# Patient Record
Sex: FEMALE | Race: WHITE | Employment: FULL TIME | URBAN - METROPOLITAN AREA
[De-identification: names, ages, dates, MRNs, and addresses within clinical notes are randomized per-mention and may not be internally consistent; named-entity substitution may affect disease eponyms.]

---

## 2019-10-25 ENCOUNTER — HOSPITAL ENCOUNTER (EMERGENCY)
Age: 19
Discharge: HOME | End: 2019-10-25
Attending: EMERGENCY MEDICINE
Payer: COMMERCIAL

## 2019-10-25 VITALS
OXYGEN SATURATION: 100 % | WEIGHT: 219 LBS | TEMPERATURE: 97.3 F | HEART RATE: 91 BPM | RESPIRATION RATE: 12 BRPM | DIASTOLIC BLOOD PRESSURE: 91 MMHG | SYSTOLIC BLOOD PRESSURE: 126 MMHG

## 2019-10-25 DIAGNOSIS — R31.9 URINARY TRACT INFECTION WITH HEMATURIA, SITE UNSPECIFIED: Primary | ICD-10-CM

## 2019-10-25 DIAGNOSIS — N39.0 URINARY TRACT INFECTION WITH HEMATURIA, SITE UNSPECIFIED: Primary | ICD-10-CM

## 2019-10-25 LAB
BACTERIA: ABNORMAL /HPF
BILIRUBIN URINE: NEGATIVE
BLOOD, URINE: ABNORMAL
CLARITY: ABNORMAL
COLOR: YELLOW
GLUCOSE URINE: NEGATIVE MG/DL
HCG(URINE) PREGNANCY TEST: NEGATIVE
KETONES, URINE: NEGATIVE MG/DL
LEUKOCYTE ESTERASE, URINE: ABNORMAL
NITRITE, URINE: NEGATIVE
PH UA: 6 (ref 5–9)
PROTEIN UA: 100 MG/DL
RBC UA: ABNORMAL /HPF (ref 0–2)
SPECIFIC GRAVITY UA: 1.01 (ref 1–1.03)
UROBILINOGEN, URINE: 0.2 E.U./DL
WBC UA: ABNORMAL /HPF (ref 0–5)

## 2019-10-25 PROCEDURE — G0382 LEV 3 HOSP TYPE B ED VISIT: HCPCS

## 2019-10-25 PROCEDURE — 81001 URINALYSIS AUTO W/SCOPE: CPT

## 2019-10-25 PROCEDURE — 81025 URINE PREGNANCY TEST: CPT

## 2019-10-25 RX ORDER — NITROFURANTOIN 25; 75 MG/1; MG/1
100 CAPSULE ORAL 2 TIMES DAILY
Qty: 20 CAPSULE | Refills: 0 | Status: SHIPPED | OUTPATIENT
Start: 2019-10-25 | End: 2019-11-04

## 2019-10-25 ASSESSMENT — PAIN DESCRIPTION - PROGRESSION
CLINICAL_PROGRESSION: NOT CHANGED
CLINICAL_PROGRESSION: NOT CHANGED

## 2019-10-25 ASSESSMENT — PAIN SCALES - GENERAL: PAINLEVEL_OUTOF10: 7

## 2019-10-25 ASSESSMENT — PAIN DESCRIPTION - PAIN TYPE: TYPE: ACUTE PAIN

## 2019-10-25 ASSESSMENT — ENCOUNTER SYMPTOMS
GASTROINTESTINAL NEGATIVE: 1
EYES NEGATIVE: 1
ALLERGIC/IMMUNOLOGIC NEGATIVE: 1
RESPIRATORY NEGATIVE: 1

## 2019-10-25 ASSESSMENT — PAIN DESCRIPTION - ORIENTATION: ORIENTATION: LOWER

## 2019-10-25 ASSESSMENT — PAIN DESCRIPTION - LOCATION: LOCATION: ABDOMEN

## 2019-10-25 ASSESSMENT — PAIN DESCRIPTION - DESCRIPTORS: DESCRIPTORS: SORE;ACHING

## 2019-10-25 ASSESSMENT — PAIN DESCRIPTION - DIRECTION: RADIATING_TOWARDS: BACK

## 2019-10-25 ASSESSMENT — PAIN - FUNCTIONAL ASSESSMENT: PAIN_FUNCTIONAL_ASSESSMENT: PREVENTS OR INTERFERES SOME ACTIVE ACTIVITIES AND ADLS

## 2019-10-25 ASSESSMENT — PAIN DESCRIPTION - FREQUENCY: FREQUENCY: CONTINUOUS

## 2019-11-15 ENCOUNTER — HOSPITAL ENCOUNTER (EMERGENCY)
Age: 19
Discharge: HOME | End: 2019-11-15
Attending: EMERGENCY MEDICINE
Payer: COMMERCIAL

## 2019-11-15 VITALS
TEMPERATURE: 97.8 F | DIASTOLIC BLOOD PRESSURE: 78 MMHG | RESPIRATION RATE: 12 BRPM | HEART RATE: 84 BPM | WEIGHT: 215 LBS | OXYGEN SATURATION: 99 % | SYSTOLIC BLOOD PRESSURE: 112 MMHG

## 2019-11-15 DIAGNOSIS — S61.011A LACERATION OF RIGHT THUMB WITHOUT FOREIGN BODY WITHOUT DAMAGE TO NAIL, INITIAL ENCOUNTER: Primary | ICD-10-CM

## 2019-11-15 PROCEDURE — 12001 RPR S/N/AX/GEN/TRNK 2.5CM/<: CPT

## 2019-11-15 PROCEDURE — G0381 LEV 2 HOSP TYPE B ED VISIT: HCPCS

## 2019-11-15 ASSESSMENT — PAIN DESCRIPTION - ORIENTATION: ORIENTATION: RIGHT

## 2019-11-15 ASSESSMENT — PAIN DESCRIPTION - PROGRESSION
CLINICAL_PROGRESSION: NOT CHANGED
CLINICAL_PROGRESSION: RAPIDLY IMPROVING

## 2019-11-15 ASSESSMENT — ENCOUNTER SYMPTOMS
EYES NEGATIVE: 1
ALLERGIC/IMMUNOLOGIC NEGATIVE: 1
GASTROINTESTINAL NEGATIVE: 1
RESPIRATORY NEGATIVE: 1

## 2019-11-15 ASSESSMENT — PAIN DESCRIPTION - LOCATION: LOCATION: FINGER (COMMENT WHICH ONE)

## 2019-11-15 ASSESSMENT — PAIN SCALES - GENERAL: PAINLEVEL_OUTOF10: 6

## 2019-11-15 ASSESSMENT — PAIN DESCRIPTION - PAIN TYPE: TYPE: ACUTE PAIN

## 2019-11-15 ASSESSMENT — PAIN - FUNCTIONAL ASSESSMENT: PAIN_FUNCTIONAL_ASSESSMENT: PREVENTS OR INTERFERES SOME ACTIVE ACTIVITIES AND ADLS

## 2019-11-15 ASSESSMENT — PAIN DESCRIPTION - FREQUENCY: FREQUENCY: INTERMITTENT

## 2019-11-15 ASSESSMENT — PAIN DESCRIPTION - DESCRIPTORS: DESCRIPTORS: SORE

## 2020-02-17 ENCOUNTER — HOSPITAL ENCOUNTER (EMERGENCY)
Age: 20
Discharge: HOME | End: 2020-02-17
Attending: EMERGENCY MEDICINE
Payer: COMMERCIAL

## 2020-02-17 VITALS
HEART RATE: 106 BPM | OXYGEN SATURATION: 98 % | SYSTOLIC BLOOD PRESSURE: 119 MMHG | WEIGHT: 213 LBS | RESPIRATION RATE: 18 BRPM | DIASTOLIC BLOOD PRESSURE: 78 MMHG | TEMPERATURE: 98.5 F

## 2020-02-17 LAB — HCG(URINE) PREGNANCY TEST: NEGATIVE

## 2020-02-17 PROCEDURE — G0382 LEV 3 HOSP TYPE B ED VISIT: HCPCS

## 2020-02-17 PROCEDURE — 81025 URINE PREGNANCY TEST: CPT

## 2020-02-17 RX ORDER — BROMPHENIRAMINE MALEATE, PSEUDOEPHEDRINE HYDROCHLORIDE, AND DEXTROMETHORPHAN HYDROBROMIDE 2; 30; 10 MG/5ML; MG/5ML; MG/5ML
5 SYRUP ORAL 4 TIMES DAILY PRN
Qty: 120 ML | Refills: 0 | Status: SHIPPED | OUTPATIENT
Start: 2020-02-17 | End: 2020-11-01

## 2020-02-17 RX ORDER — ONDANSETRON 4 MG/1
4 TABLET, ORALLY DISINTEGRATING ORAL EVERY 8 HOURS PRN
Qty: 10 TABLET | Refills: 0 | Status: SHIPPED | OUTPATIENT
Start: 2020-02-17 | End: 2020-11-01

## 2020-02-17 ASSESSMENT — PAIN DESCRIPTION - LOCATION: LOCATION: GENERALIZED

## 2020-02-17 ASSESSMENT — ENCOUNTER SYMPTOMS
COUGH: 0
DIARRHEA: 0
SORE THROAT: 0
WHEEZING: 0
BACK PAIN: 0
SINUS PRESSURE: 0
NAUSEA: 1
EYE DISCHARGE: 0
SHORTNESS OF BREATH: 0
ABDOMINAL DISTENTION: 0
EYE REDNESS: 0
EYE PAIN: 0
RHINORRHEA: 1
VOMITING: 0

## 2020-02-17 ASSESSMENT — PAIN SCALES - GENERAL
PAINLEVEL_OUTOF10: 6
PAINLEVEL_OUTOF10: 6

## 2020-02-17 ASSESSMENT — PAIN DESCRIPTION - PAIN TYPE: TYPE: ACUTE PAIN

## 2020-02-17 ASSESSMENT — PAIN - FUNCTIONAL ASSESSMENT: PAIN_FUNCTIONAL_ASSESSMENT: 0-10

## 2020-02-17 ASSESSMENT — PAIN DESCRIPTION - DESCRIPTORS: DESCRIPTORS: TINGLING

## 2020-02-17 ASSESSMENT — ACTIVITIES OF DAILY LIVING (ADL): EFFECT OF PAIN ON DAILY ACTIVITIES: WORSE WITH STANDING

## 2020-02-17 NOTE — LETTER
[address] [place] [address] Emergency Department  [address] [address]  Phone: [phone]               February 17, 2020    Patient: Allison Strickland   YOB: 2000   Date of Visit: 2/17/2020       To Whom It May Concern:    Librado Silva was seen and treated in our emergency department on 2/17/2020. She may return to work on 2/19/2020.       Sincerely,       Josephina Claude, RN         Signature:__________________________________

## 2020-11-01 ENCOUNTER — HOSPITAL ENCOUNTER (EMERGENCY)
Age: 20
Discharge: HOME | End: 2020-11-01
Payer: COMMERCIAL

## 2020-11-01 VITALS
WEIGHT: 211 LBS | HEART RATE: 115 BPM | OXYGEN SATURATION: 97 % | DIASTOLIC BLOOD PRESSURE: 73 MMHG | TEMPERATURE: 98.4 F | RESPIRATION RATE: 16 BRPM | SYSTOLIC BLOOD PRESSURE: 133 MMHG

## 2020-11-01 PROCEDURE — 99212 OFFICE O/P EST SF 10 MIN: CPT

## 2020-11-01 RX ORDER — ONDANSETRON 4 MG/1
4 TABLET, ORALLY DISINTEGRATING ORAL EVERY 12 HOURS PRN
Qty: 6 TABLET | Refills: 0 | Status: SHIPPED | OUTPATIENT
Start: 2020-11-01 | End: 2022-07-24

## 2020-11-01 NOTE — ED PROVIDER NOTES
Department of Emergency Medicine   ED  Provider Note  Admit Date/RoomTime: 2020 12:37 PM  ED Room:   Chief Complaint   Emesis (states she is 5.5 weeks pregnant and having morning sickness, but was throwing up more last night than usual and called off work) and Letter for School/Work    History of Present Illness   Source of history provided by:  patient. History/Exam Limitations: none. Lawrence Leigh is a 23 y.o. old female who has a past medical history of:   Past Medical History:   Diagnosis Date    Depression     presents to the emergency department requesting a work excuse. Patient is  1, para 0.  5-1/2 weeks gestation, following with Dr. Bebe Fatima. Patient states she has had some mild morning sickness off and on throughout the pregnancy. However last night she states after eating she got very sick. States she had multiple episodes of vomiting and diarrhea. States she had to call off work. Today she just feels drained and tired. Also admits to a headache. Vomiting and diarrhea has resolved today. ROS    Pertinent positives and negatives are stated within HPI, all other systems reviewed and are negative. History reviewed. No pertinent surgical history. Social History:  reports that she has been smoking. She has never used smokeless tobacco.  Family History: family history is not on file. Allergies: Pineapple    Physical Exam            ED Triage Vitals [20 1230]   BP Temp Temp src Pulse Resp SpO2 Height Weight   133/73 98.4 °F (36.9 °C) -- 115 16 97 % -- 211 lb (95.7 kg)      Oxygen Saturation Interpretation: Normal.    General:  NAD. Alert and Oriented. Well-appearing. Skin:  Warm, dry. No rashes. Head:  Normocephalic. Atraumatic. Eyes:  EOMI. Conjunctiva normal.  ENT:  Oral mucosa moist.  Airway patent. Neck:  Supple. Normal ROM. Respiratory:  No respiratory distress. No labored breathing.   Lungs clear without rales, rhonchi or wheezing. Cardiovascular:  Regular rate. No Murmur. No peripheral edema. Extremities warm and good color. Chest:  Abdomen:  Soft, nondistended. Normal bowel sounds. Nontender to palpation all 4 quadrants. Negative rebound, negative guarding. Rectal:  Gu: Bladder nontender and non distended. No CVA tenderness. Pelvic:  Extremities:  Normal ROM. Nontender to palpation. Atraumatic. Back:  Normal ROM. Nontender to palpation. Neuro:  Alert and Oriented to person, place, time and situation. Normal LOC. Moves all extremities. Speech fluent. Psych:  Calm and Cooperative. Normal thought process. Normal judgement. Lab / Imaging Results   (All laboratory and radiology results have been personally reviewed by myself)  Labs:  No results found for this visit on 11/01/20. Imaging: All Radiology results interpreted by Radiologist unless otherwise noted. No orders to display     ED Course / Medical Decision Making   Medications - No data to display     Re-examination:  11/1/20       Time:        Consult(s):   none. Procedure(s):   none    MDM:   Patient advised to not eat anything heavy. Just to go with soups and light food like mashed potatoes. Advised her that she needs to give her stomach at rest.  Increase her fluids. Counseling: The emergency provider has spoken with the patient and discussed todays results, in addition to providing specific details for the plan of care and counseling regarding the diagnosis and prognosis. Questions are answered at this time and they are agreeable with the plan. Assessment     1. Gastroenteritis      Plan   Discharge to home  Patient condition is good    New Medications     New Prescriptions    ONDANSETRON (ZOFRAN ODT) 4 MG DISINTEGRATING TABLET    Take 1 tablet by mouth every 12 hours as needed for Nausea or Vomiting     Electronically signed by ROSEANN Jackson   DD: 11/1/20  **This report was transcribed using voice recognition software. Every effort was made to ensure accuracy; however, inadvertent computerized transcription errors may be present.   END OF ED PROVIDER NOTE       Pacheco Limon, [address]  11/01/20 5905

## 2020-12-05 ENCOUNTER — HOSPITAL ENCOUNTER (EMERGENCY)
Age: 20
Discharge: HOME | End: 2020-12-05
Attending: EMERGENCY MEDICINE
Payer: COMMERCIAL

## 2020-12-05 ENCOUNTER — HOSPITAL ENCOUNTER (OUTPATIENT)
Age: 20
Setting detail: OBSERVATION
Discharge: HOME | End: 2020-12-05
Attending: OBSTETRICS & GYNECOLOGY | Admitting: OBSTETRICS & GYNECOLOGY
Payer: COMMERCIAL

## 2020-12-05 VITALS
DIASTOLIC BLOOD PRESSURE: 72 MMHG | HEART RATE: 110 BPM | TEMPERATURE: 99.1 F | OXYGEN SATURATION: 97 % | SYSTOLIC BLOOD PRESSURE: 119 MMHG | RESPIRATION RATE: 20 BRPM

## 2020-12-05 VITALS
TEMPERATURE: 100.4 F | RESPIRATION RATE: 22 BRPM | SYSTOLIC BLOOD PRESSURE: 134 MMHG | HEART RATE: 113 BPM | DIASTOLIC BLOOD PRESSURE: 79 MMHG

## 2020-12-05 PROBLEM — O26.92 COMPLICATED PREGNANCY, SECOND TRIMESTER: Status: ACTIVE | Noted: 2020-12-05

## 2020-12-05 LAB
ALBUMIN SERPL-MCNC: 3.9 G/DL (ref 3.5–5.2)
ALP BLD-CCNC: 89 U/L (ref 35–104)
ALT SERPL-CCNC: 24 U/L (ref 0–32)
ANION GAP SERPL CALCULATED.3IONS-SCNC: 15 MMOL/L (ref 7–16)
AST SERPL-CCNC: 18 U/L (ref 0–31)
BACTERIA: ABNORMAL /HPF
BASOPHILS ABSOLUTE: 0.04 E9/L (ref 0–0.2)
BASOPHILS RELATIVE PERCENT: 0.3 % (ref 0–2)
BILIRUB SERPL-MCNC: 0.3 MG/DL (ref 0–1.2)
BILIRUBIN URINE: NEGATIVE
BLOOD, URINE: ABNORMAL
BUN BLDV-MCNC: 3 MG/DL (ref 6–20)
CALCIUM SERPL-MCNC: 9 MG/DL (ref 8.6–10.2)
CHLORIDE BLD-SCNC: 103 MMOL/L (ref 98–107)
CLARITY: ABNORMAL
CO2: 18 MMOL/L (ref 22–29)
COLOR: YELLOW
CREAT SERPL-MCNC: 0.5 MG/DL (ref 0.5–1)
EOSINOPHILS ABSOLUTE: 0.11 E9/L (ref 0.05–0.5)
EOSINOPHILS RELATIVE PERCENT: 0.7 % (ref 0–6)
EPITHELIAL CELLS, UA: ABNORMAL /HPF
GFR AFRICAN AMERICAN: >60
GFR NON-AFRICAN AMERICAN: >60 ML/MIN/1.73
GLUCOSE BLD-MCNC: 98 MG/DL (ref 74–99)
GLUCOSE URINE: NEGATIVE MG/DL
HCT VFR BLD CALC: 30.4 % (ref 34–48)
HEMOGLOBIN: 10.3 G/DL (ref 11.5–15.5)
IMMATURE GRANULOCYTES #: 0.1 E9/L
IMMATURE GRANULOCYTES %: 0.7 % (ref 0–5)
KETONES, URINE: ABNORMAL MG/DL
LEUKOCYTE ESTERASE, URINE: ABNORMAL
LYMPHOCYTES ABSOLUTE: 1.98 E9/L (ref 1.5–4)
LYMPHOCYTES RELATIVE PERCENT: 13.4 % (ref 20–42)
MCH RBC QN AUTO: 30 PG (ref 26–35)
MCHC RBC AUTO-ENTMCNC: 33.9 % (ref 32–34.5)
MCV RBC AUTO: 88.6 FL (ref 80–99.9)
MONOCYTES ABSOLUTE: 0.87 E9/L (ref 0.1–0.95)
MONOCYTES RELATIVE PERCENT: 5.9 % (ref 2–12)
NEUTROPHILS ABSOLUTE: 11.7 E9/L (ref 1.8–7.3)
NEUTROPHILS RELATIVE PERCENT: 79 % (ref 43–80)
NITRITE, URINE: NEGATIVE
PDW BLD-RTO: 13.2 FL (ref 11.5–15)
PH UA: 6 (ref 5–9)
PLATELET # BLD: 306 E9/L (ref 130–450)
PMV BLD AUTO: 10.1 FL (ref 7–12)
POTASSIUM SERPL-SCNC: 3.7 MMOL/L (ref 3.5–5)
PROTEIN UA: NEGATIVE MG/DL
RBC # BLD: 3.43 E12/L (ref 3.5–5.5)
RBC UA: ABNORMAL /HPF (ref 0–2)
SARS-COV-2, NAAT: NOT DETECTED
SODIUM BLD-SCNC: 136 MMOL/L (ref 132–146)
SPECIFIC GRAVITY UA: <=1.005 (ref 1–1.03)
TOTAL PROTEIN: 7.2 G/DL (ref 6.4–8.3)
UROBILINOGEN, URINE: 0.2 E.U./DL
WBC # BLD: 14.8 E9/L (ref 4.5–11.5)
WBC UA: ABNORMAL /HPF (ref 0–5)

## 2020-12-05 PROCEDURE — 81001 URINALYSIS AUTO W/SCOPE: CPT

## 2020-12-05 PROCEDURE — 99283 EMERGENCY DEPT VISIT LOW MDM: CPT

## 2020-12-05 PROCEDURE — 80053 COMPREHEN METABOLIC PANEL: CPT

## 2020-12-05 PROCEDURE — 85025 COMPLETE CBC W/AUTO DIFF WBC: CPT

## 2020-12-05 PROCEDURE — U0002 COVID-19 LAB TEST NON-CDC: HCPCS

## 2020-12-05 PROCEDURE — 36415 COLL VENOUS BLD VENIPUNCTURE: CPT

## 2020-12-05 PROCEDURE — G0378 HOSPITAL OBSERVATION PER HR: HCPCS

## 2020-12-05 RX ORDER — CEPHALEXIN 500 MG/1
500 CAPSULE ORAL 2 TIMES DAILY
Qty: 14 CAPSULE | Refills: 0 | Status: SHIPPED | OUTPATIENT
Start: 2020-12-05 | End: 2020-12-12

## 2020-12-05 RX ORDER — SODIUM CHLORIDE, SODIUM LACTATE, POTASSIUM CHLORIDE, AND CALCIUM CHLORIDE .6; .31; .03; .02 G/100ML; G/100ML; G/100ML; G/100ML
500 INJECTION, SOLUTION INTRAVENOUS ONCE
Status: DISCONTINUED | OUTPATIENT
Start: 2020-12-05 | End: 2020-12-05 | Stop reason: HOSPADM

## 2020-12-05 RX ORDER — ALBUTEROL SULFATE 90 UG/1
2 AEROSOL, METERED RESPIRATORY (INHALATION) EVERY 6 HOURS PRN
Qty: 1 INHALER | Refills: 0 | Status: SHIPPED | OUTPATIENT
Start: 2020-12-05 | End: 2022-01-25

## 2020-12-05 RX ORDER — SODIUM CHLORIDE, SODIUM LACTATE, POTASSIUM CHLORIDE, CALCIUM CHLORIDE 600; 310; 30; 20 MG/100ML; MG/100ML; MG/100ML; MG/100ML
INJECTION, SOLUTION INTRAVENOUS CONTINUOUS
Status: DISCONTINUED | OUTPATIENT
Start: 2020-12-05 | End: 2020-12-05 | Stop reason: HOSPADM

## 2020-12-05 ASSESSMENT — PAIN DESCRIPTION - LOCATION: LOCATION: ABDOMEN;CHEST

## 2020-12-05 ASSESSMENT — ENCOUNTER SYMPTOMS
SINUS PRESSURE: 0
NAUSEA: 0
BACK PAIN: 0
COUGH: 1
ABDOMINAL DISTENTION: 0
VOMITING: 0
SORE THROAT: 0
SHORTNESS OF BREATH: 0
DIARRHEA: 0
EYE DISCHARGE: 0
WHEEZING: 0
EYE PAIN: 0
EYE REDNESS: 0

## 2020-12-05 ASSESSMENT — PAIN DESCRIPTION - FREQUENCY: FREQUENCY: CONTINUOUS

## 2020-12-05 ASSESSMENT — PAIN SCALES - GENERAL: PAINLEVEL_OUTOF10: 5

## 2020-12-05 ASSESSMENT — PAIN DESCRIPTION - DESCRIPTORS: DESCRIPTORS: DISCOMFORT;BURNING

## 2020-12-05 NOTE — ED PROVIDER NOTES
Pupils: Pupils are equal, round, and reactive to light. Neck:      Musculoskeletal: Normal range of motion and neck supple. Cardiovascular:      Rate and Rhythm: Normal rate and regular rhythm. Heart sounds: Normal heart sounds. No murmur. Pulmonary:      Effort: Pulmonary effort is normal. No respiratory distress. Breath sounds: Wheezing present. No rales. Comments: Rare, scattered expiratory wheeze  Abdominal:      General: Bowel sounds are normal.      Palpations: Abdomen is soft. Tenderness: There is no abdominal tenderness. There is no guarding or rebound. Skin:     General: Skin is warm and dry. Neurological:      Mental Status: She is alert and oriented to person, place, and time. Cranial Nerves: No cranial nerve deficit. Coordination: Coordination normal.          Procedures     MDM   I did review her chart recent results. Covid is negative. However, her urinalysis does show many bacteria and trace leukocyte esterace with rare epithelial cells. In the setting of pregnancy, probably best to treat this. Prescription will be given for antibiotic and inhaler. Discussed the use of Tylenol at home. She is to follow-up with the on-call primary care physician as well as her OB/GYN.           --------------------------------------------- PAST HISTORY ---------------------------------------------  Past Medical History:  has a past medical history of Depression. Past Surgical History:  has no past surgical history on file. Social History:  reports that she quit smoking about 2 months ago. Her smoking use included cigarettes. She has never used smokeless tobacco. She reports previous alcohol use. She reports previous drug use. Drug: Marijuana. Family History: family history is not on file. The patients home medications have been reviewed.     Allergies: Pineapple    -------------------------------------------------- RESULTS -------------------------------------------------  Labs:  No results found for this visit on 12/05/20. Radiology:  No orders to display       ------------------------- NURSING NOTES AND VITALS REVIEWED ---------------------------  Date / Time Roomed:  12/5/2020  7:27 AM  ED Bed Assignment:  17/17    The nursing notes within the ED encounter and vital signs as below have been reviewed. /72   Pulse 110   Temp 99.1 °F (37.3 °C) (Temporal)   Resp 20   LMP 01/10/2020   SpO2 97%   Oxygen Saturation Interpretation: Normal      ------------------------------------------ PROGRESS NOTES ------------------------------------------  7:47 AM EST  I have spoken with the patient and discussed todays results, in addition to providing specific details for the plan of care and counseling regarding the diagnosis and prognosis. Their questions are answered at this time and they are agreeable with the plan. I discussed at length with them reasons for immediate return here for re evaluation. They will followup with their OB/GYN by calling their office on Monday. She will also follow-up on Monday with the on-call primary care. We discussed reasons for immediate return including shortness of breath.      --------------------------------- ADDITIONAL PROVIDER NOTES ---------------------------------  At this time the patient is without objective evidence of an acute process requiring hospitalization or inpatient management. They have remained hemodynamically stable throughout their entire ED visit and are stable for discharge with outpatient follow-up. The plan has been discussed in detail and they are aware of the specific conditions for emergent return, as well as the importance of follow-up.       New Prescriptions    ALBUTEROL SULFATE HFA (PROAIR HFA) 108 (90 BASE) MCG/ACT INHALER    Inhale 2 puffs into the lungs every 6 hours as needed (Cough, wheezing, Shortness of breath)    CEPHALEXIN (KEFLEX) 500 MG CAPSULE Take 1 capsule by mouth 2 times daily for 7 days       Diagnosis:  1. Acute upper respiratory infection    2. Acute cystitis without hematuria        Disposition:  Patient's disposition: Discharge to home  Patient's condition is stable.               Harry Luis [place]  12/05/20 9966

## 2020-12-05 NOTE — PROGRESS NOTES
Dr. Emanuel Means notified of patient's arrival, complaint, FHR tracing, uterine activity, and vital signs. Orders received.

## 2020-12-05 NOTE — ED NOTES
Bed: 17  Expected date:   Expected time:   Means of arrival:   Comments:  TERMINAL     Bronson Parents, RN  12/05/20 1414

## 2020-12-05 NOTE — PROGRESS NOTES
Patient  25w5d to L&D complaining of cough, shortness of breath, abdominal pain with coughing, and nasal congestion. Patient perceives fetal movement and fetal well being established via EFM.

## 2021-01-06 ENCOUNTER — APPOINTMENT (OUTPATIENT)
Dept: LABOR AND DELIVERY | Age: 21
End: 2021-01-06
Payer: COMMERCIAL

## 2021-01-06 ENCOUNTER — HOSPITAL ENCOUNTER (OUTPATIENT)
Age: 21
Discharge: HOME | End: 2021-01-06
Attending: OBSTETRICS & GYNECOLOGY | Admitting: OBSTETRICS & GYNECOLOGY
Payer: COMMERCIAL

## 2021-01-06 VITALS
SYSTOLIC BLOOD PRESSURE: 133 MMHG | HEART RATE: 90 BPM | TEMPERATURE: 98.2 F | DIASTOLIC BLOOD PRESSURE: 67 MMHG | RESPIRATION RATE: 18 BRPM

## 2021-01-06 LAB
BASOPHILS ABSOLUTE: 0.06 E9/L (ref 0–0.2)
BASOPHILS RELATIVE PERCENT: 0.5 % (ref 0–2)
EOSINOPHILS ABSOLUTE: 0.11 E9/L (ref 0.05–0.5)
EOSINOPHILS RELATIVE PERCENT: 0.9 % (ref 0–6)
GLUCOSE TOLERANCE SCREEN 50G: 174 MG/DL (ref 70–140)
HCT VFR BLD CALC: 32.6 % (ref 34–48)
HEMOGLOBIN: 11 G/DL (ref 11.5–15.5)
IMMATURE GRANULOCYTES #: 0.22 E9/L
IMMATURE GRANULOCYTES %: 1.7 % (ref 0–5)
LYMPHOCYTES ABSOLUTE: 2.09 E9/L (ref 1.5–4)
LYMPHOCYTES RELATIVE PERCENT: 16.4 % (ref 20–42)
MCH RBC QN AUTO: 30 PG (ref 26–35)
MCHC RBC AUTO-ENTMCNC: 33.7 % (ref 32–34.5)
MCV RBC AUTO: 88.8 FL (ref 80–99.9)
MONOCYTES ABSOLUTE: 0.54 E9/L (ref 0.1–0.95)
MONOCYTES RELATIVE PERCENT: 4.2 % (ref 2–12)
NEUTROPHILS ABSOLUTE: 9.76 E9/L (ref 1.8–7.3)
NEUTROPHILS RELATIVE PERCENT: 76.3 % (ref 43–80)
PDW BLD-RTO: 13.3 FL (ref 11.5–15)
PLATELET # BLD: 263 E9/L (ref 130–450)
PMV BLD AUTO: 9.8 FL (ref 7–12)
RBC # BLD: 3.67 E12/L (ref 3.5–5.5)
WBC # BLD: 12.8 E9/L (ref 4.5–11.5)

## 2021-01-06 PROCEDURE — 59025 FETAL NON-STRESS TEST: CPT

## 2021-01-06 PROCEDURE — 85025 COMPLETE CBC W/AUTO DIFF WBC: CPT

## 2021-01-06 PROCEDURE — 36415 COLL VENOUS BLD VENIPUNCTURE: CPT

## 2021-01-06 PROCEDURE — 82950 GLUCOSE TEST: CPT

## 2021-01-06 NOTE — PROGRESS NOTES
Reactive NST observed and sent to  with call from 's office to discharge home. Patient instructed to return to L&D as scheduled for next NST and to return for S&S labor,increased pain or decreased fetal activity-patient verbalized understanding. EFM removed and discharged to home ambulatory from L&D undelivered in stable condition.

## 2021-01-09 ENCOUNTER — APPOINTMENT (OUTPATIENT)
Dept: LABOR AND DELIVERY | Age: 21
End: 2021-01-09
Payer: COMMERCIAL

## 2021-01-09 ENCOUNTER — HOSPITAL ENCOUNTER (OUTPATIENT)
Age: 21
Discharge: HOME | End: 2021-01-09
Attending: OBSTETRICS & GYNECOLOGY | Admitting: OBSTETRICS & GYNECOLOGY
Payer: COMMERCIAL

## 2021-01-09 VITALS
RESPIRATION RATE: 14 BRPM | TEMPERATURE: 97.7 F | SYSTOLIC BLOOD PRESSURE: 119 MMHG | DIASTOLIC BLOOD PRESSURE: 65 MMHG | HEART RATE: 87 BPM

## 2021-01-09 PROCEDURE — 59025 FETAL NON-STRESS TEST: CPT

## 2021-01-10 PROBLEM — Q27.0 TWO VESSEL UMBILICAL CORD: Status: ACTIVE | Noted: 2021-01-10

## 2021-01-10 NOTE — PROGRESS NOTES
30w5d  EDC 3/15/21 ambulatory to L&D for scheduled NST due to 2 vessel cord. EFM applied. Maternal perception of fetal mvement noted. Patient denies any bleeding, leaking of fluid, or contractions. Call light in reach.
Diagnosis:   Two-vessel cord    Result:  Reactive       Plan:  Discharge home    F/U as scheduled      Nayely Cottrell
Dr. Mell Cool on unit reviewing NST tracing. Patient may be discharged.
NST tracing faxed to Dr. Sapp Amie e-mail.
Patient discharged in stable condition.
Regular rate and rhythm, Heart sounds S1 S2 present, no murmurs, rubs or gallops

## 2021-01-16 ENCOUNTER — HOSPITAL ENCOUNTER (OUTPATIENT)
Age: 21
Discharge: HOME | End: 2021-01-16
Attending: OBSTETRICS & GYNECOLOGY | Admitting: OBSTETRICS & GYNECOLOGY
Payer: COMMERCIAL

## 2021-01-16 ENCOUNTER — APPOINTMENT (OUTPATIENT)
Dept: LABOR AND DELIVERY | Age: 21
End: 2021-01-16
Payer: COMMERCIAL

## 2021-01-16 VITALS
DIASTOLIC BLOOD PRESSURE: 72 MMHG | RESPIRATION RATE: 18 BRPM | TEMPERATURE: 98 F | SYSTOLIC BLOOD PRESSURE: 130 MMHG | HEART RATE: 93 BPM

## 2021-01-16 PROCEDURE — 59025 FETAL NON-STRESS TEST: CPT

## 2021-01-16 NOTE — PROGRESS NOTES
Diagnosis:   Two-vessel cord  Result:  Reactive       Plan:  Discharge home    F/U as scheduled      Nayely Cottrell

## 2021-01-16 NOTE — PROGRESS NOTES
G 1 P 0   31 week 5d  Pt of    Arrived ambulatory to unit for scheduled NST due to 2 vessel cord  Pt denies any bleeding or leaking of fluid, denies contractions. Pt stated that she perceives fetal movement and has not had any other complications with pregnancy. Test discussed with pt who verbalized understanding.

## 2021-01-20 ENCOUNTER — APPOINTMENT (OUTPATIENT)
Dept: LABOR AND DELIVERY | Age: 21
End: 2021-01-20
Payer: COMMERCIAL

## 2021-01-20 ENCOUNTER — HOSPITAL ENCOUNTER (OUTPATIENT)
Age: 21
Discharge: HOME | End: 2021-01-20
Attending: OBSTETRICS & GYNECOLOGY | Admitting: OBSTETRICS & GYNECOLOGY
Payer: COMMERCIAL

## 2021-01-20 VITALS
HEART RATE: 85 BPM | RESPIRATION RATE: 18 BRPM | DIASTOLIC BLOOD PRESSURE: 72 MMHG | TEMPERATURE: 98 F | SYSTOLIC BLOOD PRESSURE: 128 MMHG

## 2021-01-20 PROCEDURE — 59025 FETAL NON-STRESS TEST: CPT

## 2021-01-23 ENCOUNTER — APPOINTMENT (OUTPATIENT)
Dept: LABOR AND DELIVERY | Age: 21
End: 2021-01-23
Payer: COMMERCIAL

## 2021-01-23 ENCOUNTER — HOSPITAL ENCOUNTER (OUTPATIENT)
Age: 21
Discharge: HOME | End: 2021-01-23
Attending: OBSTETRICS & GYNECOLOGY | Admitting: OBSTETRICS & GYNECOLOGY
Payer: COMMERCIAL

## 2021-01-23 VITALS
TEMPERATURE: 97.8 F | RESPIRATION RATE: 16 BRPM | HEART RATE: 90 BPM | DIASTOLIC BLOOD PRESSURE: 73 MMHG | SYSTOLIC BLOOD PRESSURE: 125 MMHG

## 2021-01-23 PROCEDURE — 59025 FETAL NON-STRESS TEST: CPT

## 2021-01-27 ENCOUNTER — HOSPITAL ENCOUNTER (OUTPATIENT)
Age: 21
Discharge: HOME | End: 2021-01-27
Attending: OBSTETRICS & GYNECOLOGY | Admitting: OBSTETRICS & GYNECOLOGY
Payer: COMMERCIAL

## 2021-01-27 ENCOUNTER — APPOINTMENT (OUTPATIENT)
Dept: LABOR AND DELIVERY | Age: 21
End: 2021-01-27
Payer: COMMERCIAL

## 2021-01-27 VITALS — SYSTOLIC BLOOD PRESSURE: 132 MMHG | DIASTOLIC BLOOD PRESSURE: 66 MMHG | HEART RATE: 77 BPM | RESPIRATION RATE: 16 BRPM

## 2021-01-27 PROCEDURE — 59025 FETAL NON-STRESS TEST: CPT

## 2021-01-30 ENCOUNTER — APPOINTMENT (OUTPATIENT)
Dept: LABOR AND DELIVERY | Age: 21
End: 2021-01-30
Payer: COMMERCIAL

## 2021-01-30 ENCOUNTER — HOSPITAL ENCOUNTER (OUTPATIENT)
Age: 21
Discharge: HOME | End: 2021-01-30
Attending: OBSTETRICS & GYNECOLOGY | Admitting: OBSTETRICS & GYNECOLOGY
Payer: COMMERCIAL

## 2021-01-30 VITALS
SYSTOLIC BLOOD PRESSURE: 124 MMHG | RESPIRATION RATE: 16 BRPM | TEMPERATURE: 98 F | DIASTOLIC BLOOD PRESSURE: 72 MMHG | HEART RATE: 104 BPM

## 2021-01-30 PROCEDURE — 59025 FETAL NON-STRESS TEST: CPT

## 2021-01-30 NOTE — PROGRESS NOTES
33w5d  EDC 3/15/21 ambulatory to L&D for scheduled NST due to 2 vessel cord. EFM applied. Maternal perception of fetal movement noted. Denies bleeding, leaking of fluid, or contractions. Call light in reach.

## 2021-02-03 ENCOUNTER — APPOINTMENT (OUTPATIENT)
Dept: LABOR AND DELIVERY | Age: 21
End: 2021-02-03
Payer: COMMERCIAL

## 2021-02-03 ENCOUNTER — HOSPITAL ENCOUNTER (OUTPATIENT)
Age: 21
Discharge: HOME | End: 2021-02-03
Attending: OBSTETRICS & GYNECOLOGY | Admitting: OBSTETRICS & GYNECOLOGY
Payer: COMMERCIAL

## 2021-02-03 VITALS
HEART RATE: 85 BPM | DIASTOLIC BLOOD PRESSURE: 75 MMHG | SYSTOLIC BLOOD PRESSURE: 129 MMHG | RESPIRATION RATE: 16 BRPM | TEMPERATURE: 98.6 F

## 2021-02-03 PROCEDURE — 59025 FETAL NON-STRESS TEST: CPT

## 2021-02-06 ENCOUNTER — HOSPITAL ENCOUNTER (OUTPATIENT)
Age: 21
Discharge: HOME | End: 2021-02-06
Attending: OBSTETRICS & GYNECOLOGY | Admitting: OBSTETRICS & GYNECOLOGY
Payer: COMMERCIAL

## 2021-02-06 ENCOUNTER — APPOINTMENT (OUTPATIENT)
Dept: LABOR AND DELIVERY | Age: 21
End: 2021-02-06
Payer: COMMERCIAL

## 2021-02-06 VITALS — RESPIRATION RATE: 16 BRPM | SYSTOLIC BLOOD PRESSURE: 133 MMHG | DIASTOLIC BLOOD PRESSURE: 69 MMHG | HEART RATE: 91 BPM

## 2021-02-06 PROCEDURE — 59025 FETAL NON-STRESS TEST: CPT

## 2021-02-10 ENCOUNTER — HOSPITAL ENCOUNTER (OUTPATIENT)
Age: 21
Discharge: HOME | End: 2021-02-10
Attending: OBSTETRICS & GYNECOLOGY | Admitting: OBSTETRICS & GYNECOLOGY
Payer: COMMERCIAL

## 2021-02-10 ENCOUNTER — APPOINTMENT (OUTPATIENT)
Dept: LABOR AND DELIVERY | Age: 21
End: 2021-02-10
Payer: COMMERCIAL

## 2021-02-10 VITALS — DIASTOLIC BLOOD PRESSURE: 80 MMHG | SYSTOLIC BLOOD PRESSURE: 132 MMHG | RESPIRATION RATE: 16 BRPM | HEART RATE: 94 BPM

## 2021-02-10 PROCEDURE — 59025 FETAL NON-STRESS TEST: CPT

## 2021-02-13 ENCOUNTER — HOSPITAL ENCOUNTER (OUTPATIENT)
Age: 21
Discharge: HOME | End: 2021-02-13
Attending: OBSTETRICS & GYNECOLOGY | Admitting: OBSTETRICS & GYNECOLOGY
Payer: COMMERCIAL

## 2021-02-13 ENCOUNTER — APPOINTMENT (OUTPATIENT)
Dept: LABOR AND DELIVERY | Age: 21
End: 2021-02-13
Payer: COMMERCIAL

## 2021-02-13 VITALS
SYSTOLIC BLOOD PRESSURE: 141 MMHG | HEART RATE: 90 BPM | TEMPERATURE: 98 F | DIASTOLIC BLOOD PRESSURE: 77 MMHG | RESPIRATION RATE: 16 BRPM

## 2021-02-13 PROCEDURE — 59025 FETAL NON-STRESS TEST: CPT

## 2021-02-13 NOTE — PROGRESS NOTES
35w5d ISAIAH 3/15/21 ambulatory to the L&D unit for NST. EFM applied. Maternal perception of fetal movement noted. Pt denies any bleeding or leaking fluids. Pt denies any contractions or pain. Call light within reach.

## 2021-02-17 ENCOUNTER — HOSPITAL ENCOUNTER (OUTPATIENT)
Age: 21
Discharge: HOME | End: 2021-02-17
Attending: OBSTETRICS & GYNECOLOGY | Admitting: OBSTETRICS & GYNECOLOGY
Payer: COMMERCIAL

## 2021-02-17 VITALS
TEMPERATURE: 97.6 F | RESPIRATION RATE: 18 BRPM | HEART RATE: 90 BPM | DIASTOLIC BLOOD PRESSURE: 78 MMHG | SYSTOLIC BLOOD PRESSURE: 137 MMHG

## 2021-02-17 DIAGNOSIS — Z01.818 PREOP TESTING: Primary | ICD-10-CM

## 2021-02-17 PROCEDURE — 59025 FETAL NON-STRESS TEST: CPT

## 2021-02-17 NOTE — PROGRESS NOTES
Patient received into ld 5 for scheduled NST for 2 vessel cord.  with edc 3/15/20. Patient is scheduled for c section next week per was told baby is breech presentation. Patient states fetus active, denies leaking of fluid and contractions.

## 2021-02-17 NOTE — PROGRESS NOTES
Dr Briseno Cough returned call after reviewing strip. Patient ok for discharge to home with follow up as scheduled. Instructed on kick counts and when to return to hospital.  No questions.   Discharged ambulatory to home in stable condition

## 2021-02-20 ENCOUNTER — APPOINTMENT (OUTPATIENT)
Dept: LABOR AND DELIVERY | Age: 21
End: 2021-02-20
Payer: COMMERCIAL

## 2021-02-20 ENCOUNTER — HOSPITAL ENCOUNTER (OUTPATIENT)
Age: 21
Discharge: HOME | End: 2021-02-20
Attending: OBSTETRICS & GYNECOLOGY | Admitting: OBSTETRICS & GYNECOLOGY
Payer: COMMERCIAL

## 2021-02-20 VITALS — DIASTOLIC BLOOD PRESSURE: 67 MMHG | RESPIRATION RATE: 16 BRPM | HEART RATE: 95 BPM | SYSTOLIC BLOOD PRESSURE: 131 MMHG

## 2021-02-20 PROCEDURE — 59025 FETAL NON-STRESS TEST: CPT

## 2021-02-24 ENCOUNTER — ANESTHESIA EVENT (OUTPATIENT)
Dept: LABOR AND DELIVERY | Age: 21
DRG: 540 | End: 2021-02-24
Payer: COMMERCIAL

## 2021-02-25 ENCOUNTER — HOSPITAL ENCOUNTER (INPATIENT)
Age: 21
LOS: 3 days | Discharge: HOME | DRG: 540 | End: 2021-02-28
Attending: OBSTETRICS & GYNECOLOGY | Admitting: OBSTETRICS & GYNECOLOGY
Payer: COMMERCIAL

## 2021-02-25 ENCOUNTER — ANESTHESIA (OUTPATIENT)
Dept: LABOR AND DELIVERY | Age: 21
DRG: 540 | End: 2021-02-25
Payer: COMMERCIAL

## 2021-02-25 VITALS
DIASTOLIC BLOOD PRESSURE: 60 MMHG | OXYGEN SATURATION: 98 % | RESPIRATION RATE: 27 BRPM | SYSTOLIC BLOOD PRESSURE: 127 MMHG

## 2021-02-25 PROBLEM — Z3A.39 39 WEEKS GESTATION OF PREGNANCY: Status: ACTIVE | Noted: 2021-02-25

## 2021-02-25 PROBLEM — O26.92 COMPLICATED PREGNANCY, SECOND TRIMESTER: Status: RESOLVED | Noted: 2020-12-05 | Resolved: 2021-02-25

## 2021-02-25 LAB
ABO/RH: NORMAL
AMPHETAMINE SCREEN, URINE: NOT DETECTED
ANION GAP SERPL CALCULATED.3IONS-SCNC: 13 MMOL/L (ref 7–16)
ANTIBODY SCREEN: NORMAL
BARBITURATE SCREEN URINE: NOT DETECTED
BENZODIAZEPINE SCREEN, URINE: NOT DETECTED
BUN BLDV-MCNC: 7 MG/DL (ref 6–20)
CALCIUM SERPL-MCNC: 10.2 MG/DL (ref 8.6–10.2)
CANNABINOID SCREEN URINE: NOT DETECTED
CHLORIDE BLD-SCNC: 105 MMOL/L (ref 98–107)
CO2: 19 MMOL/L (ref 22–29)
COCAINE METABOLITE SCREEN URINE: NOT DETECTED
CREAT SERPL-MCNC: 0.5 MG/DL (ref 0.5–1)
FENTANYL SCREEN, URINE: NOT DETECTED
GFR AFRICAN AMERICAN: >60
GFR NON-AFRICAN AMERICAN: >60 ML/MIN/1.73
GLUCOSE BLD-MCNC: 86 MG/DL (ref 74–99)
HCT VFR BLD CALC: 32.8 % (ref 34–48)
HEMOGLOBIN: 10.8 G/DL (ref 11.5–15.5)
Lab: NORMAL
MCH RBC QN AUTO: 29.6 PG (ref 26–35)
MCHC RBC AUTO-ENTMCNC: 32.9 % (ref 32–34.5)
MCV RBC AUTO: 89.9 FL (ref 80–99.9)
METHADONE SCREEN, URINE: NOT DETECTED
OPIATE SCREEN URINE: NOT DETECTED
OXYCODONE URINE: NOT DETECTED
PDW BLD-RTO: 13.5 FL (ref 11.5–15)
PHENCYCLIDINE SCREEN URINE: NOT DETECTED
PLATELET # BLD: 292 E9/L (ref 130–450)
PMV BLD AUTO: 11.4 FL (ref 7–12)
POTASSIUM SERPL-SCNC: 4.4 MMOL/L (ref 3.5–5)
RBC # BLD: 3.65 E12/L (ref 3.5–5.5)
SODIUM BLD-SCNC: 137 MMOL/L (ref 132–146)
WBC # BLD: 14.4 E9/L (ref 4.5–11.5)

## 2021-02-25 PROCEDURE — 6360000002 HC RX W HCPCS: Performed by: ANESTHESIOLOGY

## 2021-02-25 PROCEDURE — 7100000001 HC PACU RECOVERY - ADDTL 15 MIN: Performed by: OBSTETRICS & GYNECOLOGY

## 2021-02-25 PROCEDURE — 2580000003 HC RX 258: Performed by: OBSTETRICS & GYNECOLOGY

## 2021-02-25 PROCEDURE — 2500000003 HC RX 250 WO HCPCS: Performed by: NURSE ANESTHETIST, CERTIFIED REGISTERED

## 2021-02-25 PROCEDURE — 80307 DRUG TEST PRSMV CHEM ANLYZR: CPT

## 2021-02-25 PROCEDURE — 6360000002 HC RX W HCPCS: Performed by: OBSTETRICS & GYNECOLOGY

## 2021-02-25 PROCEDURE — 3700000000 HC ANESTHESIA ATTENDED CARE: Performed by: OBSTETRICS & GYNECOLOGY

## 2021-02-25 PROCEDURE — 6370000000 HC RX 637 (ALT 250 FOR IP): Performed by: OBSTETRICS & GYNECOLOGY

## 2021-02-25 PROCEDURE — 88307 TISSUE EXAM BY PATHOLOGIST: CPT

## 2021-02-25 PROCEDURE — 2709999900 HC NON-CHARGEABLE SUPPLY: Performed by: OBSTETRICS & GYNECOLOGY

## 2021-02-25 PROCEDURE — 6370000000 HC RX 637 (ALT 250 FOR IP): Performed by: ANESTHESIOLOGY

## 2021-02-25 PROCEDURE — 86850 RBC ANTIBODY SCREEN: CPT

## 2021-02-25 PROCEDURE — 85027 COMPLETE CBC AUTOMATED: CPT

## 2021-02-25 PROCEDURE — 86900 BLOOD TYPING SEROLOGIC ABO: CPT

## 2021-02-25 PROCEDURE — 86901 BLOOD TYPING SEROLOGIC RH(D): CPT

## 2021-02-25 PROCEDURE — 1220000001 HC SEMI PRIVATE L&D R&B

## 2021-02-25 PROCEDURE — 3609079900 HC CESAREAN SECTION: Performed by: OBSTETRICS & GYNECOLOGY

## 2021-02-25 PROCEDURE — 6360000002 HC RX W HCPCS: Performed by: NURSE ANESTHETIST, CERTIFIED REGISTERED

## 2021-02-25 PROCEDURE — 3700000001 HC ADD 15 MINUTES (ANESTHESIA): Performed by: OBSTETRICS & GYNECOLOGY

## 2021-02-25 PROCEDURE — 7100000000 HC PACU RECOVERY - FIRST 15 MIN: Performed by: OBSTETRICS & GYNECOLOGY

## 2021-02-25 PROCEDURE — 36415 COLL VENOUS BLD VENIPUNCTURE: CPT

## 2021-02-25 PROCEDURE — 80048 BASIC METABOLIC PNL TOTAL CA: CPT

## 2021-02-25 PROCEDURE — 2500000003 HC RX 250 WO HCPCS: Performed by: ANESTHESIOLOGY

## 2021-02-25 RX ORDER — SODIUM CHLORIDE 0.9 % (FLUSH) 0.9 %
10 SYRINGE (ML) INJECTION EVERY 12 HOURS SCHEDULED
Status: DISCONTINUED | OUTPATIENT
Start: 2021-02-25 | End: 2021-02-28 | Stop reason: HOSPADM

## 2021-02-25 RX ORDER — IBUPROFEN 800 MG/1
800 TABLET ORAL EVERY 6 HOURS PRN
Status: DISCONTINUED | OUTPATIENT
Start: 2021-02-26 | End: 2021-02-25

## 2021-02-25 RX ORDER — MODIFIED LANOLIN
OINTMENT (GRAM) TOPICAL
Status: DISCONTINUED | OUTPATIENT
Start: 2021-02-25 | End: 2021-02-28 | Stop reason: HOSPADM

## 2021-02-25 RX ORDER — SODIUM CHLORIDE, SODIUM LACTATE, POTASSIUM CHLORIDE, CALCIUM CHLORIDE 600; 310; 30; 20 MG/100ML; MG/100ML; MG/100ML; MG/100ML
INJECTION, SOLUTION INTRAVENOUS CONTINUOUS
Status: DISCONTINUED | OUTPATIENT
Start: 2021-02-25 | End: 2021-02-25

## 2021-02-25 RX ORDER — NALOXONE HYDROCHLORIDE 0.4 MG/ML
0.4 INJECTION, SOLUTION INTRAMUSCULAR; INTRAVENOUS; SUBCUTANEOUS PRN
Status: DISCONTINUED | OUTPATIENT
Start: 2021-02-25 | End: 2021-02-28 | Stop reason: HOSPADM

## 2021-02-25 RX ORDER — SODIUM CHLORIDE, SODIUM LACTATE, POTASSIUM CHLORIDE, AND CALCIUM CHLORIDE .6; .31; .03; .02 G/100ML; G/100ML; G/100ML; G/100ML
1000 INJECTION, SOLUTION INTRAVENOUS ONCE
Status: COMPLETED | OUTPATIENT
Start: 2021-02-25 | End: 2021-02-25

## 2021-02-25 RX ORDER — ONDANSETRON 2 MG/ML
INJECTION INTRAMUSCULAR; INTRAVENOUS PRN
Status: DISCONTINUED | OUTPATIENT
Start: 2021-02-25 | End: 2021-02-25 | Stop reason: SDUPTHER

## 2021-02-25 RX ORDER — SODIUM CHLORIDE 0.9 % (FLUSH) 0.9 %
10 SYRINGE (ML) INJECTION EVERY 12 HOURS SCHEDULED
Status: DISCONTINUED | OUTPATIENT
Start: 2021-02-25 | End: 2021-02-25

## 2021-02-25 RX ORDER — DIPHENHYDRAMINE HYDROCHLORIDE 50 MG/ML
12.5 INJECTION INTRAMUSCULAR; INTRAVENOUS EVERY 6 HOURS PRN
Status: DISCONTINUED | OUTPATIENT
Start: 2021-02-25 | End: 2021-02-28 | Stop reason: HOSPADM

## 2021-02-25 RX ORDER — ONDANSETRON 4 MG/1
8 TABLET, ORALLY DISINTEGRATING ORAL EVERY 8 HOURS PRN
Status: DISCONTINUED | OUTPATIENT
Start: 2021-02-25 | End: 2021-02-28 | Stop reason: HOSPADM

## 2021-02-25 RX ORDER — KETOROLAC TROMETHAMINE 30 MG/ML
30 INJECTION, SOLUTION INTRAMUSCULAR; INTRAVENOUS EVERY 6 HOURS PRN
Status: DISCONTINUED | OUTPATIENT
Start: 2021-02-25 | End: 2021-02-26

## 2021-02-25 RX ORDER — ONDANSETRON 2 MG/ML
4 INJECTION INTRAMUSCULAR; INTRAVENOUS EVERY 6 HOURS PRN
Status: DISCONTINUED | OUTPATIENT
Start: 2021-02-25 | End: 2021-02-25

## 2021-02-25 RX ORDER — OXYCODONE HYDROCHLORIDE AND ACETAMINOPHEN 5; 325 MG/1; MG/1
2 TABLET ORAL EVERY 4 HOURS PRN
Status: DISCONTINUED | OUTPATIENT
Start: 2021-02-25 | End: 2021-02-25

## 2021-02-25 RX ORDER — OXYCODONE HYDROCHLORIDE AND ACETAMINOPHEN 5; 325 MG/1; MG/1
1 TABLET ORAL EVERY 4 HOURS PRN
Status: DISCONTINUED | OUTPATIENT
Start: 2021-02-25 | End: 2021-02-25

## 2021-02-25 RX ORDER — DOCUSATE SODIUM 100 MG/1
100 CAPSULE, LIQUID FILLED ORAL 2 TIMES DAILY
Status: DISCONTINUED | OUTPATIENT
Start: 2021-02-25 | End: 2021-02-28 | Stop reason: HOSPADM

## 2021-02-25 RX ORDER — FERROUS SULFATE 325(65) MG
325 TABLET ORAL 2 TIMES DAILY WITH MEALS
Status: DISCONTINUED | OUTPATIENT
Start: 2021-02-26 | End: 2021-02-28 | Stop reason: HOSPADM

## 2021-02-25 RX ORDER — OXYCODONE HYDROCHLORIDE 5 MG/1
5 TABLET ORAL EVERY 4 HOURS PRN
Status: DISCONTINUED | OUTPATIENT
Start: 2021-02-25 | End: 2021-02-28 | Stop reason: HOSPADM

## 2021-02-25 RX ORDER — SODIUM CHLORIDE 0.9 % (FLUSH) 0.9 %
10 SYRINGE (ML) INJECTION PRN
Status: DISCONTINUED | OUTPATIENT
Start: 2021-02-25 | End: 2021-02-25

## 2021-02-25 RX ORDER — IBUPROFEN 800 MG/1
800 TABLET ORAL EVERY 6 HOURS PRN
Status: DISCONTINUED | OUTPATIENT
Start: 2021-02-26 | End: 2021-02-26

## 2021-02-25 RX ORDER — SODIUM CHLORIDE 0.9 % (FLUSH) 0.9 %
10 SYRINGE (ML) INJECTION PRN
Status: DISCONTINUED | OUTPATIENT
Start: 2021-02-25 | End: 2021-02-28 | Stop reason: HOSPADM

## 2021-02-25 RX ORDER — BUPIVACAINE HYDROCHLORIDE 7.5 MG/ML
INJECTION, SOLUTION INTRASPINAL PRN
Status: DISCONTINUED | OUTPATIENT
Start: 2021-02-25 | End: 2021-02-25 | Stop reason: SDUPTHER

## 2021-02-25 RX ORDER — ACETAMINOPHEN 325 MG/1
650 TABLET ORAL EVERY 4 HOURS
Status: DISCONTINUED | OUTPATIENT
Start: 2021-02-25 | End: 2021-02-26

## 2021-02-25 RX ORDER — SODIUM CHLORIDE, SODIUM LACTATE, POTASSIUM CHLORIDE, CALCIUM CHLORIDE 600; 310; 30; 20 MG/100ML; MG/100ML; MG/100ML; MG/100ML
INJECTION, SOLUTION INTRAVENOUS CONTINUOUS
Status: DISCONTINUED | OUTPATIENT
Start: 2021-02-25 | End: 2021-02-28 | Stop reason: HOSPADM

## 2021-02-25 RX ORDER — METHYLERGONOVINE MALEATE 0.2 MG/ML
200 INJECTION INTRAVENOUS PRN
Status: DISCONTINUED | OUTPATIENT
Start: 2021-02-25 | End: 2021-02-28 | Stop reason: HOSPADM

## 2021-02-25 RX ORDER — IBUPROFEN 800 MG/1
800 TABLET ORAL EVERY 6 HOURS PRN
Status: DISCONTINUED | OUTPATIENT
Start: 2021-02-25 | End: 2021-02-25

## 2021-02-25 RX ORDER — ONDANSETRON 2 MG/ML
4 INJECTION INTRAMUSCULAR; INTRAVENOUS EVERY 6 HOURS PRN
Status: DISCONTINUED | OUTPATIENT
Start: 2021-02-25 | End: 2021-02-28 | Stop reason: HOSPADM

## 2021-02-25 RX ORDER — METOCLOPRAMIDE HYDROCHLORIDE 5 MG/ML
10 INJECTION INTRAMUSCULAR; INTRAVENOUS ONCE
Status: COMPLETED | OUTPATIENT
Start: 2021-02-25 | End: 2021-02-25

## 2021-02-25 RX ORDER — TRISODIUM CITRATE DIHYDRATE AND CITRIC ACID MONOHYDRATE 500; 334 MG/5ML; MG/5ML
30 SOLUTION ORAL ONCE
Status: COMPLETED | OUTPATIENT
Start: 2021-02-25 | End: 2021-02-25

## 2021-02-25 RX ORDER — OXYCODONE HYDROCHLORIDE AND ACETAMINOPHEN 5; 325 MG/1; MG/1
1 TABLET ORAL EVERY 4 HOURS PRN
Status: DISCONTINUED | OUTPATIENT
Start: 2021-02-26 | End: 2021-02-26

## 2021-02-25 RX ORDER — OXYCODONE HYDROCHLORIDE AND ACETAMINOPHEN 5; 325 MG/1; MG/1
2 TABLET ORAL EVERY 4 HOURS PRN
Status: DISCONTINUED | OUTPATIENT
Start: 2021-02-26 | End: 2021-02-26

## 2021-02-25 RX ORDER — MORPHINE SULFATE 1 MG/ML
INJECTION, SOLUTION EPIDURAL; INTRATHECAL; INTRAVENOUS PRN
Status: DISCONTINUED | OUTPATIENT
Start: 2021-02-25 | End: 2021-02-25 | Stop reason: SDUPTHER

## 2021-02-25 RX ORDER — NALBUPHINE HCL 10 MG/ML
5 AMPUL (ML) INJECTION EVERY 4 HOURS PRN
Status: DISCONTINUED | OUTPATIENT
Start: 2021-02-25 | End: 2021-02-28 | Stop reason: HOSPADM

## 2021-02-25 RX ADMIN — PHENYLEPHRINE HYDROCHLORIDE 200 MCG: 10 INJECTION INTRAVENOUS at 07:50

## 2021-02-25 RX ADMIN — DOCUSATE SODIUM 100 MG: 100 CAPSULE, LIQUID FILLED ORAL at 21:36

## 2021-02-25 RX ADMIN — MORPHINE SULFATE 0.15 MG: 1 INJECTION, SOLUTION EPIDURAL; INTRATHECAL; INTRAVENOUS at 07:26

## 2021-02-25 RX ADMIN — OXYCODONE 5 MG: 5 TABLET ORAL at 22:17

## 2021-02-25 RX ADMIN — KETOROLAC TROMETHAMINE 30 MG: 30 INJECTION, SOLUTION INTRAMUSCULAR; INTRAVENOUS at 10:15

## 2021-02-25 RX ADMIN — Medication 87.3 MILLI-UNITS/MIN: at 08:05

## 2021-02-25 RX ADMIN — SODIUM CHLORIDE, POTASSIUM CHLORIDE, SODIUM LACTATE AND CALCIUM CHLORIDE: 600; 310; 30; 20 INJECTION, SOLUTION INTRAVENOUS at 13:14

## 2021-02-25 RX ADMIN — ACETAMINOPHEN 650 MG: 325 TABLET, FILM COATED ORAL at 14:23

## 2021-02-25 RX ADMIN — KETOROLAC TROMETHAMINE 30 MG: 30 INJECTION, SOLUTION INTRAMUSCULAR; INTRAVENOUS at 22:54

## 2021-02-25 RX ADMIN — OXYCODONE 5 MG: 5 TABLET ORAL at 14:23

## 2021-02-25 RX ADMIN — PHENYLEPHRINE HYDROCHLORIDE 200 MCG: 10 INJECTION INTRAVENOUS at 08:01

## 2021-02-25 RX ADMIN — ACETAMINOPHEN 650 MG: 325 TABLET, FILM COATED ORAL at 19:22

## 2021-02-25 RX ADMIN — ONDANSETRON 4 MG: 2 INJECTION INTRAMUSCULAR; INTRAVENOUS at 07:33

## 2021-02-25 RX ADMIN — SODIUM CHLORIDE, POTASSIUM CHLORIDE, SODIUM LACTATE AND CALCIUM CHLORIDE: 600; 310; 30; 20 INJECTION, SOLUTION INTRAVENOUS at 07:20

## 2021-02-25 RX ADMIN — SODIUM CHLORIDE, POTASSIUM CHLORIDE, SODIUM LACTATE AND CALCIUM CHLORIDE: 600; 310; 30; 20 INJECTION, SOLUTION INTRAVENOUS at 07:46

## 2021-02-25 RX ADMIN — PHENYLEPHRINE HYDROCHLORIDE 200 MCG: 10 INJECTION INTRAVENOUS at 07:39

## 2021-02-25 RX ADMIN — PHENYLEPHRINE HYDROCHLORIDE 200 MCG: 10 INJECTION INTRAVENOUS at 07:46

## 2021-02-25 RX ADMIN — SODIUM CITRATE AND CITRIC ACID MONOHYDRATE 30 ML: 500; 334 SOLUTION ORAL at 06:19

## 2021-02-25 RX ADMIN — NALBUPHINE HYDROCHLORIDE 5 MG: 10 INJECTION, SOLUTION INTRAMUSCULAR; INTRAVENOUS; SUBCUTANEOUS at 09:18

## 2021-02-25 RX ADMIN — PHENYLEPHRINE HYDROCHLORIDE 100 MCG: 10 INJECTION INTRAVENOUS at 07:31

## 2021-02-25 RX ADMIN — BUPIVACAINE HYDROCHLORIDE IN DEXTROSE 1.7 ML: 7.5 INJECTION, SOLUTION SUBARACHNOID at 07:26

## 2021-02-25 RX ADMIN — FAMOTIDINE 20 MG: 10 INJECTION INTRAVENOUS at 07:22

## 2021-02-25 RX ADMIN — PHENYLEPHRINE HYDROCHLORIDE 100 MCG: 10 INJECTION INTRAVENOUS at 07:28

## 2021-02-25 RX ADMIN — ONDANSETRON 4 MG: 2 INJECTION INTRAMUSCULAR; INTRAVENOUS at 13:14

## 2021-02-25 RX ADMIN — KETOROLAC TROMETHAMINE 30 MG: 30 INJECTION, SOLUTION INTRAMUSCULAR; INTRAVENOUS at 16:55

## 2021-02-25 RX ADMIN — METOCLOPRAMIDE 10 MG: 5 INJECTION, SOLUTION INTRAMUSCULAR; INTRAVENOUS at 07:22

## 2021-02-25 RX ADMIN — OXYCODONE 5 MG: 5 TABLET ORAL at 18:21

## 2021-02-25 RX ADMIN — SODIUM CHLORIDE, POTASSIUM CHLORIDE, SODIUM LACTATE AND CALCIUM CHLORIDE 1000 ML: 600; 310; 30; 20 INJECTION, SOLUTION INTRAVENOUS at 06:19

## 2021-02-25 RX ADMIN — ENOXAPARIN SODIUM 60 MG: 60 INJECTION SUBCUTANEOUS at 21:37

## 2021-02-25 RX ADMIN — CEFOXITIN 2000 MG: 2 INJECTION, POWDER, FOR SOLUTION INTRAVENOUS at 07:00

## 2021-02-25 RX ADMIN — Medication 999 ML/HR: at 07:45

## 2021-02-25 RX ADMIN — PHENYLEPHRINE HYDROCHLORIDE 200 MCG: 10 INJECTION INTRAVENOUS at 07:32

## 2021-02-25 ASSESSMENT — PAIN SCALES - GENERAL
PAINLEVEL_OUTOF10: 6
PAINLEVEL_OUTOF10: 1
PAINLEVEL_OUTOF10: 4
PAINLEVEL_OUTOF10: 4

## 2021-02-25 ASSESSMENT — PULMONARY FUNCTION TESTS
PIF_VALUE: 0
PIF_VALUE: 1
PIF_VALUE: 0
PIF_VALUE: 1
PIF_VALUE: 0

## 2021-02-25 NOTE — ANESTHESIA PRE PROCEDURE
Department of Anesthesiology  Preprocedure Note       Name:  Maureen Munoz   Age:  23 y.o.  :  2000                                          MRN:  [id number]         Date:  2021      Surgeon: Ike Sandoval):  Joanne Henley MD    Procedure: Procedure(s):   SECTION    Medications prior to admission:   Prior to Admission medications    Medication Sig Start Date End Date Taking? Authorizing Provider   albuterol sulfate HFA (PROAIR HFA) 108 (90 Base) MCG/ACT inhaler Inhale 2 puffs into the lungs every 6 hours as needed (Cough, wheezing, Shortness of breath) 20  Betsy Beauchamp DO   Prenatal MV-Min-Fe Fum-FA-DHA (PRENATAL 1 PO) Take by mouth    Historical Provider, MD   ondansetron (ZOFRAN ODT) 4 MG disintegrating tablet Take 1 tablet by mouth every 12 hours as needed for Nausea or Vomiting 20   ROSEANN Garg       Current medications:    Current Facility-Administered Medications   Medication Dose Route Frequency Provider Last Rate Last Admin    lactated ringers infusion   Intravenous Continuous Amine RAVI Cottrell MD        lactated ringers bolus  1,000 mL Intravenous Once Joanne Henley MD 1,000 mL/hr at 21 0619 1,000 mL at 21 0619    sodium chloride flush 0.9 % injection 10 mL  10 mL Intravenous 2 times per day Amine RAVI Cottrell MD        sodium chloride flush 0.9 % injection 10 mL  10 mL Intravenous PRN Amine RAVI Cottrell MD        ondansetron (ZOFRAN) injection 4 mg  4 mg Intravenous Q6H PRN Amine RAVI Cottrell MD        oxytocin (PITOCIN) 30 units in 500 mL infusion  909 joseph-units/min Intravenous PRN Amine RAVI Cottrell MD        And    oxytocin (PITOCIN) 30 units in 500 mL infusion  87.3 joseph-units/min Intravenous PRN Amine RAVI Cottrell MD           Allergies:     Allergies   Allergen Reactions    Pineapple Hives and Shortness Of Breath       Problem List:    Patient Active Problem List   Diagnosis Code  Complicated pregnancy, second trimester O26.92    Two vessel umbilical cord I46.3    Delivery by  section for breech presentation O32. 1XX0       Past Medical History:        Diagnosis Date    Depression     Two vessel umbilical cord 8272       Past Surgical History:  No past surgical history on file. Social History:    Social History     Tobacco Use    Smoking status: Former Smoker     Types: Cigarettes     Quit date: 2020     Years since quittin.4    Smokeless tobacco: Never Used   Substance Use Topics    Alcohol use: Not Currently                                Counseling given: Not Answered      Vital Signs (Current):   Vitals:    21 0558   BP: 127/79   Pulse: 92   Resp: 16   Temp: 98 °F (36.7 °C)   TempSrc: Oral                                              BP Readings from Last 3 Encounters:   21 127/79   21 131/67   21 137/78       NPO Status:                                                                                 BMI:   Wt Readings from Last 3 Encounters:   20 211 lb (95.7 kg)   20 213 lb (96.6 kg)   11/15/19 215 lb (97.5 kg)     There is no height or weight on file to calculate BMI.    CBC:   Lab Results   Component Value Date    WBC 14.4 2021    RBC 3.65 2021    HGB 10.8 2021    HCT 32.8 2021    MCV 89.9 2021    RDW 13.5 2021     2021       CMP:   Lab Results   Component Value Date     2020    K 3.7 2020     2020    CO2 18 2020    BUN 3 2020    CREATININE 0.5 2020    GFRAA >60 2020    LABGLOM >60 2020    GLUCOSE 174 2021    GLUCOSE 98 2020    PROT 7.2 2020    CALCIUM 9.0 2020    BILITOT 0.3 2020    ALKPHOS 89 2020    AST 18 2020    ALT 24 2020       POC Tests: No results for input(s): POCGLU, POCNA, POCK, POCCL, POCBUN, POCHEMO, POCHCT in the last 72 hours. Coags: No results found for: PROTIME, INR, APTT    HCG (If Applicable):   Lab Results   Component Value Date    PREGTESTUR NEGATIVE 02/17/2020        ABGs: No results found for: PHART, PO2ART, BFL5RIE, MQK6TPN, BEART, L0ZYSARL     Type & Screen (If Applicable):  No results found for: LABABO, LABRH    Drug/Infectious Status (If Applicable):  No results found for: HIV, HEPCAB    COVID-19 Screening (If Applicable):   Lab Results   Component Value Date    COVID19 Not Detected 02/19/2021         Anesthesia Evaluation  Patient summary reviewed and Nursing notes reviewed no history of anesthetic complications:   Airway: Mallampati: III  TM distance: >3 FB   Neck ROM: full  Mouth opening: > = 3 FB Dental:          Pulmonary:Negative Pulmonary ROS breath sounds clear to auscultation                            ROS comment: Hx. Of tobacco abuse - quit 4 months ago   Cardiovascular:  Exercise tolerance: good (>4 METS),   (+) murmur,         Rhythm: regular  Rate: normal           Beta Blocker:  Not on Beta Blocker         Neuro/Psych:   (+) psychiatric history: stable with treatmentdepression/anxiety              ROS comment: Hx. Of cerebral aneurysm - unknown size or location. Last follow up >6 years ago. No imaging studies in the eRecord. No notes from neurology. Denies specific symptoms other than rare headaches which are mild. Denies dizziness, visual changes, or balance problems. GI/Hepatic/Renal:   (+) GERD:, morbid obesity          Endo/Other:    (+) blood dyscrasia: anemia:., .          Pt had no PAT visit       Abdominal:   (+) obese,         Vascular: negative vascular ROS. Anesthesia Plan      spinal     ASA 3     (Pepcid and Reglan pre-op. Discussed risks and benefits of GA vs. Spinal.  Discussed risks associated with her underlying cerebral aneurysm. Accepts risks, is in understanding of the anesthetic plan, and chooses to proceed.   PONV prophylaxis)

## 2021-02-25 NOTE — H&P
Subjective:      Vanessa Devine is an 23 y.o. female at 44 and 0/7 weeks gestation presenting with   Chief Complaint   Patient presents with    Scheduled    due to Breech presentation. She is also complaining of contractions every a few minutes lasting few seconds. Other associated symptoms include low back pain. Fetal Movement: normal.  Bedside ultrasound confirmed breech presentation. Past Medical History:   Diagnosis Date    Depression     Two vessel umbilical cord        Review of Systems  Pertinent items are noted in HPI. Objective:      /79   Pulse 92   Temp 98 °F (36.7 °C) (Oral)   Resp 16   LMP 2020   Blood pressure 127/79, pulse 92, temperature 98 °F (36.7 °C), temperature source Oral, resp. rate 16, last menstrual period 2020. General:   alert, appears stated age and cooperative   Cervix:   closed.    FHT:   140 BPM     Lab Review    CBC:   Lab Results   Component Value Date    WBC 14.4 2021    RBC 3.65 2021    HGB 10.8 2021    HCT 32.8 2021    MCV 89.9 2021    MCH 29.6 2021    MCHC 32.9 2021    RDW 13.5 2021     2021    MPV 11.4 2021     CMP:    Lab Results   Component Value Date     2021    K 4.4 2021     2021    CO2 19 2021    BUN 7 2021    CREATININE 0.5 2021    GFRAA >60 2021    LABGLOM >60 2021    GLUCOSE 86 2021    PROT 7.2 2020    LABALBU 3.9 2020    CALCIUM 10.2 2021    BILITOT 0.3 2020    ALKPHOS 89 2020    AST 18 2020    ALT 24 2020     U/A:    Lab Results   Component Value Date    COLORU Yellow 2020    PHUR 6.0 2020    WBCUA 1-3 2020    RBCUA 0-1 2020    BACTERIA MANY 2020    CLARITYU SLCLOUDY 2020    SPECGRAV <=1.005 2020    LEUKOCYTESUR TRACE 2020    UROBILINOGEN 0.2 2020    BILIRUBINUR Negative 2020    BLOODU TRACE 12/05/2020    GLUCOSEU Negative 12/05/2020          Assessment:     Full term pregnancy  Breech presentation  2 vessel cord     Plan:     Primary c/s    Amine R MD Ronit,2/25/2021 7:14 AM

## 2021-02-25 NOTE — PROGRESS NOTES
39w0d patient arrived to unit ambulatory for schedule  section for breech presentation. Patient denies any contractions, lof, or bleeding and perceives fetal movement. Placed on EFM and TOCO with audible fetal movement. Oriented to room with call light in reach.

## 2021-02-25 NOTE — OP NOTE
monocryl. Tubes and ovaries were inspected and appeared to be normal. The gutters were cleared of any blood clots and debris. The operative field appeared to be hemostatic. The Mobius retractor was removed. Correct needle, sponge and instrument count was reported and the abdomen was then closed in layers using #1 Stratafix for the fascia and the subcuticular stapler for the skin. Steristrips were applied followed by a sterile dressing and the patient was then transferred to the recovery room in good stable condition.     Nayely Cottrell

## 2021-02-25 NOTE — ANESTHESIA PROCEDURE NOTES
Spinal Block    Patient location during procedure: OR  Start time: 2/25/2021 7:20 AM  End time: 2/25/2021 7:26 AM  Reason for block: primary anesthetic  Staffing  Performed: resident/CRNA   Anesthesiologist: Audie Germain DO  Resident/CRNA: JAYCOB Gamez CRNA  Preanesthetic Checklist  Completed: patient identified, IV checked, site marked, risks and benefits discussed, surgical consent, monitors and equipment checked, pre-op evaluation, timeout performed, anesthesia consent given, oxygen available and patient being monitored  Spinal Block  Patient position: sitting  Prep: Betadine  Patient monitoring: continuous pulse ox and frequent blood pressure checks  Approach: midline  Location: L3/L4  Provider prep: mask and sterile gloves  Local infiltration: lidocaine  Agent: bupivacaine  Adjuvant: duramorph  Dose: 1.7  Dose: 1.7  Needle  Needle type: Pencan   Needle gauge: 25 G  Needle length: 3.5 in  Assessment  Swirl obtained: Yes  CSF: clear  Attempts: 2  Hemodynamics: stable

## 2021-02-26 LAB
HCT VFR BLD CALC: 30.1 % (ref 34–48)
HEMOGLOBIN: 9.6 G/DL (ref 11.5–15.5)

## 2021-02-26 PROCEDURE — 1220000001 HC SEMI PRIVATE L&D R&B

## 2021-02-26 PROCEDURE — 6370000000 HC RX 637 (ALT 250 FOR IP): Performed by: OBSTETRICS & GYNECOLOGY

## 2021-02-26 PROCEDURE — 6360000002 HC RX W HCPCS: Performed by: OBSTETRICS & GYNECOLOGY

## 2021-02-26 PROCEDURE — 36415 COLL VENOUS BLD VENIPUNCTURE: CPT

## 2021-02-26 PROCEDURE — 85018 HEMOGLOBIN: CPT

## 2021-02-26 PROCEDURE — 85014 HEMATOCRIT: CPT

## 2021-02-26 RX ORDER — ACETAMINOPHEN 325 MG/1
650 TABLET ORAL EVERY 4 HOURS PRN
Status: DISCONTINUED | OUTPATIENT
Start: 2021-02-26 | End: 2021-02-28 | Stop reason: HOSPADM

## 2021-02-26 RX ORDER — OXYCODONE HYDROCHLORIDE AND ACETAMINOPHEN 5; 325 MG/1; MG/1
2 TABLET ORAL EVERY 4 HOURS PRN
Status: DISCONTINUED | OUTPATIENT
Start: 2021-02-26 | End: 2021-02-28 | Stop reason: HOSPADM

## 2021-02-26 RX ORDER — IBUPROFEN 800 MG/1
800 TABLET ORAL EVERY 6 HOURS PRN
Status: DISCONTINUED | OUTPATIENT
Start: 2021-02-26 | End: 2021-02-28 | Stop reason: HOSPADM

## 2021-02-26 RX ORDER — OXYCODONE HYDROCHLORIDE AND ACETAMINOPHEN 5; 325 MG/1; MG/1
1 TABLET ORAL EVERY 4 HOURS PRN
Status: DISCONTINUED | OUTPATIENT
Start: 2021-02-26 | End: 2021-02-28 | Stop reason: HOSPADM

## 2021-02-26 RX ADMIN — FERROUS SULFATE TAB 325 MG (65 MG ELEMENTAL FE) 325 MG: 325 (65 FE) TAB at 08:14

## 2021-02-26 RX ADMIN — OXYCODONE AND ACETAMINOPHEN 2 TABLET: 5; 325 TABLET ORAL at 18:25

## 2021-02-26 RX ADMIN — OXYCODONE AND ACETAMINOPHEN 2 TABLET: 5; 325 TABLET ORAL at 02:29

## 2021-02-26 RX ADMIN — FERROUS SULFATE TAB 325 MG (65 MG ELEMENTAL FE) 325 MG: 325 (65 FE) TAB at 17:23

## 2021-02-26 RX ADMIN — OXYCODONE AND ACETAMINOPHEN 2 TABLET: 5; 325 TABLET ORAL at 08:15

## 2021-02-26 RX ADMIN — IBUPROFEN 800 MG: 800 TABLET, FILM COATED ORAL at 05:07

## 2021-02-26 RX ADMIN — IBUPROFEN 800 MG: 800 TABLET, FILM COATED ORAL at 18:25

## 2021-02-26 RX ADMIN — DOCUSATE SODIUM 100 MG: 100 CAPSULE, LIQUID FILLED ORAL at 08:14

## 2021-02-26 RX ADMIN — OXYCODONE AND ACETAMINOPHEN 2 TABLET: 5; 325 TABLET ORAL at 14:19

## 2021-02-26 RX ADMIN — OXYCODONE AND ACETAMINOPHEN 2 TABLET: 5; 325 TABLET ORAL at 23:05

## 2021-02-26 RX ADMIN — ENOXAPARIN SODIUM 60 MG: 60 INJECTION SUBCUTANEOUS at 20:19

## 2021-02-26 RX ADMIN — DOCUSATE SODIUM 100 MG: 100 CAPSULE, LIQUID FILLED ORAL at 20:19

## 2021-02-26 ASSESSMENT — PAIN SCALES - GENERAL
PAINLEVEL_OUTOF10: 9
PAINLEVEL_OUTOF10: 9
PAINLEVEL_OUTOF10: 7

## 2021-02-26 NOTE — PROGRESS NOTES
Continued care of patient. Plan of care for day reviewed with patient, verbalizes understanding and all questions answered. Patient resting comfortably in bed, call light within reach.  Pt encouraged to call with any questions, needs, or concerns

## 2021-02-26 NOTE — PROGRESS NOTES
Pt up to wheelchair and taken to see infant in special care nursery at this time. Pt tolerated with minimal discomfort.

## 2021-02-26 NOTE — PLAN OF CARE
Problem: Pain - Acute:  Goal: Pain level will decrease  Description: Pain level will decrease  Outcome: Met This Shift   Medicate as needed, comfort measures offered

## 2021-02-26 NOTE — PROGRESS NOTES
Pt transported via wheelchair from [facility] to 1. Pt resting comfortably in bed. Call light in reach.

## 2021-02-26 NOTE — PLAN OF CARE
Problem: Infection - Surgical Site:  Goal: Will show no infection signs and symptoms  Description: Will show no infection signs and symptoms  Outcome: Met This Shift   Abd incision clean and dry

## 2021-02-26 NOTE — PROGRESS NOTES
Subjective:     Postpartum Day 1:  Delivery    The patient feels well. The patient denies emotional concerns. Pain is well controlled with current medications. The baby is well. Urinary output is adequate. The patient is ambulating well. The patient is tolerating a normal diet. Flatus has been passed. Objective:       Vitals:    21 0809   BP: 126/61   Pulse: 86   Resp: 16   Temp: 98.8 °F (37.1 °C)   SpO2: 97%         General:    alert, appears stated age and cooperative   Bowel Sounds:  active   Lochia:  appropriate   Uterine Fundus:   firm   Incision:  healing well, no significant drainage, no dehiscence, no significant erythema   DVT Evaluation:  No evidence of DVT seen on physical exam.     CBC   Lab Results   Component Value Date    WBC 14.4 (H) 2021    HGB 9.6 (L) 2021    HCT 30.1 (L) 2021    MCV 89.9 2021     2021        Assessment:     Status post  section. Doing well postoperatively. Plan:     Continue current care.

## 2021-02-27 PROCEDURE — 1220000001 HC SEMI PRIVATE L&D R&B

## 2021-02-27 PROCEDURE — 6370000000 HC RX 637 (ALT 250 FOR IP): Performed by: OBSTETRICS & GYNECOLOGY

## 2021-02-27 PROCEDURE — 6360000002 HC RX W HCPCS: Performed by: OBSTETRICS & GYNECOLOGY

## 2021-02-27 RX ADMIN — IBUPROFEN 800 MG: 800 TABLET, FILM COATED ORAL at 00:28

## 2021-02-27 RX ADMIN — OXYCODONE AND ACETAMINOPHEN 2 TABLET: 5; 325 TABLET ORAL at 03:25

## 2021-02-27 RX ADMIN — DOCUSATE SODIUM 100 MG: 100 CAPSULE, LIQUID FILLED ORAL at 20:31

## 2021-02-27 RX ADMIN — OXYCODONE AND ACETAMINOPHEN 1 TABLET: 5; 325 TABLET ORAL at 19:24

## 2021-02-27 RX ADMIN — IBUPROFEN 800 MG: 800 TABLET, FILM COATED ORAL at 07:23

## 2021-02-27 RX ADMIN — OXYCODONE AND ACETAMINOPHEN 2 TABLET: 5; 325 TABLET ORAL at 14:31

## 2021-02-27 RX ADMIN — FERROUS SULFATE TAB 325 MG (65 MG ELEMENTAL FE) 325 MG: 325 (65 FE) TAB at 07:24

## 2021-02-27 RX ADMIN — ENOXAPARIN SODIUM 60 MG: 60 INJECTION SUBCUTANEOUS at 20:32

## 2021-02-27 RX ADMIN — FERROUS SULFATE TAB 325 MG (65 MG ELEMENTAL FE) 325 MG: 325 (65 FE) TAB at 17:41

## 2021-02-27 RX ADMIN — DOCUSATE SODIUM 100 MG: 100 CAPSULE, LIQUID FILLED ORAL at 07:24

## 2021-02-27 RX ADMIN — OXYCODONE AND ACETAMINOPHEN 2 TABLET: 5; 325 TABLET ORAL at 09:49

## 2021-02-27 RX ADMIN — IBUPROFEN 800 MG: 800 TABLET, FILM COATED ORAL at 17:41

## 2021-02-27 ASSESSMENT — PAIN SCALES - GENERAL
PAINLEVEL_OUTOF10: 8
PAINLEVEL_OUTOF10: 8
PAINLEVEL_OUTOF10: 9
PAINLEVEL_OUTOF10: 3
PAINLEVEL_OUTOF10: 8
PAINLEVEL_OUTOF10: 7
PAINLEVEL_OUTOF10: 8

## 2021-02-27 ASSESSMENT — PAIN DESCRIPTION - PROGRESSION: CLINICAL_PROGRESSION: GRADUALLY WORSENING

## 2021-02-27 ASSESSMENT — PAIN - FUNCTIONAL ASSESSMENT: PAIN_FUNCTIONAL_ASSESSMENT: ACTIVITIES ARE NOT PREVENTED

## 2021-02-27 ASSESSMENT — PAIN DESCRIPTION - PAIN TYPE: TYPE: SURGICAL PAIN;ACUTE PAIN

## 2021-02-27 NOTE — LACTATION NOTE
Discussed pumping with pt. Pt was starting to pump with EBP. Wrote pumping schedule on white board. Instructed pt that if size 24mm was not comfortable for pumping, size 27mm is available. Pt has all of the collection supplies that she needs.

## 2021-02-28 PROCEDURE — 90471 IMMUNIZATION ADMIN: CPT | Performed by: OBSTETRICS & GYNECOLOGY

## 2021-02-28 PROCEDURE — 6360000002 HC RX W HCPCS: Performed by: OBSTETRICS & GYNECOLOGY

## 2021-02-28 PROCEDURE — 6370000000 HC RX 637 (ALT 250 FOR IP): Performed by: OBSTETRICS & GYNECOLOGY

## 2021-02-28 PROCEDURE — 90686 IIV4 VACC NO PRSV 0.5 ML IM: CPT | Performed by: OBSTETRICS & GYNECOLOGY

## 2021-02-28 PROCEDURE — 90715 TDAP VACCINE 7 YRS/> IM: CPT | Performed by: OBSTETRICS & GYNECOLOGY

## 2021-02-28 PROCEDURE — G0008 ADMIN INFLUENZA VIRUS VAC: HCPCS | Performed by: OBSTETRICS & GYNECOLOGY

## 2021-02-28 RX ORDER — OXYCODONE HYDROCHLORIDE AND ACETAMINOPHEN 5; 325 MG/1; MG/1
1 TABLET ORAL EVERY 6 HOURS PRN
Qty: 20 TABLET | Refills: 0 | Status: SHIPPED | OUTPATIENT
Start: 2021-02-28 | End: 2021-03-05

## 2021-02-28 RX ORDER — IBUPROFEN 800 MG/1
800 TABLET ORAL EVERY 6 HOURS PRN
Qty: 120 TABLET | Refills: 1 | Status: SHIPPED | OUTPATIENT
Start: 2021-02-28 | End: 2022-01-25

## 2021-02-28 RX ADMIN — OXYCODONE AND ACETAMINOPHEN 2 TABLET: 5; 325 TABLET ORAL at 00:25

## 2021-02-28 RX ADMIN — TETANUS TOXOID, REDUCED DIPHTHERIA TOXOID AND ACELLULAR PERTUSSIS VACCINE, ADSORBED 0.5 ML: 5; 2.5; 8; 8; 2.5 SUSPENSION INTRAMUSCULAR at 08:12

## 2021-02-28 RX ADMIN — OXYCODONE AND ACETAMINOPHEN 2 TABLET: 5; 325 TABLET ORAL at 08:11

## 2021-02-28 RX ADMIN — DOCUSATE SODIUM 100 MG: 100 CAPSULE, LIQUID FILLED ORAL at 08:11

## 2021-02-28 RX ADMIN — INFLUENZA A VIRUS A/VICTORIA/2454/2019 IVR-207 (H1N1) ANTIGEN (PROPIOLACTONE INACTIVATED), INFLUENZA A VIRUS A/HONG KONG/2671/2019 IVR-208 (H3N2) ANTIGEN (PROPIOLACTONE INACTIVATED), INFLUENZA B VIRUS B/VICTORIA/705/2018 BVR-11 ANTIGEN (PROPIOLACTONE INACTIVATED), INFLUENZA B VIRUS B/PHUKET/3073/2013 BVR-1B ANTIGEN (PROPIOLACTONE INACTIVATED) 0.5 ML: 15; 15; 15; 15 INJECTION, SUSPENSION INTRAMUSCULAR at 08:13

## 2021-02-28 RX ADMIN — IBUPROFEN 800 MG: 800 TABLET, FILM COATED ORAL at 13:23

## 2021-02-28 RX ADMIN — IBUPROFEN 800 MG: 800 TABLET, FILM COATED ORAL at 04:35

## 2021-02-28 RX ADMIN — FERROUS SULFATE TAB 325 MG (65 MG ELEMENTAL FE) 325 MG: 325 (65 FE) TAB at 08:11

## 2021-02-28 ASSESSMENT — PAIN SCALES - GENERAL: PAINLEVEL_OUTOF10: 7

## 2021-02-28 ASSESSMENT — PAIN DESCRIPTION - PROGRESSION: CLINICAL_PROGRESSION: GRADUALLY WORSENING

## 2021-02-28 ASSESSMENT — PAIN DESCRIPTION - ORIENTATION: ORIENTATION: LOWER

## 2021-02-28 ASSESSMENT — PAIN - FUNCTIONAL ASSESSMENT
PAIN_FUNCTIONAL_ASSESSMENT: ACTIVITIES ARE NOT PREVENTED
PAIN_FUNCTIONAL_ASSESSMENT: 0-10

## 2021-02-28 ASSESSMENT — PAIN DESCRIPTION - PAIN TYPE: TYPE: ACUTE PAIN;SURGICAL PAIN

## 2021-02-28 NOTE — DISCHARGE SUMMARY
Obstetrical Discharge Form         Patients Name  Ishmael Nissen    Gestational Age:  39w3d    Antepartum complications: Breech and two-vessel cord    Date of Delivery:   2021       7:43 AM      Type of Delivery:   , Low Transverse [251]   Rupture Date/time:               Presentation:    Breech [3]   Position:                      Anesthesia:    Spinal [252]     Feeding method:         Delivered By:   Deanna RODRIGUES     Baby:       [id number]          Intrapartum complications: None  Postpartum complications: none  Discharge Date:   2021  Discharge Condition: Stable  Disposition:   Home    Plan:   Follow up    in 6 weeks

## 2021-02-28 NOTE — PROGRESS NOTES
Subjective:     Postpartum Day 2:  Delivery    The patient feels well. The patient denies emotional concerns. Pain is well controlled with current medications. The baby is well in [place]. Urinary output is adequate. The patient is ambulating well. The patient is tolerating a normal diet. Flatus has been passed. Objective:       Vitals:    21 2347   BP: 136/76   Pulse: 89   Resp: 16   Temp: 98 °F (36.7 °C)   SpO2:          General:    alert, appears stated age and cooperative   Bowel Sounds:  active   Lochia:  appropriate   Uterine Fundus:   firm   Incision:  healing well, no significant drainage, no dehiscence, no significant erythema   DVT Evaluation:  No evidence of DVT seen on physical exam.     CBC   Lab Results   Component Value Date    WBC 14.4 (H) 2021    HGB 9.6 (L) 2021    HCT 30.1 (L) 2021    MCV 89.9 2021     2021        Assessment:     Status post  section. Doing well postoperatively. Plan:     Continue current care.

## 2021-02-28 NOTE — PLAN OF CARE
Problem: Discharge Planning:  Goal: Discharged to appropriate level of care  Description: Discharged to appropriate level of care  2/28/2021 0825 by Jade Garcia RN  Outcome: Completed  2/28/2021 0000 by Jackie Butler RN  Outcome: Not Met This Shift     Problem: Fluid Volume - Imbalance:  Goal: Absence of postpartum hemorrhage signs and symptoms  Description: Absence of postpartum hemorrhage signs and symptoms  2/28/2021 0825 by Jade Garcia RN  Outcome: Completed  2/28/2021 0000 by Jackie Butler RN  Outcome: Met This Shift  Goal: Absence of imbalanced fluid volume signs and symptoms  Description: Absence of imbalanced fluid volume signs and symptoms  2/28/2021 0825 by Jade Garcia RN  Outcome: Completed  2/28/2021 0000 by Jackie Butler RN  Outcome: Met This Shift     Problem: Infection - Surgical Site:  Goal: Will show no infection signs and symptoms  Description: Will show no infection signs and symptoms  2/28/2021 0825 by Jade Garcia RN  Outcome: Completed  2/28/2021 0000 by Jackie Butler RN  Outcome: Met This Shift     Problem: Mood - Altered:  Goal: Mood stable  Description: Mood stable  2/28/2021 0825 by Jade Garcia RN  Outcome: Completed  2/28/2021 0000 by Jackie Butler RN  Outcome: Met This Shift     Problem: Nausea/Vomiting:  Goal: Absence of nausea/vomiting  Description: Absence of nausea/vomiting  2/28/2021 0825 by Jade Garcia RN  Outcome: Completed  2/28/2021 0000 by Jackie Butler RN  Outcome: Met This Shift     Problem: Pain - Acute:  Goal: Pain level will decrease  Description: Pain level will decrease  2/28/2021 0825 by Jade Garcia RN  Outcome: Completed  2/28/2021 0000 by Jackie Butler RN  Outcome: Met This Shift     Problem: Urinary Retention:  Goal: Urinary elimination within specified parameters  Description: Urinary elimination within specified parameters  2/28/2021 0825 by Jade Garcia RN  Outcome: Completed  2/28/2021 0000 by Jorgito Rolle Shamika Price RN  Outcome: Met This Shift     Problem: Venous Thromboembolism:  Goal: Will show no signs or symptoms of venous thromboembolism  Description: Will show no signs or symptoms of venous thromboembolism  2/28/2021 0825 by Heber Mendoza RN  Outcome: Completed  2/28/2021 0000 by Josie Person RN  Outcome: Met This Shift  Goal: Absence of signs or symptoms of impaired coagulation  Description: Absence of signs or symptoms of impaired coagulation  2/28/2021 0825 by Heber Mendoza RN  Outcome: Completed  2/28/2021 0000 by Josie Person RN  Outcome: Met This Shift     Problem: Pain:  Goal: Pain level will decrease  Description: Pain level will decrease  2/28/2021 0825 by Heber Mendoza RN  Outcome: Completed  2/28/2021 0000 by Josie Person RN  Outcome: Met This Shift  Goal: Control of acute pain  Description: Control of acute pain  2/28/2021 0825 by Heber Mendoza RN  Outcome: Completed  2/28/2021 0000 by Josie Person RN  Outcome: Met This Shift  Goal: Control of chronic pain  Description: Control of chronic pain  2/28/2021 0825 by Heber Mendoza RN  Outcome: Completed  2/28/2021 0000 by Josie Person RN  Outcome: Met This Shift

## 2021-03-01 VITALS
HEIGHT: 66 IN | SYSTOLIC BLOOD PRESSURE: 126 MMHG | TEMPERATURE: 98.3 F | BODY MASS INDEX: 37.77 KG/M2 | WEIGHT: 235 LBS | DIASTOLIC BLOOD PRESSURE: 72 MMHG | HEART RATE: 85 BPM | OXYGEN SATURATION: 98 % | RESPIRATION RATE: 14 BRPM

## 2022-01-25 ENCOUNTER — INITIAL PRENATAL (OUTPATIENT)
Dept: OBGYN CLINIC | Age: 22
End: 2022-01-25
Payer: COMMERCIAL

## 2022-01-25 ENCOUNTER — ANCILLARY PROCEDURE (OUTPATIENT)
Dept: OBGYN CLINIC | Age: 22
End: 2022-01-25
Payer: COMMERCIAL

## 2022-01-25 VITALS
BODY MASS INDEX: 35.85 KG/M2 | HEART RATE: 94 BPM | DIASTOLIC BLOOD PRESSURE: 73 MMHG | WEIGHT: 222.13 LBS | SYSTOLIC BLOOD PRESSURE: 107 MMHG

## 2022-01-25 DIAGNOSIS — Z3A.14 14 WEEKS GESTATION OF PREGNANCY: ICD-10-CM

## 2022-01-25 DIAGNOSIS — E66.9 OBESITY, CLASS II, BMI 35-39.9: ICD-10-CM

## 2022-01-25 DIAGNOSIS — F19.11 H/O: SUBSTANCE ABUSE (HCC): ICD-10-CM

## 2022-01-25 DIAGNOSIS — Z3A.18 18 WEEKS GESTATION OF PREGNANCY: ICD-10-CM

## 2022-01-25 DIAGNOSIS — O09.292 HISTORY OF FETAL ABNORMALITY IN PREVIOUS PREGNANCY, CURRENTLY PREGNANT, SECOND TRIMESTER: Primary | ICD-10-CM

## 2022-01-25 DIAGNOSIS — O09.299 H/O FETAL ANOMALY IN PRIOR PREGNANCY, CURRENTLY PREGNANT: ICD-10-CM

## 2022-01-25 PROBLEM — Q27.0 TWO VESSEL UMBILICAL CORD: Status: RESOLVED | Noted: 2021-01-10 | Resolved: 2022-01-25

## 2022-01-25 PROBLEM — E66.812 OBESITY, CLASS II, BMI 35-39.9: Status: ACTIVE | Noted: 2022-01-25

## 2022-01-25 PROBLEM — Z3A.39 39 WEEKS GESTATION OF PREGNANCY: Status: RESOLVED | Noted: 2021-02-25 | Resolved: 2022-01-25

## 2022-01-25 PROBLEM — F32.A DEPRESSIVE DISORDER: Status: ACTIVE | Noted: 2018-11-04

## 2022-01-25 LAB
GLUCOSE URINE, POC: NORMAL
PROTEIN UA: NEGATIVE

## 2022-01-25 PROCEDURE — 81002 URINALYSIS NONAUTO W/O SCOPE: CPT | Performed by: OBSTETRICS & GYNECOLOGY

## 2022-01-25 PROCEDURE — 99203 OFFICE O/P NEW LOW 30 MIN: CPT

## 2022-01-25 PROCEDURE — 99203 OFFICE O/P NEW LOW 30 MIN: CPT | Performed by: OBSTETRICS & GYNECOLOGY

## 2022-01-25 PROCEDURE — 76805 OB US >/= 14 WKS SNGL FETUS: CPT | Performed by: OBSTETRICS & GYNECOLOGY

## 2022-01-25 PROCEDURE — 99999 PR OFFICE/OUTPT VISIT,PROCEDURE ONLY: CPT | Performed by: OBSTETRICS & GYNECOLOGY

## 2022-01-25 RX ORDER — POTASSIUM CHLORIDE 750 MG/1
CAPSULE, EXTENDED RELEASE ORAL
COMMUNITY
Start: 2022-01-12 | End: 2022-07-24

## 2022-01-25 NOTE — PROGRESS NOTES
Feels flutters. Denies bleeding leakage of fluid or contractions. Some cramps  History of allergies and headaches. Call your obstetrician for:    Bleeding, leakage of fluid, abdominal tenderness, headaches, burred vision or right upper quadrant  pain or increased in urinary frequency/burning.    Call if any questions or concerns

## 2022-01-25 NOTE — PATIENT INSTRUCTIONS
Patient Education        Weeks 18 to 22 of Your Pregnancy: Care Instructions  Overview     Your baby is continuing to develop quickly. Sometime between 18 and 22 weeks, you'll probably start to feel your baby move. At first, these small fetal movements feel like fluttering or \"butterflies. \" Or they may feel like gas bubbles. As your baby grows, these movements will become stronger. You may also notice that your baby hiccups. Babies at this stage can now suck their thumbs. You may find that your nausea and fatigue are gone. You may feel better overall and have more energy than you did in your first trimester. But you might now also have some new discomforts, like sleep problems or leg cramps. Talk to your doctor about things you can do at home to ease these problems. Follow-up care is a key part of your treatment and safety. Be sure to make and go to all appointments, and call your doctor if you are having problems. It's also a good idea to know your test results and keep a list of the medicines you take. How can you care for yourself at home? Ease sleep problems  · Avoid caffeine in drinks or chocolate late in the day. · Get some exercise every day. · Take a warm shower or bath before bed. · Have a light snack or glass of milk at bedtime. · Do relaxation exercises in bed to calm your mind and body. · Support your legs and back with extra pillows. Try a pillow between your legs if you sleep on your side. · Do not use sleeping pills or alcohol. They could harm your baby. Ease leg cramps  · Do not massage your calf during the cramp. · Sit on a firm bed or chair. Straighten your leg, and bend your foot (flex your ankle) slowly upward, toward your knee. Bend your toes up and down. · Stand on a cool, flat surface. Stretch your toes upward, and take small steps walking on your heels. · Use a heating pad or hot water bottle to help with muscle ache. Prevent leg cramps  · Be sure to get enough calcium.  If you are worried that you are not getting enough, talk to your doctor. · Exercise every day, and stretch your legs before bed. · Take a warm bath before bed, and try leg warmers at night. Where can you learn more? Go to https://sarah.healthOnTheList. org and sign in to your [employer] account. Enter A282 in the [facility] box to learn more about \"Weeks 18 to 22 of Your Pregnancy: Care Instructions. \"     If you do not have an account, please click on the \"Sign Up Now\" link. Current as of: June 16, 2021               Content Version: 13.1  © 9242-4833 [employer]. Care instructions adapted under license by [facility] ([facility]). If you have questions about a medical condition or this instruction, always ask your healthcare professional. [place] [address] any warranty or liability for your use of this information. Patient Education        Learning About When to Call Your Doctor During Pregnancy (Up to 20 Weeks)  Overview     It's common to have concerns about what might be a problem during your pregnancy. Most pregnancies don't have any serious problems. But it's still important to know when to call your doctor if you have certain symptoms. These are general suggestions. Your doctor may give you some more information about when to call. When to call your doctor (up to 20 weeks)  Call 911 anytime you think you may need emergency care. For example, call if:  · You passed out (lost consciousness). Call your doctor now or seek immediate medical care if:  · You have a fever. · You have vaginal bleeding. · You are dizzy or lightheaded, or you feel like you may faint. · You have symptoms of a urinary tract infection. These may include:  ? Pain or burning when you urinate. ? A frequent need to urinate without being able to pass much urine. ? Pain in the flank, which is just below the rib cage and above the waist on either side of the back. ?  Blood in your urine.  · You have belly pain. · You think you are having contractions. · You have a sudden release of fluid from your vagina. Watch closely for changes in your health, and be sure to contact your doctor if:  · You have vaginal discharge that smells bad. · You have other concerns about your pregnancy. Follow-up care is a key part of your treatment and safety. Be sure to make and go to all appointments, and call your doctor if you are having problems. It's also a good idea to know your test results and keep a list of the medicines you take. Where can you learn more? Go to https://chpepiceweb.healthIvaldi. org and sign in to your [employer] account. Enter E313 in the [employer] box to learn more about \"Learning About When to Call Your Doctor During Pregnancy (Up to 20 Weeks). \"     If you do not have an account, please click on the \"Sign Up Now\" link. Current as of: June 16, 2021               Content Version: 13.1  © 2006-2021 [employer]. Care instructions adapted under license by [facility] ([facility]). If you have questions about a medical condition or this instruction, always ask your healthcare professional. [place] [address] any warranty or liability for your use of this information.

## 2022-06-01 ENCOUNTER — HOSPITAL ENCOUNTER (OUTPATIENT)
Age: 22
Setting detail: OBSERVATION
Discharge: HOME | End: 2022-06-01
Attending: OBSTETRICS & GYNECOLOGY | Admitting: OBSTETRICS & GYNECOLOGY
Payer: COMMERCIAL

## 2022-06-01 VITALS
HEART RATE: 111 BPM | WEIGHT: 225 LBS | RESPIRATION RATE: 18 BRPM | HEIGHT: 66 IN | OXYGEN SATURATION: 100 % | DIASTOLIC BLOOD PRESSURE: 51 MMHG | TEMPERATURE: 98.3 F | BODY MASS INDEX: 36.16 KG/M2 | SYSTOLIC BLOOD PRESSURE: 96 MMHG

## 2022-06-01 PROBLEM — R10.9 ABDOMINAL PAIN: Status: ACTIVE | Noted: 2022-06-01

## 2022-06-01 LAB
APTT: 28.2 SEC (ref 24.5–35.1)
BACTERIA: ABNORMAL /HPF
BASOPHILS ABSOLUTE: 0.06 E9/L (ref 0–0.2)
BASOPHILS RELATIVE PERCENT: 0.4 % (ref 0–2)
BILIRUBIN URINE: NEGATIVE
BLOOD, URINE: ABNORMAL
CLARITY: CLEAR
COLOR: YELLOW
D DIMER: 308 NG/ML DDU
EOSINOPHILS ABSOLUTE: 0.09 E9/L (ref 0.05–0.5)
EOSINOPHILS RELATIVE PERCENT: 0.6 % (ref 0–6)
EPITHELIAL CELLS, UA: ABNORMAL /HPF
FIBRINOGEN: 597 MG/DL (ref 200–400)
GLUCOSE URINE: NEGATIVE MG/DL
HCT VFR BLD CALC: 32.1 % (ref 34–48)
HEMOGLOBIN: 10.4 G/DL (ref 11.5–15.5)
IMMATURE GRANULOCYTES #: 0.13 E9/L
IMMATURE GRANULOCYTES %: 0.9 % (ref 0–5)
INR BLD: 1
KETONES, URINE: 15 MG/DL
LEUKOCYTE ESTERASE, URINE: NEGATIVE
LYMPHOCYTES ABSOLUTE: 2.83 E9/L (ref 1.5–4)
LYMPHOCYTES RELATIVE PERCENT: 20.1 % (ref 20–42)
MCH RBC QN AUTO: 28.5 PG (ref 26–35)
MCHC RBC AUTO-ENTMCNC: 32.4 % (ref 32–34.5)
MCV RBC AUTO: 87.9 FL (ref 80–99.9)
MONOCYTES ABSOLUTE: 0.97 E9/L (ref 0.1–0.95)
MONOCYTES RELATIVE PERCENT: 6.9 % (ref 2–12)
NEUTROPHILS ABSOLUTE: 9.97 E9/L (ref 1.8–7.3)
NEUTROPHILS RELATIVE PERCENT: 71.1 % (ref 43–80)
NITRITE, URINE: NEGATIVE
PDW BLD-RTO: 13.5 FL (ref 11.5–15)
PH UA: 6.5 (ref 5–9)
PLATELET # BLD: 307 E9/L (ref 130–450)
PMV BLD AUTO: 11 FL (ref 7–12)
PROTEIN UA: ABNORMAL MG/DL
PROTHROMBIN TIME: 11.2 SEC (ref 9.3–12.4)
RBC # BLD: 3.65 E12/L (ref 3.5–5.5)
RBC UA: ABNORMAL /HPF (ref 0–2)
SPECIFIC GRAVITY UA: 1.01 (ref 1–1.03)
UROBILINOGEN, URINE: 0.2 E.U./DL
WBC # BLD: 14.1 E9/L (ref 4.5–11.5)
WBC UA: ABNORMAL /HPF (ref 0–5)

## 2022-06-01 PROCEDURE — G0378 HOSPITAL OBSERVATION PER HR: HCPCS

## 2022-06-01 PROCEDURE — 81001 URINALYSIS AUTO W/SCOPE: CPT

## 2022-06-01 PROCEDURE — 85378 FIBRIN DEGRADE SEMIQUANT: CPT

## 2022-06-01 PROCEDURE — 85730 THROMBOPLASTIN TIME PARTIAL: CPT

## 2022-06-01 PROCEDURE — 99202 OFFICE O/P NEW SF 15 MIN: CPT

## 2022-06-01 PROCEDURE — 36415 COLL VENOUS BLD VENIPUNCTURE: CPT

## 2022-06-01 PROCEDURE — 85610 PROTHROMBIN TIME: CPT

## 2022-06-01 PROCEDURE — 85384 FIBRINOGEN ACTIVITY: CPT

## 2022-06-01 PROCEDURE — 85025 COMPLETE CBC W/AUTO DIFF WBC: CPT

## 2022-06-01 RX ORDER — PRENATAL 105/IRON/FOLIC AC/DHA 30-1.4-3
COMBINATION PACKAGE (EA) ORAL
COMMUNITY
End: 2022-07-24

## 2022-06-01 NOTE — PROGRESS NOTES
Spoke with Dr Sayra collins to discharge home with followup this week. Discharge instructions given to patient.

## 2022-06-01 NOTE — PROGRESS NOTES
Labs reported to Dr Abiola Gallagher- wants a call back after next time patient up to void to check bleeding. Pt up to bathroom- very small amount of spotting on paper when wiping. Will notify Dr Abiola Gallagher.

## 2022-06-01 NOTE — H&P
Department of Obstetrics and Gynecology   Obstetrics History and Physical      Manual Pari  2022  [id number]    CHIEF COMPLAINT:  Vaginal bleeding    HISTORY OF PRESENT ILLNESS:      The patient is a 24 y.o. female Aminah Black at 28w1d. came to the labor and delivery with vaginal spotting and bleeding as patient had a sexual relation this morning and thereafter it started patient has some abdominal pain but now it has subsided. Patient notices vaginal bleeding while wiping when she goes to the bathroom. OB History        2    Para   1    Term   0       1    AB        Living   1       SAB        IAB        Ectopic        Molar        Multiple   0    Live Births   1            Patient presents with a chief complaint as above and is being admitted for evaluation and management, her prenatal ultrasound was reviewed and the placenta is situated anteriorly. Estimated Due Date: Estimated Date of Delivery: 22    PRENATAL CARE:    Complicated by:   Patient Active Problem List   Diagnosis Code    Delivery by  section for breech presentation O32. 1XX0    Depressive disorder F32.9    H/O fetal anomaly in prior pregnancy, currently pregnant O09.299    14 weeks gestation of pregnancy Z3A.14    H/O: substance abuse ([facility] [address] F19.11    Obesity, Class II, BMI 35-39.9 E66.9    Abdominal pain R10.9       PAST OB HISTORY  OB History        2    Para   1    Term   0       1    AB        Living   1       SAB        IAB        Ectopic        Molar        Multiple   0    Live Births   1                Past Medical History:        Diagnosis Date    43 weeks gestation of pregnancy 2021    Depression     depression and anxiety    DM (diabetes mellitus), gestational     First pregnancy    Postpartum depression     Two vessel umbilical cord      Past Surgical History:        Procedure Laterality Date     SECTION N/A 2021     SECTION performed by Nayely RODRIGUES Laura Beaver MD at [facility]     Allergies:  Pineapple  Social History:    Social History     Socioeconomic History    Marital status: Single     Spouse name: Not on file    Number of children: Not on file    Years of education: Not on file    Highest education level: Not on file   Occupational History    Not on file   Tobacco Use    Smoking status: Former Smoker     Types: Cigarettes     Quit date: 2020     Years since quittin.6    Smokeless tobacco: Never Used    Tobacco comment: quit dec 24 2021   Vaping Use    Vaping Use: Former    Quit date: 2021    Substances: Nicotine, CBD, quit dec 24 2021    Passive vaping exposure: Yes   Substance and Sexual Activity    Alcohol use: Not Currently    Drug use: Not Currently     Types: Marijuana Fronie Sushila)     Comment: last time used dec 7 2021    Sexual activity: Not on file   Other Topics Concern    Not on file   Social History Narrative    Not on file     Social Determinants of Health     Financial Resource Strain:     Difficulty of Paying Living Expenses: Not on file   Food Insecurity:     Worried About [address] in the Last Year: Not on file    [address] in the Last Year: Not on file   Transportation Needs:     Lack of Transportation (Medical): Not on file    Lack of Transportation (Non-Medical):  Not on file   Physical Activity:     Days of Exercise per Week: Not on file    Minutes of Exercise per Session: Not on file   Stress:     Feeling of Stress : Not on file   Social Connections:     Frequency of Communication with Friends and Family: Not on file    Frequency of Social Gatherings with Friends and Family: Not on file    Attends Scientologist Services: Not on file    Active Member of Clubs or Organizations: Not on file    Attends Club or Organization Meetings: Not on file    Marital Status: Not on file   Intimate Partner Violence:     Fear of Current or Ex-Partner: Not on file    Emotionally Abused: Not on file  Physically Abused: Not on file    Sexually Abused: Not on file   Housing Stability:     Unable to Pay for Housing in the Last Year: Not on file    Number of Places Lived in the Last Year: Not on file    Unstable Housing in the Last Year: Not on file     Family History:   No family history on file. Medications Prior to Admission:  Medications Prior to Admission: Lidvkw-SoPfok-FQ-DHA w/o Vit A (PRENA 1 TRUE) 30-1.4 & 300 MG MISC, Take by mouth  potassium chloride (MICRO-K) 10 MEQ extended release capsule, TAKE ONE CAPSULE BY MOUTH DAILY  Prenatal MV-Min-Fe Fum-FA-DHA (PRENATAL 1 PO), Take by mouth  ondansetron (ZOFRAN ODT) 4 MG disintegrating tablet, Take 1 tablet by mouth every 12 hours as needed for Nausea or Vomiting    REVIEW OF SYSTEMS:    CONSTITUTIONAL:  negative  RESPIRATORY:  negative  CARDIOVASCULAR:  negative  GASTROINTESTINAL:  Negative  GENITOURINARY: Negative  ALLERGIC/IMMUNOLOGIC:  negative  NEUROLOGICAL:  negative  BEHAVIOR/PSYCH:  negative    PHYSICAL EXAM:  Blood pressure (!) 96/51, pulse (!) 111, temperature 98.3 °F (36.8 °C), temperature source Oral, resp. rate 18, height 5' 6\" (1.676 m), weight 225 lb (102.1 kg), last menstrual period 09/15/2021, SpO2 100 %, unknown if currently breastfeeding. General appearance:  awake, alert, cooperative, no apparent distress, and appears stated age  Abdomen:  Gravid  uterus, consistent with her gestational age 28w1d, no  Uterine contractions. , CVA tenderness No      Fetal heart rate:  Reassuring.  140,with accels and moderate variability,no decels,  Pelvis:  Adequate pelvis  Slight vaginal spotting on wiping but no active bleeding PerRN  Extremities: nontender bilaterally,edema1+    DATA:  Labs:  CBC:   Lab Results   Component Value Date    WBC 14.1 06/01/2022    RBC 3.65 06/01/2022    HGB 10.4 06/01/2022    HCT 32.1 06/01/2022    MCV 87.9 06/01/2022    RDW 13.5 06/01/2022     06/01/2022     CMP:    Lab Results   Component Value Date     02/25/2021    K 4.4 02/25/2021     02/25/2021    CO2 19 02/25/2021    BUN 7 02/25/2021    PROT 7.2 12/05/2020     U/A:  No components found for: [place], USPGRAV, UPH, UPROTEIN, UGLUCOSE, UKETONE, UBILI, UBLOOD, UNITRITE, UUROBIL, ULEUKEST, USQEPI, URENEPI, UWBC, [place], [place], [place]  TSH:  No results found for: TSH  RPR:  No results found for: RPR  RUBELLA: No results found for: RUBELLAIGG  HEPBSAG: No results found for: HBSAGI  HIV:  No results found for: HIV  HgBA1c:  No components found for: HGBA1C  Blood Type:  No results found for: ABOINT  RH:  No results found for: ANATITER, C3, C4, RF  Antibody Screen:  No components found for: ABSCINT  Gonorrhea:  No components found for: PRBCHLGC  Chlamydia:  No components found for: CCHLAM  gbs-STREP B SCREEN: No results found for: GBSAG    No orders to display       Results for orders placed or performed during the hospital encounter of 06/01/22   CBC with Auto Differential   Result Value Ref Range    WBC 14.1 (H) 4.5 - 11.5 E9/L    RBC 3.65 3.50 - 5.50 E12/L    Hemoglobin 10.4 (L) 11.5 - 15.5 g/dL    Hematocrit 32.1 (L) 34.0 - 48.0 %    MCV 87.9 80.0 - 99.9 fL    MCH 28.5 26.0 - 35.0 pg    MCHC 32.4 32.0 - 34.5 %    RDW 13.5 11.5 - 15.0 fL    Platelets 810 503 - 065 E9/L    MPV 11.0 7.0 - 12.0 fL    Neutrophils % 71.1 43.0 - 80.0 %    Immature Granulocytes % 0.9 0.0 - 5.0 %    Lymphocytes % 20.1 20.0 - 42.0 %    Monocytes % 6.9 2.0 - 12.0 %    Eosinophils % 0.6 0.0 - 6.0 %    Basophils % 0.4 0.0 - 2.0 %    Neutrophils Absolute 9.97 (H) 1.80 - 7.30 E9/L    Immature Granulocytes # 0.13 E9/L    Lymphocytes Absolute 2.83 1.50 - 4.00 E9/L    Monocytes Absolute 0.97 (H) 0.10 - 0.95 E9/L    Eosinophils Absolute 0.09 0.05 - 0.50 E9/L    Basophils Absolute 0.06 0.00 - 0.20 E9/L   D-Dimer, Quantitative   Result Value Ref Range    D-Dimer, Quant 308 ng/mL DDU   Fibrinogen   Result Value Ref Range    Fibrinogen 597 (H) 200 - 400 mg/dL   Protime-INR   Result Value Ref Range    Protime 11.2 9.3 - 12.4 sec    INR 1.0    APTT   Result Value Ref Range    aPTT 28.2 24.5 - 35.1 sec   Urinalysis with Microscopic   Result Value Ref Range    Color, UA Yellow Straw/Yellow    Clarity, UA Clear Clear    Glucose, Ur Negative Negative mg/dL    Bilirubin Urine Negative Negative    Ketones, Urine 15 (A) Negative mg/dL    Specific Gravity, UA 1.015 1.005 - 1.030    Blood, Urine LARGE (A) Negative    pH, UA 6.5 5.0 - 9.0    Protein, UA TRACE Negative mg/dL    Urobilinogen, Urine 0.2 <2.0 E.U./dL    Nitrite, Urine Negative Negative    Leukocyte Esterase, Urine Negative Negative       ASSESSMENT AND PLAN:32 week4 days pregnancy,      Vaginal spotting    Principal Problem:    Abdominal pain  Resolved Problems:    * No resolved hospital problems. *    Patient is advised to have pelvic rest and hydrate herself and reassess for any extra bleeding and also review the lab reports.   Labor: OBSERVATION, , routine labor orders  Fetus: Reassuring  GBS:   IVFluids,labs,  If everything stable and no more extra heavier bleeding or spotting and labs are within normal limits, may consider discharging her home with instructions        Electronically signed by Gabbie England MD on 6/1/2022 at 3:09 PM

## 2022-06-01 NOTE — PROGRESS NOTES
Pt arrived to floor with c/o cramping and spotting. Pt placed on fetal monitor and toco- fetal movement noted on monitor and from patient. Spoke with Dr Meagan Hill orders given to send labs and urine and to monitor patient.

## 2022-06-03 ENCOUNTER — APPOINTMENT (OUTPATIENT)
Dept: GENERAL RADIOLOGY | Age: 22
End: 2022-06-03
Payer: COMMERCIAL

## 2022-06-03 ENCOUNTER — HOSPITAL ENCOUNTER (EMERGENCY)
Age: 22
Discharge: ANOTHER HOSPITAL | End: 2022-06-03
Attending: STUDENT IN AN ORGANIZED HEALTH CARE EDUCATION/TRAINING PROGRAM
Payer: COMMERCIAL

## 2022-06-03 ENCOUNTER — TELEPHONE (OUTPATIENT)
Dept: ORTHOPEDIC SURGERY | Age: 22
End: 2022-06-03

## 2022-06-03 VITALS
SYSTOLIC BLOOD PRESSURE: 124 MMHG | OXYGEN SATURATION: 97 % | TEMPERATURE: 98.1 F | WEIGHT: 223 LBS | HEART RATE: 106 BPM | BODY MASS INDEX: 35.84 KG/M2 | HEIGHT: 66 IN | DIASTOLIC BLOOD PRESSURE: 88 MMHG | RESPIRATION RATE: 22 BRPM

## 2022-06-03 DIAGNOSIS — S91.131A: Primary | ICD-10-CM

## 2022-06-03 PROBLEM — O9A.213 TRAUMATIC INJURY DURING PREGNANCY IN THIRD TRIMESTER: Status: ACTIVE | Noted: 2022-06-03

## 2022-06-03 LAB
ABO/RH: NORMAL
ACETAMINOPHEN LEVEL: <5 MCG/ML (ref 10–30)
ALBUMIN SERPL-MCNC: 3.9 G/DL (ref 3.5–5.2)
ALP BLD-CCNC: 124 U/L (ref 35–104)
ALT SERPL-CCNC: 27 U/L (ref 0–32)
AMPHETAMINE SCREEN, URINE: POSITIVE
ANGLE (CLOT STRENGTH): 78.4 DEGREE (ref 59–74)
ANION GAP SERPL CALCULATED.3IONS-SCNC: 13 MMOL/L (ref 7–16)
ANTIBODY SCREEN: NORMAL
APTT: 28.7 SEC (ref 24.5–35.1)
AST SERPL-CCNC: 20 U/L (ref 0–31)
B.E.: -4.5 MMOL/L (ref -3–3)
BARBITURATE SCREEN URINE: NOT DETECTED
BENZODIAZEPINE SCREEN, URINE: NOT DETECTED
BILIRUB SERPL-MCNC: 0.2 MG/DL (ref 0–1.2)
BUN BLDV-MCNC: 5 MG/DL (ref 6–20)
CALCIUM SERPL-MCNC: 9.5 MG/DL (ref 8.6–10.2)
CANNABINOID SCREEN URINE: NOT DETECTED
CHLORIDE BLD-SCNC: 103 MMOL/L (ref 98–107)
CO2: 19 MMOL/L (ref 22–29)
COCAINE METABOLITE SCREEN URINE: POSITIVE
COHB: 0.2 % (ref 0–1.5)
COMMENT: ABNORMAL
CREAT SERPL-MCNC: 0.5 MG/DL (ref 0.5–1)
CRITICAL: ABNORMAL
DATE ANALYZED: ABNORMAL
DATE OF COLLECTION: ABNORMAL
EPL-TEG: 0 % (ref 0–15)
ETHANOL: <10 MG/DL (ref 0–0.08)
FENTANYL SCREEN, URINE: POSITIVE
G-TEG: 19.8 K D/SC (ref 4.5–11)
GFR AFRICAN AMERICAN: >60
GFR NON-AFRICAN AMERICAN: >60 ML/MIN/1.73
GLUCOSE BLD-MCNC: 113 MG/DL (ref 74–99)
HCG QUALITATIVE: POSITIVE
HCO3: 19.6 MMOL/L (ref 22–26)
HCT VFR BLD CALC: 31.9 % (ref 34–48)
HEMOGLOBIN: 10.5 G/DL (ref 11.5–15.5)
HHB: 1 % (ref 0–5)
INR BLD: 1
K (CLOTTING TIME): 0.8 MIN (ref 1–3)
LAB: ABNORMAL
LACTIC ACID: 1.7 MMOL/L (ref 0.5–2.2)
LY30 (FIBRINOLYSIS): 0 % (ref 0–8)
Lab: ABNORMAL
Lab: ABNORMAL
MA (MAX AMPLITUDE): 79.8 MM (ref 50–70)
MCH RBC QN AUTO: 28.6 PG (ref 26–35)
MCHC RBC AUTO-ENTMCNC: 32.9 % (ref 32–34.5)
MCV RBC AUTO: 86.9 FL (ref 80–99.9)
METHADONE SCREEN, URINE: NOT DETECTED
METHB: 0.3 % (ref 0–1.5)
MODE: ABNORMAL
O2 SATURATION: 99 % (ref 92–98.5)
O2HB: 98.5 % (ref 94–97)
OPERATOR ID: 1721
OPIATE SCREEN URINE: NOT DETECTED
OXYCODONE URINE: NOT DETECTED
PATIENT TEMP: 37 C
PCO2: 32.9 MMHG (ref 35–45)
PDW BLD-RTO: 13.5 FL (ref 11.5–15)
PH BLOOD GAS: 7.39 (ref 7.35–7.45)
PHENCYCLIDINE SCREEN URINE: NOT DETECTED
PLATELET # BLD: 320 E9/L (ref 130–450)
PMV BLD AUTO: 11.1 FL (ref 7–12)
PO2: 294.6 MMHG (ref 75–100)
POTASSIUM SERPL-SCNC: 3.9 MMOL/L (ref 3.5–5)
POTASSIUM SERPL-SCNC: 3.94 MMOL/L (ref 3.5–5)
PROTHROMBIN TIME: 11.3 SEC (ref 9.3–12.4)
R (REACTION TIME): 4.9 MIN (ref 5–10)
RBC # BLD: 3.67 E12/L (ref 3.5–5.5)
SALICYLATE, SERUM: <0.3 MG/DL (ref 0–30)
SODIUM BLD-SCNC: 135 MMOL/L (ref 132–146)
SOURCE, BLOOD GAS: ABNORMAL
THB: 11.4 G/DL (ref 11.5–16.5)
TIME ANALYZED: 232
TOTAL PROTEIN: 7.2 G/DL (ref 6.4–8.3)
TRICYCLIC ANTIDEPRESSANTS SCREEN SERUM: NEGATIVE NG/ML
WBC # BLD: 15.9 E9/L (ref 4.5–11.5)

## 2022-06-03 PROCEDURE — 85027 COMPLETE CBC AUTOMATED: CPT

## 2022-06-03 PROCEDURE — 6360000002 HC RX W HCPCS

## 2022-06-03 PROCEDURE — 90714 TD VACC NO PRESV 7 YRS+ IM: CPT

## 2022-06-03 PROCEDURE — 59025 FETAL NON-STRESS TEST: CPT

## 2022-06-03 PROCEDURE — 80053 COMPREHEN METABOLIC PANEL: CPT

## 2022-06-03 PROCEDURE — 86850 RBC ANTIBODY SCREEN: CPT

## 2022-06-03 PROCEDURE — 84703 CHORIONIC GONADOTROPIN ASSAY: CPT

## 2022-06-03 PROCEDURE — 82805 BLOOD GASES W/O2 SATURATION: CPT

## 2022-06-03 PROCEDURE — 85347 COAGULATION TIME ACTIVATED: CPT

## 2022-06-03 PROCEDURE — 96365 THER/PROPH/DIAG IV INF INIT: CPT

## 2022-06-03 PROCEDURE — 2580000003 HC RX 258: Performed by: SURGERY

## 2022-06-03 PROCEDURE — 85730 THROMBOPLASTIN TIME PARTIAL: CPT

## 2022-06-03 PROCEDURE — 80143 DRUG ASSAY ACETAMINOPHEN: CPT

## 2022-06-03 PROCEDURE — 85384 FIBRINOGEN ACTIVITY: CPT

## 2022-06-03 PROCEDURE — 90471 IMMUNIZATION ADMIN: CPT

## 2022-06-03 PROCEDURE — 80179 DRUG ASSAY SALICYLATE: CPT

## 2022-06-03 PROCEDURE — 85576 BLOOD PLATELET AGGREGATION: CPT

## 2022-06-03 PROCEDURE — 2580000003 HC RX 258: Performed by: STUDENT IN AN ORGANIZED HEALTH CARE EDUCATION/TRAINING PROGRAM

## 2022-06-03 PROCEDURE — 6810039000 HC L1 TRAUMA ALERT

## 2022-06-03 PROCEDURE — 84132 ASSAY OF SERUM POTASSIUM: CPT

## 2022-06-03 PROCEDURE — 2580000003 HC RX 258: Performed by: EMERGENCY MEDICINE

## 2022-06-03 PROCEDURE — 6360000002 HC RX W HCPCS: Performed by: STUDENT IN AN ORGANIZED HEALTH CARE EDUCATION/TRAINING PROGRAM

## 2022-06-03 PROCEDURE — 72170 X-RAY EXAM OF PELVIS: CPT

## 2022-06-03 PROCEDURE — 96375 TX/PRO/DX INJ NEW DRUG ADDON: CPT

## 2022-06-03 PROCEDURE — 82077 ASSAY SPEC XCP UR&BREATH IA: CPT

## 2022-06-03 PROCEDURE — 71045 X-RAY EXAM CHEST 1 VIEW: CPT

## 2022-06-03 PROCEDURE — 83605 ASSAY OF LACTIC ACID: CPT

## 2022-06-03 PROCEDURE — 96372 THER/PROPH/DIAG INJ SC/IM: CPT

## 2022-06-03 PROCEDURE — 6360000002 HC RX W HCPCS: Performed by: EMERGENCY MEDICINE

## 2022-06-03 PROCEDURE — 85610 PROTHROMBIN TIME: CPT

## 2022-06-03 PROCEDURE — 86901 BLOOD TYPING SEROLOGIC RH(D): CPT

## 2022-06-03 PROCEDURE — 99285 EMERGENCY DEPT VISIT HI MDM: CPT

## 2022-06-03 PROCEDURE — 96376 TX/PRO/DX INJ SAME DRUG ADON: CPT

## 2022-06-03 PROCEDURE — 36415 COLL VENOUS BLD VENIPUNCTURE: CPT

## 2022-06-03 PROCEDURE — 86900 BLOOD TYPING SEROLOGIC ABO: CPT

## 2022-06-03 PROCEDURE — 80307 DRUG TEST PRSMV CHEM ANLYZR: CPT

## 2022-06-03 PROCEDURE — 73630 X-RAY EXAM OF FOOT: CPT

## 2022-06-03 PROCEDURE — 6360000002 HC RX W HCPCS: Performed by: SURGERY

## 2022-06-03 RX ORDER — FENTANYL CITRATE 50 UG/ML
25 INJECTION, SOLUTION INTRAMUSCULAR; INTRAVENOUS ONCE
Status: COMPLETED | OUTPATIENT
Start: 2022-06-03 | End: 2022-06-03

## 2022-06-03 RX ORDER — SODIUM CHLORIDE 9 MG/ML
INJECTION, SOLUTION INTRAVENOUS CONTINUOUS PRN
Status: COMPLETED | OUTPATIENT
Start: 2022-06-03 | End: 2022-06-03

## 2022-06-03 RX ORDER — FENTANYL CITRATE 50 UG/ML
INJECTION, SOLUTION INTRAMUSCULAR; INTRAVENOUS DAILY PRN
Status: COMPLETED | OUTPATIENT
Start: 2022-06-03 | End: 2022-06-03

## 2022-06-03 RX ORDER — TETANUS AND DIPHTHERIA TOXOIDS ADSORBED 2; 2 [LF]/.5ML; [LF]/.5ML
0.5 INJECTION INTRAMUSCULAR ONCE
Status: COMPLETED | OUTPATIENT
Start: 2022-06-03 | End: 2022-06-03

## 2022-06-03 RX ADMIN — CEFAZOLIN 2000 MG: 2 INJECTION, POWDER, FOR SOLUTION INTRAMUSCULAR; INTRAVENOUS at 05:41

## 2022-06-03 RX ADMIN — FENTANYL CITRATE 25 MCG: 50 INJECTION, SOLUTION INTRAMUSCULAR; INTRAVENOUS at 03:26

## 2022-06-03 RX ADMIN — FENTANYL CITRATE 50 MCG: 50 INJECTION, SOLUTION INTRAMUSCULAR; INTRAVENOUS at 02:23

## 2022-06-03 RX ADMIN — TETANUS AND DIPHTHERIA TOXOIDS ADSORBED 0.5 ML: 2; 2 INJECTION INTRAMUSCULAR at 07:50

## 2022-06-03 RX ADMIN — SODIUM CHLORIDE 1000 ML/HR: 9 INJECTION, SOLUTION INTRAVENOUS at 02:25

## 2022-06-03 RX ADMIN — PIPERACILLIN AND TAZOBACTAM 4500 MG: 4; .5 INJECTION, POWDER, LYOPHILIZED, FOR SOLUTION INTRAVENOUS at 08:59

## 2022-06-03 ASSESSMENT — PAIN SCALES - GENERAL: PAINLEVEL_OUTOF10: 10
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
## 2022-06-03 NOTE — TELEPHONE ENCOUNTER
Patient transferred to [facility] for elevation in care.   No appointment made with our office,

## 2022-06-03 NOTE — ED NOTES
Pt denies drug, alcohol and tobacco use   Denies allergies   Pt reports she is 8 months pregnant and has gestational diabetes      Herb Tolbert RN  06/03/22 6083
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
## 2022-06-03 NOTE — ED NOTES
Pt log rolled to left side no signs of trauma no steps offs no deformities no tenderness upon palpation      Andre Power RN  06/03/22 7112

## 2022-06-23 ENCOUNTER — HOSPITAL ENCOUNTER (OUTPATIENT)
Age: 22
Discharge: HOME | End: 2022-06-23
Attending: OBSTETRICS & GYNECOLOGY | Admitting: OBSTETRICS & GYNECOLOGY
Payer: COMMERCIAL

## 2022-06-23 VITALS
RESPIRATION RATE: 16 BRPM | HEART RATE: 110 BPM | TEMPERATURE: 97.9 F | DIASTOLIC BLOOD PRESSURE: 72 MMHG | SYSTOLIC BLOOD PRESSURE: 131 MMHG

## 2022-06-23 PROCEDURE — 59025 FETAL NON-STRESS TEST: CPT

## 2022-06-23 NOTE — PROGRESS NOTES
35w5d    Patient arrived to the unit for NST for GDM. Patient denies any leaking of fluid or blood. Perceives fetal movement. Placed on EFM.  Oriented to TR3 and call  Light in reach

## 2022-06-27 ENCOUNTER — APPOINTMENT (OUTPATIENT)
Dept: LABOR AND DELIVERY | Age: 22
End: 2022-06-27
Payer: COMMERCIAL

## 2022-06-27 ENCOUNTER — HOSPITAL ENCOUNTER (OUTPATIENT)
Age: 22
Discharge: HOME | End: 2022-06-27
Attending: OBSTETRICS & GYNECOLOGY | Admitting: OBSTETRICS & GYNECOLOGY
Payer: COMMERCIAL

## 2022-06-27 VITALS
TEMPERATURE: 97.6 F | RESPIRATION RATE: 16 BRPM | HEART RATE: 111 BPM | SYSTOLIC BLOOD PRESSURE: 127 MMHG | DIASTOLIC BLOOD PRESSURE: 73 MMHG

## 2022-06-27 PROCEDURE — 59025 FETAL NON-STRESS TEST: CPT

## 2022-06-27 NOTE — PROGRESS NOTES
Department of Obstetrics and Gynecology  Labor and Delivery         NST note. Indication:Active Problems:    * No active hospital problems. *  Resolved Problems:    * No resolved hospital problems.  *       gestational diabetes class A 1 and 36 weeks 2 days IUP      Vitals:  /73   Pulse (!) 111   Temp 97.6 °F (36.4 °C) (Oral)   Resp 16   LMP 09/15/2021     Fetal heart rate:         Baseline Heart Rate:  130        Accelerations:  Present       Decelerations: absent        Variability:  moderate    Contraction frequency: 0 minutes    Fetal Movements:yes    Assessment:reactive NST    Plan:Kick count twice a day    Keep the appointment as scheduled on thursday

## 2022-06-27 NOTE — PROGRESS NOTES
36w2d       Patient arrived to the unit for scheduled NST for GDM. Patient denies any leaking of fluid or blood. Perceives fetal movement. Placed on EFM. Oriented to LD4 and call light in reach.

## 2022-07-03 ENCOUNTER — HOSPITAL ENCOUNTER (OUTPATIENT)
Age: 22
Discharge: HOME | End: 2022-07-03
Attending: OBSTETRICS & GYNECOLOGY | Admitting: OBSTETRICS & GYNECOLOGY
Payer: COMMERCIAL

## 2022-07-03 ENCOUNTER — APPOINTMENT (OUTPATIENT)
Dept: LABOR AND DELIVERY | Age: 22
End: 2022-07-03
Payer: COMMERCIAL

## 2022-07-03 VITALS — DIASTOLIC BLOOD PRESSURE: 60 MMHG | HEART RATE: 117 BPM | SYSTOLIC BLOOD PRESSURE: 113 MMHG | TEMPERATURE: 97.6 F

## 2022-07-03 PROCEDURE — 59025 FETAL NON-STRESS TEST: CPT

## 2022-07-03 NOTE — PROGRESS NOTES
, 36 . Pt arrived for NST due to GDM. Pt states she perceivers fetal movement and denies an bleeding r leaking Pt states she feels occassional leanna cloud cx.

## 2022-07-07 ENCOUNTER — APPOINTMENT (OUTPATIENT)
Dept: LABOR AND DELIVERY | Age: 22
End: 2022-07-07
Payer: COMMERCIAL

## 2022-07-07 ENCOUNTER — HOSPITAL ENCOUNTER (OUTPATIENT)
Age: 22
Discharge: HOME | End: 2022-07-07
Attending: OBSTETRICS & GYNECOLOGY | Admitting: OBSTETRICS & GYNECOLOGY
Payer: COMMERCIAL

## 2022-07-07 VITALS
SYSTOLIC BLOOD PRESSURE: 133 MMHG | HEART RATE: 101 BPM | TEMPERATURE: 98.1 F | RESPIRATION RATE: 16 BRPM | DIASTOLIC BLOOD PRESSURE: 77 MMHG

## 2022-07-07 PROCEDURE — 59025 FETAL NON-STRESS TEST: CPT

## 2022-07-07 NOTE — PROGRESS NOTES
GP:3/1    EDC/WEEKS: 7/23/22 37w5d    Here for NST today for gestational diabetes    Patient placed on EFM where audible fetal movement noted by RN and positively perceived by patient. She denies LOF/Vaginal bleeding and reports good health. Patient understanding of testing, call light given.

## 2022-07-07 NOTE — PROGRESS NOTES
Dr. Rob Blake reviewed [facility] and was given patient status update. Patient ok for discharge.

## 2022-07-07 NOTE — PROGRESS NOTES
Department of Obstetrics and Gynecology  Labor and Delivery         NST note. Indication:Active Problems:    * No active hospital problems. *  Resolved Problems:    * No resolved hospital problems.  *       gestational diabetes class A 1,37 weeks 5 days IUP      Vitals:  /77   Pulse (!) 101   Temp 98.1 °F (36.7 °C) (Oral)   Resp 16   LMP 09/15/2021     Fetal heart rate:         Baseline Heart Rate:  140s        Accelerations:  Present       Decelerations: absent        Variability:  moderate    Contraction frequency: 0 minutes    Fetal Movements:yes    Assessment:reactive NST    Plan:Kick count twice a day    Keep the appointment as scheduled

## 2022-07-08 ENCOUNTER — HOSPITAL ENCOUNTER (OUTPATIENT)
Dept: PSYCHIATRY | Age: 22
Setting detail: THERAPIES SERIES
Discharge: HOME | End: 2022-07-08
Payer: COMMERCIAL

## 2022-07-08 PROCEDURE — 80305 DRUG TEST PRSMV DIR OPT OBS: CPT

## 2022-07-08 PROCEDURE — 90791 PSYCH DIAGNOSTIC EVALUATION: CPT

## 2022-07-08 ASSESSMENT — ANXIETY QUESTIONNAIRES
IF YOU CHECKED OFF ANY PROBLEMS ON THIS QUESTIONNAIRE, HOW DIFFICULT HAVE THESE PROBLEMS MADE IT FOR YOU TO DO YOUR WORK, TAKE CARE OF THINGS AT HOME, OR GET ALONG WITH OTHER PEOPLE: VERY DIFFICULT
6. BECOMING EASILY ANNOYED OR IRRITABLE: 0
GAD7 TOTAL SCORE: 10
1. FEELING NERVOUS, ANXIOUS, OR ON EDGE: 1
4. TROUBLE RELAXING: 1
7. FEELING AFRAID AS IF SOMETHING AWFUL MIGHT HAPPEN: 3
5. BEING SO RESTLESS THAT IT IS HARD TO SIT STILL: 1
2. NOT BEING ABLE TO STOP OR CONTROL WORRYING: 2
3. WORRYING TOO MUCH ABOUT DIFFERENT THINGS: 2

## 2022-07-08 ASSESSMENT — LIFESTYLE VARIABLES
HOW MANY STANDARD DRINKS CONTAINING ALCOHOL DO YOU HAVE ON A TYPICAL DAY: 5 OR 6
HOW OFTEN DO YOU HAVE A DRINK CONTAINING ALCOHOL: NEVER

## 2022-07-08 ASSESSMENT — PATIENT HEALTH QUESTIONNAIRE - PHQ9: SUM OF ALL RESPONSES TO PHQ QUESTIONS 1-9: 10
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
## 2022-07-08 NOTE — PLAN OF CARE
[facility]- Level of Care Placement      [x]Admissions  []Continued Stay []Discharge/Transfer / Complication in [place] LLBQ:9/6/1939          Level of Care Level 1      Outpatient Services Level 2.1   Intensive Outpatient Services(IOP) Level 2.5   Partial Hospitalization Services Level 3.1 CLINICALLY Managed Low-Intensity Residential Services Level 3.3  CLINICALLY Managed Population- Specific High- Intensity Residential Services Level 3.5  CLINICALLY Managed High Intensive Residential Services Level 3.7  MEDICALLY Monitored Intensive Inpatient Services Level 4  MEDICALLY Managed Intensive Inpatient Services   Dimension 1  Acute Intoxication and/or Withdrawal Potential [x] Not experiencing significant withdrawal    [] Minimal  risk of severe  withdrawal [] Minimal  risk of severe  withdrawal    [] Manageable  at Level 2-WM [] Moderate  risk of severe withdrawal    [] Manageable at Level 2-WM [] No withdrawal risk or minimal or stable withdrawal     [] Concurrently receiving Level -WM or Level 2-WM services [] Minimal risk of severe withdrawal    [] If withdrawal is present, manageable at Level 3. 2-WM  [] Minimal risk of severe withdrawal    [] If withdrawal is present manageable at Level 3. 2-WM [] High risk of withdrawal, but manageable at Level  3.7-WM and does not require  full resources of a licensed hospital [] At high risk of withdrawal and requires Level 4-WM and full resources of licensed hospital    COMMENTS:           Dimension 2   Biomedical Conditions and Complications  (BMC/C) [] None or very stable    [x] Receiving concurrent medical monitoring  [] None or not a distraction from treatment    [] Problems are manageable at Level 2.1 [] None or not sufficient to distract from treatment    [] Problems are manageable at  Level 2.5 [] None or stable    [] Receiving concurrent medical monitoring  [] None or stable    [] Receiving concurrent medical monitoring  [] None Care Level 1  Outpatient   Services Level 2.1  Intensive  Outpatient  Services Level 2.5  Partial Hospitalization Services Level 3.1  CLINICALLY Managed Low-intensity Residential Services Level 3.3  CLINICALLY Managed Population- Specific High- Intensity Residential Services Level 3.5  CLINICALLY Managed High-Intensive Residential Services Level 3.7  MEDICALLY Monitored Intensive Inpatient Services Level 4  MEDICALLY Managed Intensive Inpatient Services   Dimension 4  Readiness  To  Change [] Ready for recovery but needs motivating and monitoring strategies to strengthen readiness    []Needs ongoing monitoring and disease management    [] High severity in this dimension but not in other dimensions. Needs Level 1 motivational enhancement strategies   [x] Has variable engagement in treatment, ambivalence, or lack of awareness of substance use or mental health problems and requires structured program several times/wk. to promote progress through stages of change [] Has poor engagement in treatment, significant ambivalence, or lack of awareness of substance use or mental health problems, requires near daily structured program or intensive engagement to promote progress through stages of change [] Open to recovery, but needs structured environment to maintain therapeutic gains [] Has little awareness & needs interventions at Level 3.3 to engage & stay in treatment.     [] If there is high severity in this dimension, but not in any other dimension, motivational enhancement strategies should be provided in Level 1 [] Has marked difficulty with, or opposition to treatment with dangerous consequences    [] If there is high severity in this dimension, but not in any other dimension, motivational enhancement strategies should be provided in Level 1 [] Low interest in treatment and impulse control is poor despite negative consequences;  needs motivating strategies only safely available in 24-hour structured setting    [] If there is high severity in this dimension, but not in any other dimension, motivational enhancement strategies should be provided in Level 1 [] Problems in this dimension do not qualify the client for Level 4 services    [] If the client's only severity is in Dimension 4,5, and/or 6 without high severity in Dimensions 1,2, and/or 3, then client is not qualified for Level 4   COMMENTS:           Dimension 5  Relapse, Continued Use or Continued Problem Potential  [] Able to maintain abstinence or control use and/or addictive behaviors  and pursue recovery or motivational goals with minimal support [x] Intensification of addiction or mental health symptoms indicate high likelihood of relapse risk or continued use or continued problems without  close monitoring and support several times/wk.  [] Intensification of addiction or mental health symptoms, despite active participation in a Level 1 or 2.1 program, indicates high likelihood of relapse or continued use or continued problems without near-daily monitoring [] Understands relapse but needs structure to maintain therapeutic gains  [] Has little awareness and needs interventions available only at Level 3.3 to prevent continued use, with imminent dangerous consequences, because of cognitive deficits or  comparable dysfunction  [] Has no recognition  of the skills needed to prevent continued use,  with imminently dangerous  consequences [] Unable to control use, with imminently dangerous consequences,  despite active participation at less intensive levels of care [] Problems in this dimension do not qualify the client for Level 4 services    [] If the client's only severity is in Dimension 4,5, and/or 6 without high severity in Dimensions 1,2, and/or 3, then client is not qualified for Level 4   COMMENTS:           Dimension 6  Recovery/Living Environment []  Recovery environment is supportive and/or the client has skills to cope [x] Recovery environment is not supportive  but with structure and support, the client can cope [] Recovery environment is not supportive, but with structure and support and relief from the home environment, client can cope [] Environment    is dangerous, but recovery is achievable if Level 3.1 24-hour structure is available  [] Environment is dangerous and  client needs 24-hour structure to learn to cope [] Environment is dangerous, and the client lacks skills to cope outside of a  highly structured 24-hour setting   [] Environment is dangerous, and the client lacks skills to cope outside of a  highly structured 24-hour setting [] Problems in this dimension do not qualify the client for Level 4 services    [] If the client's only severity is in Dimension 4,5, and/or 6 without high severity in Dimensions 1,2, and/or 3, then client is not qualified for Level 4   COMMENTS:               Electronically signed by Kristyn Dupont Bibb [address] on 5/8/7040 at 11:23 AM

## 2022-07-11 ENCOUNTER — HOSPITAL ENCOUNTER (OUTPATIENT)
Dept: PSYCHIATRY | Age: 22
Setting detail: THERAPIES SERIES
Discharge: HOME | End: 2022-07-11
Payer: COMMERCIAL

## 2022-07-11 PROCEDURE — 90792 PSYCH DIAG EVAL W/MED SRVCS: CPT | Performed by: PSYCHIATRY & NEUROLOGY

## 2022-07-11 PROCEDURE — H0015 ALCOHOL AND/OR DRUG SERVICES: HCPCS

## 2022-07-11 NOTE — GROUP NOTE
Start Time: 1045 AM  End Time: 1200   Number of participants:5  Type of group: Recovery  Mode of intervention: Education, Support, Socialization, Exploration, Clarifying, Problem-solving, Media, Confrontation, Limit-setting, and Reality-testing  Topic: Sober support / Fellowship  Objective:  Explore and encourage client to work with other verus trying to stay sober alone. Note: Client actively participated in the group session. She was assigned to view education on developing and increasing sober support to aid continued sobriety. Client actively listened to the education and acknowledged that positive people can encourage being sober, however she did not report any personal experiences. Status after intervention:Unchanged  Participation level:  Active Listener  Participation Quality: Appropriate and Attentive  Speech: normal  Thought Process/Content:Logical  Mood/Affect: congruent  Self report: None Reported  Response to learning: Able to verbalize/acknowledge new learning  Discipline Responsible: [place]  Electronically signed by Mandy Briceño on 9/11/0888 at 5:06 PM
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
## 2022-07-11 NOTE — GROUP NOTE
Start Time: 900 AM  End Time: 56 AM   Number of participants:5  Type of group: Psychotherapy  Mode of intervention: Education, Support, Socialization, Exploration, Clarifying, Problem-solving, Confrontation, Limit-setting, and Reality-testing  Topic: Encouragement & Support  Objective: Identify the ways a support network can assist the client in his /her efforts at sobriety. Note: Client actively participated in the group session and she was introduced and oriented to the group process and provided information non client rights. She actively listened to the group members share and reported that she has minimal support and admitted to having cravings & urges to use subtances. In addition, client reported feeling sad because she knows that when she gives birth to her son he will be removed from her care because of her environment is not supportive to recovery. Counselor assisted client and linked her with peer support to assist with housing and going into a residential program and client received information but refused to accept the referral on this day and stated she will think about it. Status after intervention:Unchanged  Participation level: Active Listener, Interactive and Minimal  Participation Quality: Appropriate, Attentive and Sharing  Speech: hesitant  Thought Process/Content:Logical  Mood/Affect: sad  and tearful  Self report: None Reported  Response to learning: Able to verbalize current knowledge/experience, Able to verbalize/acknowledge new learning, Able to retain information and Capable of insight  Discipline Responsible: [place]  Electronically signed by Eugene Eddy on 0/77/5492 at 4:36 PM    [x] Check in completed and reviewed. Intervention required.  no
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
## 2022-07-12 ENCOUNTER — HOSPITAL ENCOUNTER (OUTPATIENT)
Dept: PSYCHIATRY | Age: 22
Setting detail: THERAPIES SERIES
Discharge: HOME | End: 2022-07-12
Payer: COMMERCIAL

## 2022-07-12 PROCEDURE — H0015 ALCOHOL AND/OR DRUG SERVICES: HCPCS

## 2022-07-12 NOTE — GROUP NOTE
Start Time: 10:00 AM   End Time: 12:00 PM  Number of participants:5  Type of group: Recovery  Mode of intervention: Education, Support, Socialization, Exploration, Clarifying, Problem-solving, Activity, Limit-setting, and Reality-testing  Topic: Recovery and Support  Objective:  Explore and provide awareness on support and encourage clients to not work a recovery program alone and to reach out for support.     Note: Client actively participated in the group session. Guest Peer Frank Harris presented his recovery journey to clients and with musical demonstration. Client actively listened and reported she could relate to the pain an loss of self. Client reported she wants to gain back her self-confidence that she can stay sober not return to drug use. Status after intervention:Improved  Participation level:  Active Listener and Interactive  Participation Quality: Appropriate, Attentive, Sharing and Supportive  Speech: normal  Thought Process/Content:Logical  Mood/Affect: brightens  Self report: None Reported  Response to learning: Able to verbalize current knowledge/experience, Able to verbalize/acknowledge new learning, Able to retain information, Capable of insight and Able to change behavior  Discipline Responsible: [place]    Electronically signed by Lorenzo Capps on 0/21/8802 at 1:44 PM
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
## 2022-07-14 ENCOUNTER — ANESTHESIA EVENT (OUTPATIENT)
Dept: LABOR AND DELIVERY | Age: 22
DRG: 540 | End: 2022-07-14
Payer: COMMERCIAL

## 2022-07-14 ENCOUNTER — HOSPITAL ENCOUNTER (INPATIENT)
Age: 22
LOS: 3 days | Discharge: HOME | DRG: 540 | End: 2022-07-17
Attending: OBSTETRICS & GYNECOLOGY | Admitting: OBSTETRICS & GYNECOLOGY
Payer: COMMERCIAL

## 2022-07-14 ENCOUNTER — ANESTHESIA (OUTPATIENT)
Dept: LABOR AND DELIVERY | Age: 22
DRG: 540 | End: 2022-07-14
Payer: COMMERCIAL

## 2022-07-14 DIAGNOSIS — Z34.90 TERM PREGNANCY: ICD-10-CM

## 2022-07-14 LAB
ABO/RH: NORMAL
ALBUMIN SERPL-MCNC: 3.2 G/DL (ref 3.5–5.2)
ALP BLD-CCNC: 189 U/L (ref 35–104)
ALT SERPL-CCNC: 9 U/L (ref 0–32)
AMPHETAMINE SCREEN, URINE: NOT DETECTED
ANION GAP SERPL CALCULATED.3IONS-SCNC: 12 MMOL/L (ref 7–16)
ANTIBODY SCREEN: NORMAL
AST SERPL-CCNC: 12 U/L (ref 0–31)
BARBITURATE SCREEN URINE: NOT DETECTED
BENZODIAZEPINE SCREEN, URINE: NOT DETECTED
BILIRUB SERPL-MCNC: <0.2 MG/DL (ref 0–1.2)
BUN BLDV-MCNC: 7 MG/DL (ref 6–20)
CALCIUM SERPL-MCNC: 8.6 MG/DL (ref 8.6–10.2)
CANNABINOID SCREEN URINE: NOT DETECTED
CHLORIDE BLD-SCNC: 105 MMOL/L (ref 98–107)
CO2: 18 MMOL/L (ref 22–29)
COCAINE METABOLITE SCREEN URINE: NOT DETECTED
CREAT SERPL-MCNC: 0.5 MG/DL (ref 0.5–1)
FENTANYL SCREEN, URINE: NOT DETECTED
GFR AFRICAN AMERICAN: >60
GFR NON-AFRICAN AMERICAN: >60 ML/MIN/1.73
GLUCOSE BLD-MCNC: 90 MG/DL (ref 74–99)
HCT VFR BLD CALC: 32.2 % (ref 34–48)
HEMOGLOBIN: 10.5 G/DL (ref 11.5–15.5)
Lab: NORMAL
MCH RBC QN AUTO: 28.9 PG (ref 26–35)
MCHC RBC AUTO-ENTMCNC: 32.6 % (ref 32–34.5)
MCV RBC AUTO: 88.7 FL (ref 80–99.9)
METHADONE SCREEN, URINE: NOT DETECTED
OPIATE SCREEN URINE: NOT DETECTED
OXYCODONE URINE: NOT DETECTED
PDW BLD-RTO: 13.9 FL (ref 11.5–15)
PHENCYCLIDINE SCREEN URINE: NOT DETECTED
PLATELET # BLD: 329 E9/L (ref 130–450)
PMV BLD AUTO: 11.4 FL (ref 7–12)
POTASSIUM SERPL-SCNC: 4.1 MMOL/L (ref 3.5–5)
RBC # BLD: 3.63 E12/L (ref 3.5–5.5)
SODIUM BLD-SCNC: 135 MMOL/L (ref 132–146)
TOTAL PROTEIN: 6.3 G/DL (ref 6.4–8.3)
WBC # BLD: 13.4 E9/L (ref 4.5–11.5)

## 2022-07-14 PROCEDURE — 3700000001 HC ADD 15 MINUTES (ANESTHESIA): Performed by: OBSTETRICS & GYNECOLOGY

## 2022-07-14 PROCEDURE — 6360000002 HC RX W HCPCS: Performed by: NURSE ANESTHETIST, CERTIFIED REGISTERED

## 2022-07-14 PROCEDURE — 2580000003 HC RX 258: Performed by: NURSE ANESTHETIST, CERTIFIED REGISTERED

## 2022-07-14 PROCEDURE — 3609079900 HC CESAREAN SECTION: Performed by: OBSTETRICS & GYNECOLOGY

## 2022-07-14 PROCEDURE — 7100000000 HC PACU RECOVERY - FIRST 15 MIN: Performed by: OBSTETRICS & GYNECOLOGY

## 2022-07-14 PROCEDURE — 6370000000 HC RX 637 (ALT 250 FOR IP): Performed by: ANESTHESIOLOGY

## 2022-07-14 PROCEDURE — 85027 COMPLETE CBC AUTOMATED: CPT

## 2022-07-14 PROCEDURE — 7100000001 HC PACU RECOVERY - ADDTL 15 MIN: Performed by: OBSTETRICS & GYNECOLOGY

## 2022-07-14 PROCEDURE — 86900 BLOOD TYPING SEROLOGIC ABO: CPT

## 2022-07-14 PROCEDURE — 86850 RBC ANTIBODY SCREEN: CPT

## 2022-07-14 PROCEDURE — 3700000000 HC ANESTHESIA ATTENDED CARE: Performed by: OBSTETRICS & GYNECOLOGY

## 2022-07-14 PROCEDURE — 86901 BLOOD TYPING SEROLOGIC RH(D): CPT

## 2022-07-14 PROCEDURE — 6360000002 HC RX W HCPCS: Performed by: OBSTETRICS & GYNECOLOGY

## 2022-07-14 PROCEDURE — 80053 COMPREHEN METABOLIC PANEL: CPT

## 2022-07-14 PROCEDURE — 2709999900 HC NON-CHARGEABLE SUPPLY: Performed by: OBSTETRICS & GYNECOLOGY

## 2022-07-14 PROCEDURE — 6370000000 HC RX 637 (ALT 250 FOR IP): Performed by: OBSTETRICS & GYNECOLOGY

## 2022-07-14 PROCEDURE — 36415 COLL VENOUS BLD VENIPUNCTURE: CPT

## 2022-07-14 PROCEDURE — 80307 DRUG TEST PRSMV CHEM ANLYZR: CPT

## 2022-07-14 PROCEDURE — 1220000001 HC SEMI PRIVATE L&D R&B

## 2022-07-14 PROCEDURE — 2580000003 HC RX 258: Performed by: OBSTETRICS & GYNECOLOGY

## 2022-07-14 PROCEDURE — 6360000002 HC RX W HCPCS: Performed by: ANESTHESIOLOGY

## 2022-07-14 PROCEDURE — 2500000003 HC RX 250 WO HCPCS: Performed by: NURSE ANESTHETIST, CERTIFIED REGISTERED

## 2022-07-14 RX ORDER — ONDANSETRON 2 MG/ML
4 INJECTION INTRAMUSCULAR; INTRAVENOUS EVERY 6 HOURS PRN
Status: DISCONTINUED | OUTPATIENT
Start: 2022-07-14 | End: 2022-07-17 | Stop reason: HOSPADM

## 2022-07-14 RX ORDER — SODIUM CHLORIDE, SODIUM LACTATE, POTASSIUM CHLORIDE, CALCIUM CHLORIDE 600; 310; 30; 20 MG/100ML; MG/100ML; MG/100ML; MG/100ML
INJECTION, SOLUTION INTRAVENOUS CONTINUOUS
Status: DISCONTINUED | OUTPATIENT
Start: 2022-07-14 | End: 2022-07-17 | Stop reason: HOSPADM

## 2022-07-14 RX ORDER — SODIUM CHLORIDE 0.9 % (FLUSH) 0.9 %
10 SYRINGE (ML) INJECTION EVERY 12 HOURS SCHEDULED
Status: DISCONTINUED | OUTPATIENT
Start: 2022-07-14 | End: 2022-07-17 | Stop reason: HOSPADM

## 2022-07-14 RX ORDER — SODIUM CHLORIDE, SODIUM LACTATE, POTASSIUM CHLORIDE, AND CALCIUM CHLORIDE .6; .31; .03; .02 G/100ML; G/100ML; G/100ML; G/100ML
1000 INJECTION, SOLUTION INTRAVENOUS ONCE
Status: COMPLETED | OUTPATIENT
Start: 2022-07-14 | End: 2022-07-14

## 2022-07-14 RX ORDER — MODIFIED LANOLIN
OINTMENT (GRAM) TOPICAL
Status: DISCONTINUED | OUTPATIENT
Start: 2022-07-14 | End: 2022-07-17 | Stop reason: HOSPADM

## 2022-07-14 RX ORDER — ONDANSETRON 2 MG/ML
INJECTION INTRAMUSCULAR; INTRAVENOUS PRN
Status: DISCONTINUED | OUTPATIENT
Start: 2022-07-14 | End: 2022-07-14 | Stop reason: SDUPTHER

## 2022-07-14 RX ORDER — DIPHENHYDRAMINE HYDROCHLORIDE 50 MG/ML
INJECTION INTRAMUSCULAR; INTRAVENOUS PRN
Status: DISCONTINUED | OUTPATIENT
Start: 2022-07-14 | End: 2022-07-14 | Stop reason: SDUPTHER

## 2022-07-14 RX ORDER — SODIUM CHLORIDE 0.9 % (FLUSH) 0.9 %
5-40 SYRINGE (ML) INJECTION EVERY 12 HOURS SCHEDULED
Status: DISCONTINUED | OUTPATIENT
Start: 2022-07-14 | End: 2022-07-17 | Stop reason: HOSPADM

## 2022-07-14 RX ORDER — PRENATAL WITH FERROUS FUM AND FOLIC ACID 3080; 920; 120; 400; 22; 1.84; 3; 20; 10; 1; 12; 200; 27; 25; 2 [IU]/1; [IU]/1; MG/1; [IU]/1; MG/1; MG/1; MG/1; MG/1; MG/1; MG/1; UG/1; MG/1; MG/1; MG/1; MG/1
1 TABLET ORAL DAILY
Status: DISCONTINUED | OUTPATIENT
Start: 2022-07-14 | End: 2022-07-17 | Stop reason: HOSPADM

## 2022-07-14 RX ORDER — SODIUM CHLORIDE, SODIUM LACTATE, POTASSIUM CHLORIDE, CALCIUM CHLORIDE 600; 310; 30; 20 MG/100ML; MG/100ML; MG/100ML; MG/100ML
INJECTION, SOLUTION INTRAVENOUS CONTINUOUS PRN
Status: DISCONTINUED | OUTPATIENT
Start: 2022-07-14 | End: 2022-07-14 | Stop reason: SDUPTHER

## 2022-07-14 RX ORDER — DIPHENHYDRAMINE HCL 25 MG
25 TABLET ORAL EVERY 6 HOURS PRN
Status: ACTIVE | OUTPATIENT
Start: 2022-07-14 | End: 2022-07-15

## 2022-07-14 RX ORDER — FERROUS SULFATE 325(65) MG
325 TABLET ORAL 2 TIMES DAILY WITH MEALS
Status: DISCONTINUED | OUTPATIENT
Start: 2022-07-14 | End: 2022-07-17 | Stop reason: HOSPADM

## 2022-07-14 RX ORDER — SODIUM CHLORIDE 9 MG/ML
INJECTION, SOLUTION INTRAVENOUS PRN
Status: DISCONTINUED | OUTPATIENT
Start: 2022-07-14 | End: 2022-07-17 | Stop reason: HOSPADM

## 2022-07-14 RX ORDER — SODIUM CHLORIDE 0.9 % (FLUSH) 0.9 %
5-40 SYRINGE (ML) INJECTION PRN
Status: DISCONTINUED | OUTPATIENT
Start: 2022-07-14 | End: 2022-07-17 | Stop reason: HOSPADM

## 2022-07-14 RX ORDER — OXYCODONE HYDROCHLORIDE 5 MG/1
10 TABLET ORAL EVERY 4 HOURS PRN
Status: DISCONTINUED | OUTPATIENT
Start: 2022-07-14 | End: 2022-07-14 | Stop reason: SDUPTHER

## 2022-07-14 RX ORDER — OXYCODONE HYDROCHLORIDE 5 MG/1
5 TABLET ORAL EVERY 4 HOURS PRN
Status: DISCONTINUED | OUTPATIENT
Start: 2022-07-14 | End: 2022-07-14 | Stop reason: SDUPTHER

## 2022-07-14 RX ORDER — NALOXONE HYDROCHLORIDE 0.4 MG/ML
INJECTION, SOLUTION INTRAMUSCULAR; INTRAVENOUS; SUBCUTANEOUS PRN
Status: ACTIVE | OUTPATIENT
Start: 2022-07-14 | End: 2022-07-15

## 2022-07-14 RX ORDER — TRISODIUM CITRATE DIHYDRATE AND CITRIC ACID MONOHYDRATE 500; 334 MG/5ML; MG/5ML
30 SOLUTION ORAL ONCE
Status: COMPLETED | OUTPATIENT
Start: 2022-07-14 | End: 2022-07-14

## 2022-07-14 RX ORDER — DEXAMETHASONE SODIUM PHOSPHATE 10 MG/ML
INJECTION, SOLUTION INTRAMUSCULAR; INTRAVENOUS PRN
Status: DISCONTINUED | OUTPATIENT
Start: 2022-07-14 | End: 2022-07-14 | Stop reason: SDUPTHER

## 2022-07-14 RX ORDER — MEPERIDINE HYDROCHLORIDE 25 MG/ML
12.5 INJECTION INTRAMUSCULAR; INTRAVENOUS; SUBCUTANEOUS EVERY 6 HOURS PRN
Status: ACTIVE | OUTPATIENT
Start: 2022-07-14 | End: 2022-07-15

## 2022-07-14 RX ORDER — IBUPROFEN 800 MG/1
800 TABLET ORAL EVERY 6 HOURS PRN
Status: DISCONTINUED | OUTPATIENT
Start: 2022-07-14 | End: 2022-07-17 | Stop reason: HOSPADM

## 2022-07-14 RX ORDER — BUPIVACAINE HYDROCHLORIDE 7.5 MG/ML
INJECTION, SOLUTION INTRASPINAL PRN
Status: DISCONTINUED | OUTPATIENT
Start: 2022-07-14 | End: 2022-07-14 | Stop reason: SDUPTHER

## 2022-07-14 RX ORDER — SODIUM CHLORIDE 0.9 % (FLUSH) 0.9 %
10 SYRINGE (ML) INJECTION PRN
Status: DISCONTINUED | OUTPATIENT
Start: 2022-07-14 | End: 2022-07-17 | Stop reason: HOSPADM

## 2022-07-14 RX ORDER — OXYCODONE HYDROCHLORIDE 5 MG/1
10 TABLET ORAL EVERY 4 HOURS PRN
Status: DISPENSED | OUTPATIENT
Start: 2022-07-14 | End: 2022-07-15

## 2022-07-14 RX ORDER — METHYLERGONOVINE MALEATE 0.2 MG/ML
200 INJECTION INTRAVENOUS PRN
Status: DISCONTINUED | OUTPATIENT
Start: 2022-07-14 | End: 2022-07-17 | Stop reason: HOSPADM

## 2022-07-14 RX ORDER — MORPHINE SULFATE 0.5 MG/ML
INJECTION, SOLUTION EPIDURAL; INTRATHECAL; INTRAVENOUS PRN
Status: DISCONTINUED | OUTPATIENT
Start: 2022-07-14 | End: 2022-07-14 | Stop reason: SDUPTHER

## 2022-07-14 RX ORDER — OXYCODONE HYDROCHLORIDE 5 MG/1
5 TABLET ORAL EVERY 4 HOURS PRN
Status: ACTIVE | OUTPATIENT
Start: 2022-07-14 | End: 2022-07-15

## 2022-07-14 RX ORDER — SIMETHICONE 80 MG
80 TABLET,CHEWABLE ORAL EVERY 6 HOURS PRN
Status: DISCONTINUED | OUTPATIENT
Start: 2022-07-14 | End: 2022-07-17 | Stop reason: HOSPADM

## 2022-07-14 RX ORDER — DIPHENHYDRAMINE HYDROCHLORIDE 50 MG/ML
25 INJECTION INTRAMUSCULAR; INTRAVENOUS EVERY 6 HOURS PRN
Status: DISPENSED | OUTPATIENT
Start: 2022-07-14 | End: 2022-07-15

## 2022-07-14 RX ORDER — DOCUSATE SODIUM 100 MG/1
100 CAPSULE, LIQUID FILLED ORAL DAILY
Status: DISCONTINUED | OUTPATIENT
Start: 2022-07-14 | End: 2022-07-17 | Stop reason: HOSPADM

## 2022-07-14 RX ORDER — PHENYLEPHRINE HYDROCHLORIDE 10 MG/ML
INJECTION INTRAVENOUS PRN
Status: DISCONTINUED | OUTPATIENT
Start: 2022-07-14 | End: 2022-07-14 | Stop reason: SDUPTHER

## 2022-07-14 RX ORDER — KETOROLAC TROMETHAMINE 30 MG/ML
30 INJECTION, SOLUTION INTRAMUSCULAR; INTRAVENOUS EVERY 6 HOURS
Status: DISPENSED | OUTPATIENT
Start: 2022-07-14 | End: 2022-07-15

## 2022-07-14 RX ORDER — GLYCOPYRROLATE 0.2 MG/ML
INJECTION INTRAMUSCULAR; INTRAVENOUS PRN
Status: DISCONTINUED | OUTPATIENT
Start: 2022-07-14 | End: 2022-07-14 | Stop reason: SDUPTHER

## 2022-07-14 RX ORDER — ACETAMINOPHEN 325 MG/1
650 TABLET ORAL EVERY 4 HOURS PRN
Status: ACTIVE | OUTPATIENT
Start: 2022-07-14 | End: 2022-07-15

## 2022-07-14 RX ADMIN — SODIUM CITRATE AND CITRIC ACID MONOHYDRATE 30 ML: 500; 334 SOLUTION ORAL at 06:15

## 2022-07-14 RX ADMIN — PHENYLEPHRINE HYDROCHLORIDE 100 MCG: 10 INJECTION INTRAVENOUS at 09:21

## 2022-07-14 RX ADMIN — SODIUM CHLORIDE, POTASSIUM CHLORIDE, SODIUM LACTATE AND CALCIUM CHLORIDE: 600; 310; 30; 20 INJECTION, SOLUTION INTRAVENOUS at 08:20

## 2022-07-14 RX ADMIN — SODIUM CHLORIDE, POTASSIUM CHLORIDE, SODIUM LACTATE AND CALCIUM CHLORIDE: 600; 310; 30; 20 INJECTION, SOLUTION INTRAVENOUS at 10:22

## 2022-07-14 RX ADMIN — SODIUM CHLORIDE, POTASSIUM CHLORIDE, SODIUM LACTATE AND CALCIUM CHLORIDE: 600; 310; 30; 20 INJECTION, SOLUTION INTRAVENOUS at 08:21

## 2022-07-14 RX ADMIN — PHENYLEPHRINE HYDROCHLORIDE 100 MCG: 10 INJECTION INTRAVENOUS at 08:33

## 2022-07-14 RX ADMIN — GLYCOPYRROLATE 0.2 MG: 0.2 INJECTION INTRAMUSCULAR; INTRAVENOUS at 08:34

## 2022-07-14 RX ADMIN — ONDANSETRON 4 MG: 2 INJECTION INTRAMUSCULAR; INTRAVENOUS at 08:53

## 2022-07-14 RX ADMIN — SODIUM CHLORIDE, POTASSIUM CHLORIDE, SODIUM LACTATE AND CALCIUM CHLORIDE: 600; 310; 30; 20 INJECTION, SOLUTION INTRAVENOUS at 07:54

## 2022-07-14 RX ADMIN — SODIUM CHLORIDE, POTASSIUM CHLORIDE, SODIUM LACTATE AND CALCIUM CHLORIDE 1000 ML: 600; 310; 30; 20 INJECTION, SOLUTION INTRAVENOUS at 06:16

## 2022-07-14 RX ADMIN — DEXAMETHASONE SODIUM PHOSPHATE 10 MG: 10 INJECTION, SOLUTION INTRAMUSCULAR; INTRAVENOUS at 08:57

## 2022-07-14 RX ADMIN — DIPHENHYDRAMINE HYDROCHLORIDE 50 MG: 50 INJECTION INTRAMUSCULAR; INTRAVENOUS at 08:57

## 2022-07-14 RX ADMIN — MORPHINE SULFATE 0.15 MG: 0.5 INJECTION, SOLUTION EPIDURAL; INTRATHECAL; INTRAVENOUS at 08:29

## 2022-07-14 RX ADMIN — OXYCODONE 10 MG: 5 TABLET ORAL at 16:30

## 2022-07-14 RX ADMIN — MORPHINE SULFATE 4.85 MG: 0.5 INJECTION, SOLUTION EPIDURAL; INTRATHECAL; INTRAVENOUS at 08:57

## 2022-07-14 RX ADMIN — CEFAZOLIN 2000 MG: 2 INJECTION, POWDER, FOR SOLUTION INTRAMUSCULAR; INTRAVENOUS at 07:15

## 2022-07-14 RX ADMIN — DIPHENHYDRAMINE HYDROCHLORIDE 25 MG: 50 INJECTION, SOLUTION INTRAMUSCULAR; INTRAVENOUS at 11:11

## 2022-07-14 RX ADMIN — BUPIVACAINE HYDROCHLORIDE IN DEXTROSE 2 ML: 7.5 INJECTION, SOLUTION SUBARACHNOID at 08:29

## 2022-07-14 RX ADMIN — Medication 87.3 MILLI-UNITS/MIN: at 10:20

## 2022-07-14 RX ADMIN — OXYCODONE 10 MG: 5 TABLET ORAL at 20:55

## 2022-07-14 RX ADMIN — Medication 999 ML/HR: at 08:53

## 2022-07-14 RX ADMIN — KETOROLAC TROMETHAMINE 30 MG: 30 INJECTION, SOLUTION INTRAMUSCULAR; INTRAVENOUS at 11:14

## 2022-07-14 ASSESSMENT — PAIN DESCRIPTION - ORIENTATION
ORIENTATION: MID
ORIENTATION: MID;LOWER

## 2022-07-14 ASSESSMENT — PAIN - FUNCTIONAL ASSESSMENT
PAIN_FUNCTIONAL_ASSESSMENT: ACTIVITIES ARE NOT PREVENTED
PAIN_FUNCTIONAL_ASSESSMENT: ACTIVITIES ARE NOT PREVENTED

## 2022-07-14 ASSESSMENT — PAIN SCALES - GENERAL
PAINLEVEL_OUTOF10: 6
PAINLEVEL_OUTOF10: 8
PAINLEVEL_OUTOF10: 6

## 2022-07-14 ASSESSMENT — PAIN DESCRIPTION - LOCATION
LOCATION: ABDOMEN

## 2022-07-14 ASSESSMENT — PAIN DESCRIPTION - DESCRIPTORS
DESCRIPTORS: ACHING
DESCRIPTORS: ACHING;TIGHTNESS;STABBING
DESCRIPTORS: ACHING
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
## 2022-07-14 NOTE — OP NOTE
Operative Note      Patient: Екатерина Brewer  YOB: 2000  MRN: [id number]    Date of Procedure: 2022    Pre-Op Diagnosis: Term pregnancy with a previous  section history of gestational diabetes and morbid obesity [Z98.891]    Post-Op Diagnosis: Same delivery of a male infant with Apgar 9 at 1 minute 9 at 5 minutes 7 pound 13 ounce       Procedure(s):  REPEAT  SECTION, omental adhesions to the peritoneum    Surgeon(s):  Sidney Davis MD    Assistant:   Surgical Assistant: Nicole Bermudez RN    Anesthesia: Spinal    Estimated Blood Loss (mL): 216     Complications: None    Specimens:   * No specimens in log *    Implants:  * No implants in log *      Drains:   Urinary Catheter (Active)   Urine Color Yellow 22 1033   Urine Appearance Clear 22 1033   Output (mL) 1400 mL 22 1033       Findings:     Detailed Description of Procedure:   Patient Name: Екатерина Brewer  YOB: 2000  MRN:    [id number]    Date: 2022  PROCEDURE IN BRIEF: The patient, under spinal anesthesia anesthesia, in the supine    position with left lateral tilt and with a Welsh catheter in place, parts were painted and   draped as usual.   A Pfannenstiel incision made on  the previous scar and the abdomen was opened   in layers in the standard manner. Hemostasis achieved in the subcutaneous   layers. An incision was made in the fascia and then the fascia was    from the underlying  muscles by sharp and blunt dissection in the   cephalad as well as in the caudal region and thereafter muscles were split in   the center. Peritoneum was opened caudally and then the incision was   extended cephalad . A bladder blade was placed and the   bladder flap was incised in a semi-lunar fashion and the bladder is    from the underlying lower segment by blunt dissection and   thereafter the lower segment is exposed.  An incision is made in the lower   segment and deepened until the cavity is reached. Clear fluid started   leaking. The incision is split apart in a transverse manner. The head is   delivered into the field. Mouth and nares were suctioned. The anterior   shoulder is delivered. The posterior shoulder is delivered. Simultaneous   fundal pressure was applied by the assistant and then the trunk is delivered   followed by the lower extremities. Cord is clamped, cut between the clamps,   baby cried immediately after birth. The baby was handed over to the nurse. Then placenta, membranes and cord delivered. The patient has been given   Pitocin in IV fluids. Uterus is firm and well contracted. Uterus is exteriorized,Simmons clamps are placed on edges of uterine incision  Uterine cavity is cleansed with moist tape and ,Cervical canal is opened with ring forceps and ring forceps is placed off the sterile field. and uterine   incision is sutured in 2 layers  with the help of 0 Vicryl continuous   locking stitches with a 2 pieces of suture material for the first layer and   then the second layer imbricating the first layer with the help of 2 pieces   of suture material hemostasis observed Then the bladder  peritoneum was placed together with   the help of 2-0 Vicryl continuous nonlocking stitches and the uterus was   placed back into the abdominal cavity. At this point all the clots are removed from the paracolic gutters on either   side and hemostasis observed. parietal  peritoneum is placed together the help of 2-0 Vicryl continuous nonlocking   sutures and hemostasis is observed in muscle layers. then the fascia is placed together with the help of 0 Vicryl   continuous nonlocking stitches with the help of 2 pieces suture material and   then subcutaneous fat is placed together with the help of interrupted sutures   and the skin is stapled together with insorb. Steri-Strips are placed. A pressure   dressing is placed. The patient is in stable condition.  Patient is   transferred to the recovery room with stable vital signs and in stable   condition.      Signed:   Dr Mary Conway M.D.  7/14/2022  11:03 AM        Electronically signed by Mary Conway MD on 7/14/2022 at 11:01 AM

## 2022-07-14 NOTE — PROGRESS NOTES
ambulatory to L&D for scheduled C/S  EFM applied. Maternal perception of fetal movement noted. Patient denies bleeding, leaking of fluid, or contractions. Call light in reach.
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
## 2022-07-14 NOTE — ANESTHESIA POSTPROCEDURE EVALUATION
Department of Anesthesiology  Postprocedure Note    Patient: Lyle Merlin  MRN: [id number]  YOB: 2000  Date of evaluation: 2022      Procedure Summary     Date: 22 Room / Location: [facility] OR [address] [facility]    Anesthesia Start: 820 Anesthesia Stop:     Procedure: REPEAT  SECTION (N/A Abdomen) Diagnosis:       S/P repeat low transverse       (S/P repeat low transverse  [F54.925])    Surgeons: Leslie Verduzco MD Responsible Provider: Meet Macias MD    Anesthesia Type: spinal ASA Status: 3          Anesthesia Type: No value filed.     Marlon Phase I: Marlon Score: 10    Marlon Phase II: Marlon Score: 10      Anesthesia Post Evaluation    Patient location during evaluation: PACU  Patient participation: complete - patient participated  Level of consciousness: awake  Airway patency: patent  Nausea & Vomiting: no nausea and no vomiting  Complications: no  Cardiovascular status: hemodynamically stable  Respiratory status: acceptable  Hydration status: euvolemic
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
## 2022-07-14 NOTE — PROGRESS NOTES
Patient transferred to room 214 from recovery, oriented to room and plan of care, patient denies discomfort, marta care, RSA

## 2022-07-15 LAB
HCT VFR BLD CALC: 27.5 % (ref 34–48)
HEMOGLOBIN: 9.1 G/DL (ref 11.5–15.5)

## 2022-07-15 PROCEDURE — 1220000001 HC SEMI PRIVATE L&D R&B

## 2022-07-15 PROCEDURE — 85018 HEMOGLOBIN: CPT

## 2022-07-15 PROCEDURE — 6370000000 HC RX 637 (ALT 250 FOR IP): Performed by: OBSTETRICS & GYNECOLOGY

## 2022-07-15 PROCEDURE — 6360000002 HC RX W HCPCS: Performed by: ANESTHESIOLOGY

## 2022-07-15 PROCEDURE — 85014 HEMATOCRIT: CPT

## 2022-07-15 PROCEDURE — 36415 COLL VENOUS BLD VENIPUNCTURE: CPT

## 2022-07-15 RX ORDER — OXYCODONE HYDROCHLORIDE 5 MG/1
5 TABLET ORAL EVERY 4 HOURS PRN
Status: DISPENSED | OUTPATIENT
Start: 2022-07-15 | End: 2022-07-16

## 2022-07-15 RX ORDER — OXYCODONE HYDROCHLORIDE 5 MG/1
10 TABLET ORAL EVERY 4 HOURS PRN
Status: DISPENSED | OUTPATIENT
Start: 2022-07-15 | End: 2022-07-16

## 2022-07-15 RX ADMIN — DOCUSATE SODIUM 100 MG: 100 CAPSULE, LIQUID FILLED ORAL at 10:06

## 2022-07-15 RX ADMIN — PRENATAL WITH FERROUS FUM AND FOLIC ACID 1 TABLET: 3080; 920; 120; 400; 22; 1.84; 3; 20; 10; 1; 12; 200; 27; 25; 2 TABLET ORAL at 10:06

## 2022-07-15 RX ADMIN — OXYCODONE 10 MG: 5 TABLET ORAL at 16:26

## 2022-07-15 RX ADMIN — OXYCODONE 10 MG: 5 TABLET ORAL at 22:49

## 2022-07-15 RX ADMIN — IBUPROFEN 800 MG: 800 TABLET, FILM COATED ORAL at 10:06

## 2022-07-15 RX ADMIN — OXYCODONE 5 MG: 5 TABLET ORAL at 12:41

## 2022-07-15 RX ADMIN — SIMETHICONE 80 MG: 80 TABLET, CHEWABLE ORAL at 20:50

## 2022-07-15 RX ADMIN — FERROUS SULFATE TAB 325 MG (65 MG ELEMENTAL FE) 325 MG: 325 (65 FE) TAB at 10:06

## 2022-07-15 RX ADMIN — KETOROLAC TROMETHAMINE 30 MG: 30 INJECTION, SOLUTION INTRAMUSCULAR; INTRAVENOUS at 00:19

## 2022-07-15 RX ADMIN — IBUPROFEN 800 MG: 800 TABLET, FILM COATED ORAL at 20:50

## 2022-07-15 ASSESSMENT — PAIN SCALES - GENERAL
PAINLEVEL_OUTOF10: 0
PAINLEVEL_OUTOF10: 3
PAINLEVEL_OUTOF10: 8
PAINLEVEL_OUTOF10: 5
PAINLEVEL_OUTOF10: 7
PAINLEVEL_OUTOF10: 9

## 2022-07-15 ASSESSMENT — PAIN DESCRIPTION - LOCATION
LOCATION: INCISION;ABDOMEN
LOCATION: ABDOMEN;CHEST;INCISION
LOCATION: ABDOMEN
LOCATION: ABDOMEN
LOCATION: INCISION

## 2022-07-15 ASSESSMENT — PAIN DESCRIPTION - ORIENTATION
ORIENTATION: LOWER
ORIENTATION: MID
ORIENTATION: LOWER
ORIENTATION: LOWER

## 2022-07-15 ASSESSMENT — PAIN DESCRIPTION - DESCRIPTORS
DESCRIPTORS: BURNING;DISCOMFORT
DESCRIPTORS: ACHING;CRAMPING;DISCOMFORT;SHARP
DESCRIPTORS: CRAMPING
DESCRIPTORS: ACHING
DESCRIPTORS: CRAMPING;DISCOMFORT;PRESSURE

## 2022-07-15 ASSESSMENT — PAIN - FUNCTIONAL ASSESSMENT
PAIN_FUNCTIONAL_ASSESSMENT: ACTIVITIES ARE NOT PREVENTED

## 2022-07-15 NOTE — PROGRESS NOTES
Assuming care of patient. Patient resting in bed. Ambulation encouraged in paulino. Plan of care discussed with patient; patient verbalizes understanding. Call light in reach.

## 2022-07-15 NOTE — PROGRESS NOTES
Subjective:     Postpartum Day 1:  Delivery    The patient feels well. The patient denies emotional concerns. Pain is well controlled with current medications. The baby iswell. Baby is feeding via bottle - Similac with low iron. Urinary output is adequate. The patient is ambulating well. The patient is tolerating a normal diet. Flatus has been passed. Objective:        Blood pressure (!) 141/69, pulse 77, temperature 97.9 °F (36.6 °C), temperature source Oral, resp. rate 15, last menstrual period 09/15/2021, SpO2 97 %, unknown if currently breastfeeding. No intake/output data recorded. Lab Results   Component Value Date    WBC 13.4 (H) 2022    HGB 9.1 (L) 07/15/2022    HCT 27.5 (L) 07/15/2022    MCV 88.7 2022     2022       General:    alert, appears stated age, and cooperative   Lungs:    Lochia:  appropriate   Uterine    firmnontender   Incision:  healing well, no significant drainage, no dehiscence, no significant erythema   DVT Evaluation:  No evidence of DVT seen on physical exam.  Negative Torito's sign.       Results for orders placed or performed during the hospital encounter of 22   Comprehensive Metabolic Panel   Result Value Ref Range    Sodium 135 132 - 146 mmol/L    Potassium 4.1 3.5 - 5.0 mmol/L    Chloride 105 98 - 107 mmol/L    CO2 18 (L) 22 - 29 mmol/L    Anion Gap 12 7 - 16 mmol/L    Glucose 90 74 - 99 mg/dL    BUN 7 6 - 20 mg/dL    CREATININE 0.5 0.5 - 1.0 mg/dL    GFR Non-African American >60 >=60 mL/min/1.73    GFR African American >60     Calcium 8.6 8.6 - 10.2 mg/dL    Total Protein 6.3 (L) 6.4 - 8.3 g/dL    Albumin 3.2 (L) 3.5 - 5.2 g/dL    Total Bilirubin <0.2 0.0 - 1.2 mg/dL    Alkaline Phosphatase 189 (H) 35 - 104 U/L    ALT 9 0 - 32 U/L    AST 12 0 - 31 U/L   CBC   Result Value Ref Range    WBC 13.4 (H) 4.5 - 11.5 E9/L    RBC 3.63 3.50 - 5.50 E12/L    Hemoglobin 10.5 (L) 11.5 - 15.5 g/dL    Hematocrit 32.2 (L) 34.0 - 48.0 %    MCV 88.7 80.0 - 99.9 fL    MCH 28.9 26.0 - 35.0 pg    MCHC 32.6 32.0 - 34.5 %    RDW 13.9 11.5 - 15.0 fL    Platelets 081 484 - 306 E9/L    MPV 11.4 7.0 - 12.0 fL   DRUG SCREEN MULTI URINE   Result Value Ref Range    Amphetamine Screen, Urine NOT DETECTED Negative <1000 ng/mL    Barbiturate Screen, Ur NOT DETECTED Negative < 200 ng/mL    Benzodiazepine Screen, Urine NOT DETECTED Negative < 200 ng/mL    Cannabinoid Scrn, Ur NOT DETECTED Negative < 50ng/mL    Cocaine Metabolite Screen, Urine NOT DETECTED Negative < 300 ng/mL    Opiate Scrn, Ur NOT DETECTED Negative < 300ng/mL    PCP Screen, Urine NOT DETECTED Negative < 25 ng/mL    Methadone Screen, Urine NOT DETECTED Negative <300 ng/mL    Oxycodone Urine NOT DETECTED Negative <100 ng/mL    FENTANYL SCREEN, URINE NOT DETECTED Negative <1 ng/mL    Drug Screen Comment: see below    Hemoglobin and hematocrit, blood   Result Value Ref Range    Hemoglobin 9.1 (L) 11.5 - 15.5 g/dL    Hematocrit 27.5 (L) 34.0 - 48.0 %   TYPE AND SCREEN   Result Value Ref Range    ABO/Rh O POS     Antibody Screen NEG        Assessment:     Status post  section. POD #  1 Doing well postoperatively. male  Principal Problem:    Term pregnancy  Resolved Problems:    * No resolved hospital problems. *    Plan:     Continue current care. Ambulate as tolerated  Continue PNV po daily. Pain meds as needed.   Iron sulfate p.o. daily  Circumcision done for the baby today  Mario Morin MD,MLISA.  7/15/2022  3:59 PM        Farheen Martel  2000  [phone]
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
## 2022-07-15 NOTE — PROGRESS NOTES
Hearing screening results were discussed with parent. Questions answered. Brochure given to parent. Advised to monitor developmental milestones and contact physician for any concerns.    Electronically signed by Pratima Michaud on 7/15/2022 at 2:49 PM

## 2022-07-15 NOTE — CARE COORDINATION
NATALIA attempted to meet with Chaparrita to discuss potential new living situations. NATALIA was unable to meet with Monique Hernandez due to her birth mother (current living situation) being in the room. NATALIA gave Monique Heranndez continued support, Monique Hernandez was extremely appreciative of this. NATALIA has contacted [address] [place] [address] who will be working this weekend for continued support.

## 2022-07-16 PROCEDURE — 1220000001 HC SEMI PRIVATE L&D R&B

## 2022-07-16 PROCEDURE — 6370000000 HC RX 637 (ALT 250 FOR IP): Performed by: OBSTETRICS & GYNECOLOGY

## 2022-07-16 RX ORDER — OXYCODONE HYDROCHLORIDE 5 MG/1
5 TABLET ORAL EVERY 4 HOURS PRN
Status: DISCONTINUED | OUTPATIENT
Start: 2022-07-16 | End: 2022-07-17 | Stop reason: HOSPADM

## 2022-07-16 RX ORDER — ACETAMINOPHEN 500 MG
1000 TABLET ORAL EVERY 8 HOURS PRN
Status: DISCONTINUED | OUTPATIENT
Start: 2022-07-16 | End: 2022-07-17 | Stop reason: HOSPADM

## 2022-07-16 RX ORDER — OXYCODONE HYDROCHLORIDE 5 MG/1
10 TABLET ORAL EVERY 4 HOURS PRN
Status: DISCONTINUED | OUTPATIENT
Start: 2022-07-16 | End: 2022-07-17 | Stop reason: HOSPADM

## 2022-07-16 RX ADMIN — OXYCODONE 10 MG: 5 TABLET ORAL at 13:58

## 2022-07-16 RX ADMIN — IBUPROFEN 800 MG: 800 TABLET, FILM COATED ORAL at 05:06

## 2022-07-16 RX ADMIN — FERROUS SULFATE TAB 325 MG (65 MG ELEMENTAL FE) 325 MG: 325 (65 FE) TAB at 08:42

## 2022-07-16 RX ADMIN — IBUPROFEN 800 MG: 800 TABLET, FILM COATED ORAL at 17:34

## 2022-07-16 RX ADMIN — PRENATAL WITH FERROUS FUM AND FOLIC ACID 1 TABLET: 3080; 920; 120; 400; 22; 1.84; 3; 20; 10; 1; 12; 200; 27; 25; 2 TABLET ORAL at 08:43

## 2022-07-16 RX ADMIN — DOCUSATE SODIUM 100 MG: 100 CAPSULE, LIQUID FILLED ORAL at 08:43

## 2022-07-16 RX ADMIN — OXYCODONE 10 MG: 5 TABLET ORAL at 23:34

## 2022-07-16 RX ADMIN — FERROUS SULFATE TAB 325 MG (65 MG ELEMENTAL FE) 325 MG: 325 (65 FE) TAB at 17:34

## 2022-07-16 RX ADMIN — OXYCODONE 10 MG: 5 TABLET ORAL at 08:42

## 2022-07-16 ASSESSMENT — PAIN DESCRIPTION - DESCRIPTORS
DESCRIPTORS: BURNING;SHARP
DESCRIPTORS: BURNING;DISCOMFORT;SORE
DESCRIPTORS: BURNING;DISCOMFORT;SORE

## 2022-07-16 ASSESSMENT — PAIN SCALES - GENERAL
PAINLEVEL_OUTOF10: 6
PAINLEVEL_OUTOF10: 7
PAINLEVEL_OUTOF10: 3
PAINLEVEL_OUTOF10: 7
PAINLEVEL_OUTOF10: 6

## 2022-07-16 ASSESSMENT — PAIN - FUNCTIONAL ASSESSMENT: PAIN_FUNCTIONAL_ASSESSMENT: ACTIVITIES ARE NOT PREVENTED

## 2022-07-16 ASSESSMENT — PAIN DESCRIPTION - LOCATION: LOCATION: INCISION

## 2022-07-16 NOTE — PROGRESS NOTES
Subjective:     Postpartum Day 2:  Delivery    The patient feels well. The patient denies emotional concerns. Pain is well controlled with current medications. The baby iswell. Baby is feeding via bottle - Similac with low iron. Urinary output is adequate. The patient is ambulating well. The patient is tolerating a normal diet. Flatus has been passed. Objective:        Blood pressure 127/83, pulse 89, temperature 97.9 °F (36.6 °C), temperature source Oral, resp. rate 16, last menstrual period 09/15/2021, SpO2 98 %, unknown if currently breastfeeding. No intake/output data recorded.       Lab Results   Component Value Date    WBC 13.4 (H) 2022    HGB 9.1 (L) 07/15/2022    HCT 27.5 (L) 07/15/2022    MCV 88.7 2022     2022       General:    alert, appears stated age, and cooperative   Lungs:    Lochia:  appropriate   Uterine    firmnontender   Incision:  healing well, no significant drainage, no dehiscence, no significant erythema   DVT Evaluation:  No evidence of DVT seen on physical exam.      Results for orders placed or performed during the hospital encounter of 22   Comprehensive Metabolic Panel   Result Value Ref Range    Sodium 135 132 - 146 mmol/L    Potassium 4.1 3.5 - 5.0 mmol/L    Chloride 105 98 - 107 mmol/L    CO2 18 (L) 22 - 29 mmol/L    Anion Gap 12 7 - 16 mmol/L    Glucose 90 74 - 99 mg/dL    BUN 7 6 - 20 mg/dL    CREATININE 0.5 0.5 - 1.0 mg/dL    GFR Non-African American >60 >=60 mL/min/1.73    GFR African American >60     Calcium 8.6 8.6 - 10.2 mg/dL    Total Protein 6.3 (L) 6.4 - 8.3 g/dL    Albumin 3.2 (L) 3.5 - 5.2 g/dL    Total Bilirubin <0.2 0.0 - 1.2 mg/dL    Alkaline Phosphatase 189 (H) 35 - 104 U/L    ALT 9 0 - 32 U/L    AST 12 0 - 31 U/L   CBC   Result Value Ref Range    WBC 13.4 (H) 4.5 - 11.5 E9/L    RBC 3.63 3.50 - 5.50 E12/L    Hemoglobin 10.5 (L) 11.5 - 15.5 g/dL    Hematocrit 32.2 (L) 34.0 - 48.0 %    MCV 88.7 80.0 - 99.9 fL    MCH 28.9 26.0 - 35.0 pg    MCHC 32.6 32.0 - 34.5 %    RDW 13.9 11.5 - 15.0 fL    Platelets 583 027 - 034 E9/L    MPV 11.4 7.0 - 12.0 fL   DRUG SCREEN MULTI URINE   Result Value Ref Range    Amphetamine Screen, Urine NOT DETECTED Negative <1000 ng/mL    Barbiturate Screen, Ur NOT DETECTED Negative < 200 ng/mL    Benzodiazepine Screen, Urine NOT DETECTED Negative < 200 ng/mL    Cannabinoid Scrn, Ur NOT DETECTED Negative < 50ng/mL    Cocaine Metabolite Screen, Urine NOT DETECTED Negative < 300 ng/mL    Opiate Scrn, Ur NOT DETECTED Negative < 300ng/mL    PCP Screen, Urine NOT DETECTED Negative < 25 ng/mL    Methadone Screen, Urine NOT DETECTED Negative <300 ng/mL    Oxycodone Urine NOT DETECTED Negative <100 ng/mL    FENTANYL SCREEN, URINE NOT DETECTED Negative <1 ng/mL    Drug Screen Comment: see below    Hemoglobin and hematocrit, blood   Result Value Ref Range    Hemoglobin 9.1 (L) 11.5 - 15.5 g/dL    Hematocrit 27.5 (L) 34.0 - 48.0 %   TYPE AND SCREEN   Result Value Ref Range    ABO/Rh O POS     Antibody Screen NEG        Assessment:     Status post  section. POD #  2Doing well postoperatively. male  Principal Problem:    Term pregnancy  Resolved Problems:    * No resolved hospital problems. *    Plan:     Continue current care. Ambulate as tolerated  Continue PNV po daily. Pain meds as needed.   Iron sulfate p.o. daily,pt desires to go home in am.    Aron Sanders MD,M.D.  2022  10:47 AM        Savannah FINCH Martel  2000  [phone]

## 2022-07-16 NOTE — PROGRESS NOTES
Assuming care of patient. Patient resting in bed. Ambulation continued to be encouraged. Plan of care discussed with patient; patient verbalizes understanding. Call light in reach.

## 2022-07-16 NOTE — DISCHARGE INSTRUCTIONS
Follow up with Dr. Lisa Noble in the office on Friday for an incision check. No sexual intercourse, tampon use, douching, tub baths, or swimming for 6 weeks until the healing process has completed and you have been cleared by Dr. Lisa Noble. No heavy lifting or strenuous activity for 6 weeks. No driving for 2 weeks.  Section: What to Expect at [address] [place] [address]    A  section, or , is surgery to deliver your baby through a cut, called an incision, that the doctor makes in your lower belly and uterus. You may have some pain in your lower belly and need pain medicine for 1 to 2 weeks. You can expect some vaginal bleeding for several weeks. You will probably need about 6 weeks to fully recover. It is important to take it easy while the incision is healing. Avoid heavy lifting, strenuous activities, or exercises that strain the belly muscles while you are recovering. Ask a family member or friend for help with housework, cooking, and shopping. This care sheet gives you a general idea about how long it will take for you to recover. But each person recovers at a different pace. Follow the steps below to get better as quickly as possible. How can you care for yourself at home? Activity    Rest when you feel tired. Getting enough sleep will help you recover. Try to walk each day. Start by walking a little more than you did the day before. Bit by bit, increase the amount you walk. Walking boosts blood flow and helps prevent pneumonia, constipation, and blood clots. Avoid strenuous activities, such as bicycle riding, jogging, weightlifting, and aerobic exercise, for 6 weeks or until your doctor says it is okay. Until your doctor says it is okay, do not lift anything heavier than a gallon of milk. Do not do sit-ups or other exercises that strain the belly muscles for 6 weeks or until your doctor says it is okay.      Hold a pillow over your incision when you cough or take deep breaths. This will support your belly and decrease your pain. You may shower as usual. Pat the incision dry when you are done. You will have some vaginal bleeding. Wear sanitary pads. Do not douche or use tampons until your doctor says it is okay. Ask your doctor when you can drive again. You will probably need to take at least 6 weeks off work. It depends on the type of work you do and how you feel. Ask your doctor when it is okay for you to have sex. Diet    You can eat your normal diet. If your stomach is upset, try bland, low-fat foods like plain rice, broiled chicken, toast, and yogurt. Drink plenty of fluids (unless your doctor tells you not to). You may notice that your bowel movements are not regular right after your surgery. This is common. Try to avoid constipation and straining with bowel movements. You may want to take a fiber supplement every day. If you have not had a bowel movement after a couple of days, ask your doctor about taking a mild laxative. If you are breastfeeding, do not drink any alcohol. Medicines    Your doctor will tell you if and when you can restart your medicines. He or she will also give you instructions about taking any new medicines. If you take blood thinners, such as warfarin (Coumadin), clopidogrel (Plavix), or aspirin, be sure to talk to your doctor. He or she will tell you if and when to start taking those medicines again. Make sure that you understand exactly what your doctor wants you to do. Take pain medicines exactly as directed. If the doctor gave you a prescription medicine for pain, take it as prescribed. If you are not taking a prescription pain medicine, ask your doctor if you can take an over-the-counter medicine. If you think your pain medicine is making you sick to your stomach: Take your medicine after meals (unless your doctor has told you not to). Ask your doctor for a different pain medicine. If your doctor prescribed antibiotics, take them as directed. Do not stop taking them just because you feel better. You need to take the full course of antibiotics. Incision care    If you have strips of tape on the incision, leave the tape on for a week or until it falls off. Wash the area daily with warm, soapy water, and pat it dry. Don't use hydrogen peroxide or alcohol, which can slow healing. You may cover the area with a gauze bandage if it weeps or rubs against clothing. Change the bandage every day. Keep the area clean and dry. Follow-up care is a key part of your treatment and safety. Be sure to make and go to all appointments, and call your doctor if you are having problems. It's also a good idea to know your test results and keep a list of the medicines you take. When should you call for help? Call 911 anytime you think you may need emergency care. For example, call if:    You passed out (lost consciousness). You have chest pain, are short of breath, or cough up blood. Call your doctor now or seek immediate medical care if:    You have pain that does not get better after you take pain medicine. You have severe vaginal bleeding. You are dizzy or lightheaded, or you feel like you may faint. You have new or worse pain in your belly or pelvis. You have loose stitches, or your incision comes open. You have symptoms of infection, such as: Increased pain, swelling, warmth, or redness. Red streaks leading from the incision. Pus draining from the incision. A fever. You have symptoms of a blood clot in your leg (called a deep vein thrombosis), such as:  Pain in your calf, back of the knee, thigh, or groin. Redness and swelling in your leg or groin. Watch closely for changes in your health, and be sure to contact your doctor if:    You do not get better as expected. Where can you learn more? Go to https://chyovana.health-partners. org and sign in to your [employer] account. Enter M806 in the [facility] box to learn more about \" Section: What to Expect at Home. \"     If you do not have an account, please click on the \"Sign Up Now\" link. Current as of: 2017  Content Version: 11.7  © 8556-8931 [employer]. Care instructions adapted under license by [facility] ([facility]). If you have questions about a medical condition or this instruction, always ask your healthcare professional. [address] any warranty or liability for your use of this information. Depression After Birth: Care Instructions  Overview  Many women get the \"baby blues\" during the first few days after childbirth. You may lose sleep, feel irritable, and cry easily. You may feel happy one minute and sad the next. Hormone changes are one cause of these emotional changes. The \"baby blues\" often peak around the fourth day. Then they ease up in less than 2 weeks. If your moodiness or anxiety lasts for more than 2 weeks, or if you feel like life is not worth living, you may have postpartum depression. Depression is not a sign of weakness. It's a medical condition that requires treatment. Medicine and counseling often work well to reduce depression. Follow-up care is a key part of your treatment and safety. Be sure to make and go to all appointments, and call your doctor if you are having problems. It's also a good idea to know your test results and keep alist of the medicines you take. How do you know if you are depressed? With all the changes in your life, you may not know if you are depressed. Pregnancy sometimes causes changes in how you feel that are similar to thesymptoms of depression. Symptoms of depression include:  Feeling sad or hopeless and losing interest in daily activities. These are the most common symptoms of depression. Sleeping too much or not enough. Feeling tired. You may feel as if you have no energy.   Eating too much or too little. Writing or talking about death, such as writing suicide notes or talking about guns, knives, or pills. If you or someone you know talks about suicide, self-harm, or feeling hopeless, get help right away. Call the [address] [place] [address] at 8-988-554-TALK (3-589.629.6070) or text HOME to 612992 to access the [employer]. Consider saving these numbers in your phone. How can you care for yourself at home? Be safe with medicines. Take your medicines exactly as prescribed. Call your doctor if you think you are having a problem with your medicine. Eat a healthy diet so that you can keep up your energy. Get regular daily exercise, such as walks, to help improve your mood. Get as much sunlight as possible. Keep your shades and curtains open. Get outside as much as you can. Avoid using alcohol or other substances to feel better. Get as much rest and sleep as possible. Avoid doing too much. Being too tired can increase depression. Play stimulating music throughout your day and soothing music at night. Schedule outings and visits with friends and family. Ask them to call you regularly, so that you don't feel alone. Ask for help with preparing food and other daily tasks. Be honest with yourself and those who care about you. Tell them about your struggle. If you or someone you know talks about suicide, self-harm, or feeling hopeless, get help right away. Call the [address] [place] [address] at 2-147-057-TALK (8-284.230.3911) or text HOME to 912576 to access the Crisis Text Line. Consider saving these numbers in your phone. When should you call for help? Call 911 anytime you think you may need emergency care. For example, call if:    You feel you cannot stop from hurting yourself or someone else. Call your doctor now or seek immediate medical care if:    You are having trouble caring for yourself. You hear voices.    Watch closely for changes in your health, and be sure to contact your doctor if:    You have problems with your depression medicine. You do not get better as expected. Where can you learn more? Go to https://chpepiceweb.[employer]. org and sign in to your [employer] account. Enter ANTOINE in the [employer] box to learn more about \"Depression After Childbirth: Care Instructions. \"     If you do not have an account, please click on the \"Sign Up Now\" link. Current as of: February 9, 2022               Content Version: 13.3  © 2480-3003 [employer]. Care instructions adapted under license by [facility] ([facility]). If you have questions about a medical condition or this instruction, always ask your healthcare professional. [address] any warranty or liability for your use of this information.

## 2022-07-16 NOTE — PROGRESS NOTES
Assumed care of patient. Plan of care for tonight discussed, patient verbalizes understanding and has no questions at this time. Patient is resting quietly with call light in reach.

## 2022-07-17 VITALS
TEMPERATURE: 97.8 F | RESPIRATION RATE: 18 BRPM | SYSTOLIC BLOOD PRESSURE: 123 MMHG | OXYGEN SATURATION: 97 % | DIASTOLIC BLOOD PRESSURE: 94 MMHG | HEART RATE: 80 BPM

## 2022-07-17 PROCEDURE — 6370000000 HC RX 637 (ALT 250 FOR IP): Performed by: OBSTETRICS & GYNECOLOGY

## 2022-07-17 RX ORDER — OXYCODONE HYDROCHLORIDE AND ACETAMINOPHEN 5; 325 MG/1; MG/1
1 TABLET ORAL EVERY 8 HOURS PRN
Qty: 15 TABLET | Refills: 0 | Status: SHIPPED | OUTPATIENT
Start: 2022-07-17 | End: 2022-07-22

## 2022-07-17 RX ORDER — FERROUS SULFATE 325(65) MG
325 TABLET ORAL 2 TIMES DAILY
Qty: 30 TABLET | Refills: 2 | Status: SHIPPED | OUTPATIENT
Start: 2022-07-17

## 2022-07-17 RX ORDER — IBUPROFEN 800 MG/1
800 TABLET ORAL EVERY 8 HOURS PRN
Qty: 21 TABLET | Refills: 0 | Status: SHIPPED | OUTPATIENT
Start: 2022-07-17 | End: 2022-07-25

## 2022-07-17 RX ADMIN — IBUPROFEN 800 MG: 800 TABLET, FILM COATED ORAL at 07:56

## 2022-07-17 RX ADMIN — OXYCODONE 10 MG: 5 TABLET ORAL at 19:05

## 2022-07-17 RX ADMIN — PRENATAL WITH FERROUS FUM AND FOLIC ACID 1 TABLET: 3080; 920; 120; 400; 22; 1.84; 3; 20; 10; 1; 12; 200; 27; 25; 2 TABLET ORAL at 09:03

## 2022-07-17 RX ADMIN — OXYCODONE 10 MG: 5 TABLET ORAL at 13:11

## 2022-07-17 RX ADMIN — FERROUS SULFATE TAB 325 MG (65 MG ELEMENTAL FE) 325 MG: 325 (65 FE) TAB at 09:03

## 2022-07-17 RX ADMIN — DOCUSATE SODIUM 100 MG: 100 CAPSULE, LIQUID FILLED ORAL at 09:03

## 2022-07-17 ASSESSMENT — PAIN SCALES - GENERAL
PAINLEVEL_OUTOF10: 7
PAINLEVEL_OUTOF10: 7
PAINLEVEL_OUTOF10: 3

## 2022-07-17 ASSESSMENT — PAIN DESCRIPTION - DESCRIPTORS
DESCRIPTORS: BURNING;DISCOMFORT;SORE
DESCRIPTORS: BURNING;DISCOMFORT;SORE

## 2022-07-17 NOTE — CARE COORDINATION
Peer Recovery Support Note    Name: Bryson Elliott  Date: 2022    Chief Complaint   Patient presents with    Scheduled        Peer Support met with patient. [x] Support and education provided  [] Resources provided   [] Treatment referral:   [] Other:   [] Patient declined peer recovery services     Referred By: Thea Sidhu gave more support today and patient is going to continue to go to [facility] for IOP. Patient express no concern with Adopted Mother caring for the baby after discharge. More worried about her Birth Mother and her not understanding.     Lesvia Hummel, 2022

## 2022-07-17 NOTE — DISCHARGE SUMMARY
Physician Discharge Summary     Patient ID:  Kemi Mackay  [id number]  72 y.o.  2000    Admit date: 2022    Discharge date and time:  2022    Admitting Physician: Obie Carrasquillo MD     Diagnoses: S/P repeat low transverse  [Z98.891]  Term pregnancy [Z34.90]    Discharged Condition: fair    Indication for Admission: S/P repeat low transverse  [Z98.891]  Term pregnancy [Z34.90]male infant Apgar 9 at 1 minute and 9 at 5 minutes  3544 g  Procedures Performed:  without labor        Information for the patient's :  Eddie Jimenez [[id number]]            Information for the patient's :  Eddie Jimenez [[id number]]        Hospital Course: Patient was admitted  and underwent an uncomplicated procedure. postopcare was uncomplicated. H/H stable,Vital stable,Voided without probs,had passed flatus, incision and wound dry no bleeding or oozing,moderate lochia at perineum    Discharge Exam:  General appearance: alert  Abdomen: soft, non-tender; bowel sounds normal; no masses,  no organomegaly  Back: negative  Extremities: extremities warm, atraumatic, no cyanosis or edema  Homans sign is negative, no sign of   moderate lochia    Disposition: home    Patient Instructions: Activity: activity as tolerated  Diet: regular diet  Wound Care: keep wound clean and dry    Discharge Medication:      Medication List        START taking these medications      ferrous sulfate 325 (65 Fe) MG tablet  Commonly known as: IRON 325  Take 1 tablet by mouth in the morning and 1 tablet before bedtime. ibuprofen 800 MG tablet  Commonly known as: IBU  Take 1 tablet by mouth every 8 hours as needed for Pain 1 tablet orally every 6-8 hours as needed for pain     oxyCODONE-acetaminophen 5-325 MG per tablet  Commonly known as: Percocet  Take 1 tablet by mouth every 8 hours as needed for Pain for up to 5 days. Intended supply: 5 days.  Take lowest dose possible to manage pain ASK your doctor about these medications      acetaminophen 500 MG tablet  Commonly known as: TYLENOL  Take 1 tablet by mouth every 6 hours as needed for Pain     ondansetron 4 MG disintegrating tablet  Commonly known as: Zofran ODT  Take 1 tablet by mouth every 12 hours as needed for Nausea or Vomiting     potassium chloride 10 MEQ extended release capsule  Commonly known as: MICRO-K     Prena 1 True 30-1.4 & 300 MG Misc     PRENATAL 1 PO               Where to Get Your Medications        You can get these medications from any pharmacy    Bring a paper prescription for each of these medications  ferrous sulfate 325 (65 Fe) MG tablet  ibuprofen 800 MG tablet  oxyCODONE-acetaminophen 5-325 MG per tablet          Follow-up with Estevan Price MD, M.D. in 1 week.     Signed:  Estevan Price MD,MLISA.  7/17/2022  9:58 AM

## 2022-07-17 NOTE — PROGRESS NOTES
Bohannon placed in bassinet after patient was found sleeping in bed with baby. Safe sleep reinforced with patient; verbalizes understanding. Call light in reach.

## 2022-07-17 NOTE — PROGRESS NOTES
Subjective:     Postpartum Day 2:  Delivery    The patient feels well. The patient denies emotional concerns. Pain is well controlled with current medications. The baby iswell. Baby is feeding via bottle - Similac with low iron. Urinary output is adequate. The patient is ambulating well. The patient is tolerating a normal diet. Flatus has been passed. Objective:        Blood pressure 130/86, pulse 80, temperature 97.7 °F (36.5 °C), temperature source Oral, resp. rate 16, last menstrual period 09/15/2021, SpO2 98 %, unknown if currently breastfeeding. No intake/output data recorded. Lab Results   Component Value Date    WBC 13.4 (H) 2022    HGB 9.1 (L) 07/15/2022    HCT 27.5 (L) 07/15/2022    MCV 88.7 2022     2022       General:    alert, appears stated age, and cooperative   Lungs:    Lochia:  appropriate   Uterine    firmnontender   Incision:  healing well, no significant drainage, no dehiscence, no significant erythema   DVT Evaluation:  No evidence of DVT seen on physical exam.  Negative Torito's sign.       Results for orders placed or performed during the hospital encounter of 22   Comprehensive Metabolic Panel   Result Value Ref Range    Sodium 135 132 - 146 mmol/L    Potassium 4.1 3.5 - 5.0 mmol/L    Chloride 105 98 - 107 mmol/L    CO2 18 (L) 22 - 29 mmol/L    Anion Gap 12 7 - 16 mmol/L    Glucose 90 74 - 99 mg/dL    BUN 7 6 - 20 mg/dL    CREATININE 0.5 0.5 - 1.0 mg/dL    GFR Non-African American >60 >=60 mL/min/1.73    GFR African American >60     Calcium 8.6 8.6 - 10.2 mg/dL    Total Protein 6.3 (L) 6.4 - 8.3 g/dL    Albumin 3.2 (L) 3.5 - 5.2 g/dL    Total Bilirubin <0.2 0.0 - 1.2 mg/dL    Alkaline Phosphatase 189 (H) 35 - 104 U/L    ALT 9 0 - 32 U/L    AST 12 0 - 31 U/L   CBC   Result Value Ref Range    WBC 13.4 (H) 4.5 - 11.5 E9/L    RBC 3.63 3.50 - 5.50 E12/L    Hemoglobin 10.5 (L) 11.5 - 15.5 g/dL    Hematocrit 32.2 (L) 34.0 - 48.0 %    MCV 88.7 80.0 - 99.9 fL    MCH 28.9 26.0 - 35.0 pg    MCHC 32.6 32.0 - 34.5 %    RDW 13.9 11.5 - 15.0 fL    Platelets 896 517 - 532 E9/L    MPV 11.4 7.0 - 12.0 fL   DRUG SCREEN MULTI URINE   Result Value Ref Range    Amphetamine Screen, Urine NOT DETECTED Negative <1000 ng/mL    Barbiturate Screen, Ur NOT DETECTED Negative < 200 ng/mL    Benzodiazepine Screen, Urine NOT DETECTED Negative < 200 ng/mL    Cannabinoid Scrn, Ur NOT DETECTED Negative < 50ng/mL    Cocaine Metabolite Screen, Urine NOT DETECTED Negative < 300 ng/mL    Opiate Scrn, Ur NOT DETECTED Negative < 300ng/mL    PCP Screen, Urine NOT DETECTED Negative < 25 ng/mL    Methadone Screen, Urine NOT DETECTED Negative <300 ng/mL    Oxycodone Urine NOT DETECTED Negative <100 ng/mL    FENTANYL SCREEN, URINE NOT DETECTED Negative <1 ng/mL    Drug Screen Comment: see below    Hemoglobin and hematocrit, blood   Result Value Ref Range    Hemoglobin 9.1 (L) 11.5 - 15.5 g/dL    Hematocrit 27.5 (L) 34.0 - 48.0 %   TYPE AND SCREEN   Result Value Ref Range    ABO/Rh O POS     Antibody Screen NEG        Assessment:     Status post  section. POD #  2Doing well postoperatively. male  Principal Problem:    Term pregnancy  Resolved Problems:    * No resolved hospital problems. *    Plan:     Discharge home with standard precautions and return to clinic in 1 week. Ambulate as tolerated  Continue PNV po daily. Pain meds as needed.   Iron sulfate p.o. daily    Nicki Barry MD,MLISA.  2022  9:46 AM        Tripp Martel  2000  [id number]

## 2022-07-18 NOTE — PROGRESS NOTES
Assumed care of patient at this time. Report received from previous RN. Patient to be discharged as soon as ride arrives, baby will be staying.

## 2022-07-19 ENCOUNTER — HOSPITAL ENCOUNTER (OUTPATIENT)
Dept: PSYCHIATRY | Age: 22
Setting detail: THERAPIES SERIES
Discharge: HOME | End: 2022-07-19
Payer: COMMERCIAL

## 2022-07-19 PROCEDURE — H0015 ALCOHOL AND/OR DRUG SERVICES: HCPCS

## 2022-07-19 NOTE — GROUP NOTE
Start Time: 56 AM  End Time: 12:00 PM  Number of participants:7  Type of group: Psychoeducation  Mode of intervention: Education, Support, Socialization, Exploration, Clarifying, Problem-solving, Media, Confrontation, Limit-setting, and Reality-testing  Topic: Disease Model of Substance Addiction. Objective: Increase clients awareness and provide education to prevent relapse. Note: Client actively participated in the group session. She was assigned to view education \" The Disease of Addiction\". Client acknowledged his own loss of control and verbalized 1-2 dangerous situations she placed her life in to get drugs and she described to the group how she got shot going to buy drugs and she expressed gratefulness to be alive and she verbalized motivation to continue her sobriety. Status after intervention:Improved  Participation level:  Active Listener and Interactive  Participation Quality: Appropriate, Attentive, and Sharing  Speech: normal  Thought Process/Content:Logical  Mood/Affect: brightens  Self report: None Reported  Response to learning: Able to verbalize current knowledge/experience, Able to verbalize/acknowledge new learning, Able to retain information, Capable of insight, Able to change behavior, and Progressing to goal  Discipline Responsible: /Counselor  Electronically signed by Kristyn Fuller [place] [address] on 0/57/4779 at 2:51 PM

## 2022-07-19 NOTE — GROUP NOTE
Date: 7-19-22  Start Time:9:00  End Time: 10:15  Type of group: psychotherapy  Number of participants: 7  Mode of intervention: existential factors, altruism, instillation of hope, universality, group cohesiveness and interpersonal learning. Topic: exploring stressors which lead to use  Objective: To be pro active under stressful situations    Note: Yumiko Farmer recently gave birth and is back for group. She describer how she began her usage by associating with a male peer who used and she eventually joined him. He was supportive of others who were struggling in group. We focused on how to avoid negative interactions and to keep self calm to avoid emotional escalation. Status after intervention:Improved  Participation level: Active Listener and Interactive  Participation Quality: Appropriate, Attentive, Sharing, and Supportive  Speech: normal  Thought Process/Content:Logical  Mood/Affect: calm and congruent  Self report: None Reported  Response to learning: Able to verbalize current knowledge/experience, Able to verbalize/acknowledge new learning, Able to retain information, Capable of insight, Able to change behavior, and Progressing to goal  Discipline Responsible: /Counselor    [x] Check in completed and reviewed. Intervention required.  no     Electronically signed by ROBERT Yoon, ANDREE on 7/19/2022 at 2:40 PM

## 2022-07-21 ENCOUNTER — HOSPITAL ENCOUNTER (OUTPATIENT)
Dept: PSYCHIATRY | Age: 22
Setting detail: THERAPIES SERIES
Discharge: HOME | End: 2022-07-21
Payer: COMMERCIAL

## 2022-07-21 NOTE — CARE COORDINATION
Peer reached out to [facility] to notify about absence.   Peer stated \"I've been trying to call and nobody is answering, and I've been trying to find a ride for the last hour\"  Huma Rao notified

## 2022-07-25 ENCOUNTER — HOSPITAL ENCOUNTER (OUTPATIENT)
Dept: PSYCHIATRY | Age: 22
Setting detail: THERAPIES SERIES
Discharge: HOME | End: 2022-07-25
Payer: COMMERCIAL

## 2022-07-25 PROCEDURE — H0015 ALCOHOL AND/OR DRUG SERVICES: HCPCS

## 2022-07-25 NOTE — GROUP NOTE
Date: 7-25-22  Start Time: 9:00  End Time: 10:15  Number of participants: 8  Type of group: Psychotherapy  Mode of intervention: existential factors, altruism, instillation of hope, interpersonal learning, group cohesion, and socialization. Topic: supporting each other in sobriety  Objective: to build a sober support network      Note: RADHA noted her past use history. 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 At one point she shut down and became resistant to the group. Status after intervention:Decompensated  Participation level: Minimal  Participation Quality: Resistant  Speech: mute  Thought Process/Content:Logical  Mood/Affect: irritable and angry  Self report: None Reported  Response to learning: Resistant  Discipline Responsible: /Counselor    [x] Check in completed and reviewed. Intervention required.  No    Electronically signed by ROBERT Laboy, ANDREE on 7/25/2022 at 3:44 PM

## 2022-07-25 NOTE — PLAN OF CARE
[facility]- Level of Care Placement      []Admissions  [x]Continued Stay []Discharge/Transfer / Complication in [facility]:1/03/0822    Client Sticker      Level of Care Level 1      Outpatient Services Level 2.1   Intensive Outpatient Services(IOP) Level 2.5   Partial Hospitalization Services Level 3.1 CLINICALLY Managed Low-Intensity Residential Services Level 3.3  CLINICALLY Managed Population- Specific High- Intensity Residential Services Level 3.5  CLINICALLY Managed High Intensive Residential Services Level 3.7  MEDICALLY Monitored Intensive Inpatient Services Level 4  MEDICALLY Managed Intensive Inpatient Services   Dimension 1  Acute Intoxication and/or Withdrawal Potential [x] Not experiencing significant withdrawal    [] Minimal  risk of severe  withdrawal [] Minimal  risk of severe  withdrawal    [] Manageable  at Level 2-WM [] Moderate  risk of severe withdrawal    [] Manageable at Level 2-WM [] No withdrawal risk or minimal or stable withdrawal     [] Concurrently receiving Level -WM or Level 2-WM services [] Minimal risk of severe withdrawal    [] If withdrawal is present, manageable at Level 3. 2-WM  [] Minimal risk of severe withdrawal    [] If withdrawal is present manageable at Level 3. 2-WM [] High risk of withdrawal, but manageable at Level  3.7-WM and does not require  full resources of a licensed hospital [] At high risk of withdrawal and requires Level 4-WM and full resources of licensed hospital    COMMENTS:           Dimension 2   Biomedical Conditions and Complications  (BMC/C) [] None or very stable    [x] Receiving concurrent medical monitoring  [] None or not a distraction from treatment    [] Problems are manageable at Level 2.1 [] None or not sufficient to distract from treatment    [] Problems are manageable at  Level 2.5 [] None or stable    [] Receiving concurrent medical monitoring  [] None or stable    [] Receiving concurrent medical [place] Date:7/25/2022         Level of Care Level 1  Outpatient   Services Level 2.1  Intensive  Outpatient  Services Level 2.5  Partial Hospitalization Services Level 3.1  CLINICALLY Managed Low-intensity Residential Services Level 3.3  CLINICALLY Managed Population- Specific High- Intensity Residential Services Level 3.5  CLINICALLY Managed High-Intensive Residential Services Level 3.7  MEDICALLY Monitored Intensive Inpatient Services Level 4  MEDICALLY Managed Intensive Inpatient Services   Dimension 4  Readiness  To  Change [] Ready for recovery but needs motivating and monitoring strategies to strengthen readiness    []Needs ongoing monitoring and disease management    [] High severity in this dimension but not in other dimensions. Needs Level 1 motivational enhancement strategies   [x] Has variable engagement in treatment, ambivalence, or lack of awareness of substance use or mental health problems and requires structured program several times/wk. to promote progress through stages of change [] Has poor engagement in treatment, significant ambivalence, or lack of awareness of substance use or mental health problems, requires near daily structured program or intensive engagement to promote progress through stages of change [] Open to recovery, but needs structured environment to maintain therapeutic gains [] Has little awareness & needs interventions at Level 3.3 to engage & stay in treatment.     [] If there is high severity in this dimension, but not in any other dimension, motivational enhancement strategies should be provided in Level 1 [] Has marked difficulty with, or opposition to treatment with dangerous consequences    [] If there is high severity in this dimension, but not in any other dimension, motivational enhancement strategies should be provided in Level 1 [] Low interest in treatment and impulse control is poor despite negative consequences;  needs motivating strategies only safely available in Recovery environment is not supportive  but with structure and support, the client can cope [] Recovery environment is not supportive, but with structure and support and relief from the home environment, client can cope [] Environment    is dangerous, but recovery is achievable if Level 3.1 24-hour structure is available  [] Environment is dangerous and  client needs 24-hour structure to learn to cope [] Environment is dangerous, and the client lacks skills to cope outside of a  highly structured 24-hour setting   [] Environment is dangerous, and the client lacks skills to cope outside of a  highly structured 24-hour setting [] Problems in this dimension do not qualify the client for Level 4 services    [] If the client's only severity is in Dimension 4,5, and/or 6 without high severity in Dimensions 1,2, and/or 3, then client is not qualified for Level 4   COMMENTS:               Electronically signed by Kristyn Lopez Coleman 320 on 0/96/0055 at 5:20 PM

## 2022-07-25 NOTE — CARE COORDINATION
PRS met with peer to discuss her upcoming schedule. Emmy Carrier expressed anxiety surrounding her schedule, sharing \"It like just feels really overwhelming, and I don't really have a set schedule with the baby yet, but I mean they're working with me. It just seems like so much. \" Peers affect was semi tearful though she did not actually cry. PRS supported Chaparrita with affirmations and supportive/empathetic feedback. Peer was receptive. PRS and Emmy Carrier will be meeting this week to make a list/ schedule to help lighten the mental load.

## 2022-07-25 NOTE — CARE COORDINATION
PRS talked with peer, when checking in yesterday 7/23 peer shared it was her birth fathers birthday. He recently passed from an overdose in November 2021. Peer shared she still \"wasn't feeling the best'  PRS suggested peer make a gratitude list, expanding on 1 item (writing a few sentences), and write out 3 affirmations. PRS provided education on affirmations and the benefits of utilizing both affirmations and gratitude lists. Peer was extremely receptive and thanked PRS.
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
## 2022-07-26 ENCOUNTER — HOSPITAL ENCOUNTER (OUTPATIENT)
Dept: PSYCHIATRY | Age: 22
Setting detail: THERAPIES SERIES
Discharge: HOME | End: 2022-07-26
Payer: COMMERCIAL

## 2022-07-26 PROCEDURE — H0015 ALCOHOL AND/OR DRUG SERVICES: HCPCS

## 2022-07-26 PROCEDURE — 80305 DRUG TEST PRSMV DIR OPT OBS: CPT
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
## 2022-07-26 NOTE — GROUP NOTE
Number of participants:7  Type of group: Psychotherapy  Mode of intervention: Education, Support, Socialization, Exploration, Clarifying, Problem-solving, Confrontation, Limit-setting, and Reality-testing  Topic: Triggers  Objective:  Explore psychological set up for relapse to gain awareness and strengthen recovery skills for sobriety. Note: Client actively participated in the group session. She was assigned to identify triggers that can lead to potential relapse. Client verbalized low self worth as a trigger and she shared how family members make her feel bad by saying mean things to her and she processed paying the tape through  and calling sober support for help. Ramsey' insight was presented very well and her motivation to stay sober is improving. Status after intervention:Improved  Participation level: Active Listener and Interactive  Participation Quality: Appropriate, Attentive, Sharing, and Supportive  Speech: normal  Thought Process/Content:Logical  Mood/Affect: brightens  Self report: None Reported  Response to learning: Able to verbalize current knowledge/experience, Able to verbalize/acknowledge new learning, Able to retain information, Capable of insight, Able to change behavior, and Progressing to goal  Discipline Responsible: /Counselor    [x] Check in completed and reviewed. Intervention required.  No    Electronically signed by Kristyn Santos 320 on 4/84/4187 at 3:20 PM

## 2022-07-28 ENCOUNTER — HOSPITAL ENCOUNTER (OUTPATIENT)
Dept: PSYCHIATRY | Age: 22
Setting detail: THERAPIES SERIES
Discharge: HOME | End: 2022-07-28
Payer: COMMERCIAL

## 2022-07-28 PROCEDURE — H0015 ALCOHOL AND/OR DRUG SERVICES: HCPCS

## 2022-07-28 ASSESSMENT — PATIENT HEALTH QUESTIONNAIRE - PHQ9: SUM OF ALL RESPONSES TO PHQ QUESTIONS 1-9: 12

## 2022-07-28 NOTE — FLOWSHEET NOTE
PHQ9 completed score=12 Client is in treatment through [facility].     Electronically signed by ROBERT Francisco, OFEW on 7/28/2022 at 11:33 AM

## 2022-07-28 NOTE — GROUP NOTE
FXCE:[id number]  Start Time: 9:00  End time: 10:15  Number of participants: 7  Type of group: Psychotherapy  Mode of intervention: Existential factors. altruism, instillation of hope, interpersonal learning, socialization and imparting of information. Topic: self disclosure  Objective : exploring our past      Note: RADHA noted her past and her life with her mother, which may have not been the best in all circumstances. She has been coming without missing. She is invested in her treatment. She was urged to see a psychiatrist as she continues to struggle with her mental health. She does see a therapist for her her [facility]. She was open for psychiatric case management. She had good participation. She was given information where she can attend sober meetings by her peers and was encouraged to attend over the weekend. Status after intervention:Improved  Participation level: Active Listener and Interactive  Participation Quality: Appropriate, Attentive, and Sharing  Speech: normal  Thought Process/Content:Logical  Mood/Affect: calm and congruent  Self report: None Reported  Response to learning: Able to verbalize current knowledge/experience, Able to verbalize/acknowledge new learning, Able to retain information, Capable of insight, Able to change behavior, and Progressing to goal  Discipline Responsible: /Counselor    [x] Check in completed and reviewed. Intervention required.  No    Electronically signed by ROBERT Albarran, ANDREE on 7/28/2022 at 3:16 PM

## 2022-07-28 NOTE — GROUP NOTE
Start Time: 56 AM  End Time : 12:00 PM   Number of participants:5  Type of group: Recovery  Mode of intervention: Education, Support, Socialization, Exploration, Clarifying, Problem-solving, Confrontation, Limit-setting, and Reality-testing  Topic: Sprituality  Objective: Recognize the benefits of gratitude in recovery and develop skills to increase appreciation , love , and gratitude in relationships to support recovery process. Note: Client actively participated in the group session. She was presented with information and education from spiritual care related to love, gratitude, and love in relationships. Client reported that she recognizes that she needs to be more appreciative to the people in her life that are supportive and helpful to her. Client expressed gratitude for her sobriety and and intention to show people that she is appreciative of support in her relationships at home. Status after intervention:Improved  Participation level:  Active Listener and Interactive  Participation Quality: Appropriate, Attentive, Sharing, and Supportive  Speech: normal  Thought Process/Content:Logical  Mood/Affect: brightens  Self report: None Reported  Response to learning: Able to verbalize current knowledge/experience, Able to verbalize/acknowledge new learning, Able to retain information, Capable of insight, Able to change behavior, and Progressing to goal  Discipline Responsible: /Counselor    Electronically signed by Emelia Adams on 3/80/7800 at 1:21 PM

## 2022-07-29 NOTE — FLOWSHEET NOTE
ARIEL spoke with Anderson Hand yesterday to advocate for the patient to have psychiatric case management. GT in chart and emailed.     Electronically signed by ROBERT Roman, ANDREE on 7/29/2022 at 10:25 AM

## 2022-08-01 ENCOUNTER — HOSPITAL ENCOUNTER (OUTPATIENT)
Dept: PSYCHIATRY | Age: 22
Setting detail: THERAPIES SERIES
Discharge: HOME | End: 2022-08-01
Payer: COMMERCIAL

## 2022-08-01 PROCEDURE — H0015 ALCOHOL AND/OR DRUG SERVICES: HCPCS
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
## 2022-08-02 ENCOUNTER — HOSPITAL ENCOUNTER (OUTPATIENT)
Dept: PSYCHIATRY | Age: 22
Setting detail: THERAPIES SERIES
Discharge: HOME | End: 2022-08-02
Payer: COMMERCIAL

## 2022-08-02 PROCEDURE — H0015 ALCOHOL AND/OR DRUG SERVICES: HCPCS

## 2022-08-02 NOTE — GROUP NOTE
Date: 8-2-22  Start Time: 10:30  End Time: 12:00  Type of group: Relapse prevention  Number of participants: 8  Mode of intervention: instillation of hope, universality, imparting information, interpersonal learning, group cohesion, and existential factors. Topic: sober support  Objective: ways to maintain sober support    Note: CIARAN has made vital changes in her life since before she had been in treatment. Her quest has been for a sober life and to eventually get custody of her children. She has plans to attend sober meetings this weekend and to maintain avoidance from negative situations and people. Status after intervention:Improved  Participation level: Active Listener and Interactive  Participation Quality: Appropriate, Attentive, and Sharing  Speech: normal  Thought Process/Content:Logical  Mood/Affect: calm and congruent  Self report: None Reported  Response to learning: Able to verbalize current knowledge/experience, Able to verbalize/acknowledge new learning, Able to retain information, Capable of insight, Able to change behavior, and Progressing to goal  Discipline Responsible: /Counselor    [x] Check in completed and reviewed. Intervention required.  No    Electronically signed by ROBERT Lucas, OFEW on 8/2/2022 at 3:34 PM

## 2022-08-02 NOTE — GROUP NOTE
Date: 22  Start Time: 9:00  End Time: 10:15  Number of participants: 8  Type of group: Psychotherapy  Mode of intervention: existential factors, catharsis, interpersonal learning, instillation of hope, socialization, and imparting of information. Topic: Spiritually  Objective : Has spirituality helped you through your sobriety. Note: RADHA noted her sonya in God has helped her through difficult times and helps her to make the right decisions in her life. Some of her family has been helpful towards her. She has been able to process situations in her life now that she has been sober. She was referred to the ER center here because she was complaining of high pain regarding her recent . Status after intervention:Improved  Participation level: Active Listener and Interactive  Participation Quality: Appropriate, Attentive, and Sharing  Speech: normal  Thought Process/Content:Logical  Mood/Affect: calm and congruent  Self report: None Reported  Response to learning: Able to verbalize current knowledge/experience, Able to verbalize/acknowledge new learning, Able to retain information, Capable of insight, Able to change behavior, and Progressing to goal  Discipline Responsible: /Counselor    [x] Check in completed and reviewed. Intervention required.  No    Electronically signed by ROBERT Landry, ANDREE on 2022 at 2:39 PM

## 2022-08-04 ENCOUNTER — HOSPITAL ENCOUNTER (OUTPATIENT)
Dept: PSYCHIATRY | Age: 22
Setting detail: THERAPIES SERIES
Discharge: HOME | End: 2022-08-04
Payer: COMMERCIAL

## 2022-08-04 PROCEDURE — H0015 ALCOHOL AND/OR DRUG SERVICES: HCPCS

## 2022-08-04 PROCEDURE — 80305 DRUG TEST PRSMV DIR OPT OBS: CPT

## 2022-08-04 NOTE — GROUP NOTE
Start Time: 56 AM  End Time: 12:00 PM  Number of participants:8  Type of group: Psycho education  Mode of intervention: Education, Support, Socialization, Exploration, Clarifying, Problem-solving, Confrontation, Limit-setting, and Reality-testing  Topic: DSM- 5- Use/ Abuse/ Dependence  Objective:  Explore criteria clients meet related to use  , abuse, dependence. Note: Client actively participated in the group session. She was presented with information from DSM-5 related to substance use disorders. Client related to aspects of abuse and dependence , and acknowledged how substance use has harmed her life and hurt her family members. Client verbalizes a desire to stay sober and not want to return to the \" stress \" of substance addiction. Status after intervention:Improved  Participation level: Active Listener and Interactive  Participation Quality: Appropriate, Attentive, Sharing, and Supportive  Speech: normal  Thought Process/Content:Logical  Mood/Affect: brightens  Self report: None Reported  Response to learning: Able to verbalize current knowledge/experience, Able to verbalize/acknowledge new learning, Able to retain information, Capable of insight, and Able to change behavior  Discipline Responsible: /Counselor    [x] Check in completed and reviewed. Intervention required.  No    Electronically signed by Kristyn Otto [place]ley 320 on 7/8/0035 at 3:17 PM

## 2022-08-04 NOTE — FLOWSHEET NOTE
SW and client spoke with [facility] to have them set the client up for a psychiatrist. Client reports depression, she denies SI, she notes her sleep has been poor. She received word today she will be getting an apartment. We counseled on her making up for sober meetings as she has only two.     Electronically signed by ROBERT Dodson, ANDREE on 8/4/2022 at 11:52 AM

## 2022-08-04 NOTE — GROUP NOTE
Date: 8-4-22  Start Time: 9:00  End Time: 10:15  Number of participants: 9  Type of group: psychotherapy  Mode of intervention: instillation of hope, universality, imparting of information,altruism, interpersonal learning and existential factors. Topic: dealing with crisis  Objective: using the group for sober support    Note: Today's group focused on RADHA. She is struggling and having cravings to use because yesterday she had a visit with her older son. She felt pressured because her sister, who has custody of the child kept pressuring her for a deadline to end the visit. She felt very frustrated and the harjit of her visit was all but taken away. Since then she has had urges to use. The group processed her situation and encouraged her to continue her sobriety. She noted she felt relived after she group concluded. Status after intervention:Improved  Participation level: Active Listener and Interactive  Participation Quality: Appropriate, Attentive, and Sharing  Speech: normal  Thought Process/Content:Logical  Mood/Affect: sad , brightens, anxious, depressed, and spontaneous  Self report: None Reported  Response to learning: Able to verbalize current knowledge/experience, Able to verbalize/acknowledge new learning, Able to retain information, Capable of insight, Able to change behavior, and Progressing to goal  Discipline Responsible: /Counselor    [x] Check in completed and reviewed. Intervention required.  No  Electronically signed by ROBERT Hill, ANDREE on 8/4/2022 at 2:55 PM

## 2022-08-08 ENCOUNTER — HOSPITAL ENCOUNTER (OUTPATIENT)
Dept: PSYCHIATRY | Age: 22
Setting detail: THERAPIES SERIES
Discharge: HOME | End: 2022-08-08
Payer: COMMERCIAL

## 2022-08-08 PROCEDURE — H0015 ALCOHOL AND/OR DRUG SERVICES: HCPCS

## 2022-08-08 NOTE — GROUP NOTE
Date: 8-8-22  Start Time: 9:00  End Time: 10:15  Number of participants: 9  Type of group: psychotherapy  Mode of intervention: existential factors, universality, imparting of information, altruism, socialization, and interpersonal learning. Topic: Helping each together in recovery  Objective: Working through issues to achieve sobriety      Note: Nafisa Peña had a very insightful group as she challenged others when are in the pre-contemplation stage of treatment. As a mother of two she verbalized the children will know if you are under the influence even if one uses out of their sight. Although Radha Be has lacked in attending sober meetings she lacks transportation. The group focused on zoom meetings once per week, attending a Gnosticist service or attending meetings near her home within walking distance. Status after intervention:Improved  Participation level: Active Listener and Interactive  Participation Quality: Appropriate, Attentive, and Sharing  Speech: normal  Thought Process/Content:Logical  Mood/Affect: calm and congruent  Self report: None Reported  Response to learning: Able to verbalize current knowledge/experience, Able to verbalize/acknowledge new learning, Able to retain information, Capable of insight, Able to change behavior, and Progressing to goal  Discipline Responsible: /Counselor    [x] Check in completed and reviewed. Intervention required.  no       Electronically signed by ROBERT Rosario LSW on 8/8/2022 at 2:24 PM

## 2022-08-09 ENCOUNTER — HOSPITAL ENCOUNTER (OUTPATIENT)
Dept: PSYCHIATRY | Age: 22
Setting detail: THERAPIES SERIES
Discharge: HOME | End: 2022-08-09
Payer: COMMERCIAL

## 2022-08-09 PROCEDURE — 80305 DRUG TEST PRSMV DIR OPT OBS: CPT

## 2022-08-09 PROCEDURE — H0015 ALCOHOL AND/OR DRUG SERVICES: HCPCS

## 2022-08-09 NOTE — GROUP NOTE
Start Time: 1030 AM  End Time: 1200 PM  Number of participants:9  Type of group: Recovery  Mode of intervention: Education, Support, Socialization, Exploration, Clarifying, Problem-solving, Confrontation, Limit-setting, and Reality-testing  Topic: Boundaries  Objective:  Explore and process the role boundaries has played in their lives and explore how to assert boundaries to aid sobriety. Note: Client actively participated in the group session. She was assigned to view and explore worksheet on boundaries. Client identified how in the past she did not maintain boundaries with using peers and she acknowledged how this lapse of not keeping limits led to substance use and negative behaviors that led to her children being removed from her care. Status after intervention:Improved  Participation level: Active Listener and Interactive  Participation Quality: Appropriate, Attentive, Sharing, and Supportive  Speech: normal  Thought Process/Content:Logical  Mood/Affect: brightens  Self report: None Reported  Response to learning: Able to verbalize current knowledge/experience, Able to verbalize/acknowledge new learning, Able to retain information, Capable of insight, and Able to change behavior  Discipline Responsible: /Counselor    [x] Check in completed and reviewed. Intervention required.  No    Electronically signed by Parish Naqvi on 0/7/6777 at 2:26 PM

## 2022-08-09 NOTE — PLAN OF CARE
[facility]- Level of Care Placement      []Admissions  [x]Continued Stay []Discharge/Transfer / Complication in [address] XIIV:5/4/7602    Client Sticker      Level of Care Level 1      Outpatient Services Level 2.1   Intensive Outpatient Services(IOP) Level 2.5   Partial Hospitalization Services Level 3.1 CLINICALLY Managed Low-Intensity Residential Services Level 3.3  CLINICALLY Managed Population- Specific High- Intensity Residential Services Level 3.5  CLINICALLY Managed High Intensive Residential Services Level 3.7  MEDICALLY Monitored Intensive Inpatient Services Level 4  MEDICALLY Managed Intensive Inpatient Services   Dimension 1  Acute Intoxication and/or Withdrawal Potential [x] Not experiencing significant withdrawal    [] Minimal  risk of severe  withdrawal [] Minimal  risk of severe  withdrawal    [] Manageable  at Level 2-WM [] Moderate  risk of severe withdrawal    [] Manageable at Level 2-WM [] No withdrawal risk or minimal or stable withdrawal     [] Concurrently receiving Level -WM or Level 2-WM services [] Minimal risk of severe withdrawal    [] If withdrawal is present, manageable at Level 3. 2-WM  [] Minimal risk of severe withdrawal    [] If withdrawal is present manageable at Level 3. 2-WM [] High risk of withdrawal, but manageable at Level  3.7-WM and does not require  full resources of a licensed hospital [] At high risk of withdrawal and requires Level 4-WM and full resources of licensed hospital    COMMENTS:           Dimension 2   Biomedical Conditions and Complications  (BMC/C) [] None or very stable    [x] Receiving concurrent medical monitoring  [] None or not a distraction from treatment    [] Problems are manageable at Level 2.1 [] None or not sufficient to distract from treatment    [] Problems are manageable at  Level 2.5 [] None or stable    [] Receiving concurrent medical monitoring  [] None or stable    [] Receiving concurrent medical monitoring  [] None or stable    [] Receiving concurrent medical monitoring [] Requires 24-hour medical monitoring  but not intensive treatment [] Requires 24-hour medical & nursing care and the full  resources of a licensed hospital   COMMENTS:           Dimension 3  Emotional,  Behavioral,  or Cognitive Conditions and Complications   (EBC/C) [] None or very stable    [x] Receiving concurrent mental health monitoring [] Mild severity  with potential to distract from recovery; needs monitoring [] Mild to moderate severity, with potential to distract from recovery, needs stabilization [] None or minimal; not distracting to recovery    [] If  stable, a    co-occurring capable program is appropriate    [] If not stable, a co-occurring enhanced program is required [] Mild to moderate severity; needs structure to focus on recovery. Treatment should be designed to address significant cognitive deficits     [] If  stable, a  co-occurring capable program is appropriate    [] If not stable, a co-occurring enhanced program is required [] Demonstrates repeated inability to control impulses or unstable & dangerous signs/ symptoms require stabilization. Other functional deficits require stabilization and 24-hr.  setting to prepare for community integration  & continuing care    [] Co-occurring enhanced setting is required for those with severe & chronic mental illness [] Moderate severity;  needs 24-hour   structured setting    [] If client has co-occurring mental disorder, requires concurrent mental health services in a medically monitored setting  [] Severe and unstable problems;  requires 24-hour psychiatric care  with concomitant addiction treatment   COMMENTS:             Staff: Pierre Ham  Date:8/9/2022                   [address] Placement      []Admissions  [x]Continued Stay []Discharge/Transfer / Complication in [facility] AND [facility] ILOH:2/8/5829    Client setting    [] If there is high severity in this dimension, but not in any other dimension, motivational enhancement strategies should be provided in Level 1 [] Problems in this dimension do not qualify the client for Level 4 services    [] If the client's only severity is in Dimension 4,5, and/or 6 without high severity in Dimensions 1,2, and/or 3, then client is not qualified for Level 4   COMMENTS:           Dimension 5  Relapse, Continued Use or Continued Problem Potential  [] Able to maintain abstinence or control use and/or addictive behaviors  and pursue recovery or motivational goals with minimal support [x] Intensification of addiction or mental health symptoms indicate high likelihood of relapse risk or continued use or continued problems without  close monitoring and support several times/wk.  [] Intensification of addiction or mental health symptoms, despite active participation in a Level 1 or 2.1 program, indicates high likelihood of relapse or continued use or continued problems without near-daily monitoring [] Understands relapse but needs structure to maintain therapeutic gains  [] Has little awareness and needs interventions available only at Level 3.3 to prevent continued use, with imminent dangerous consequences, because of cognitive deficits or  comparable dysfunction  [] Has no recognition  of the skills needed to prevent continued use,  with imminently dangerous  consequences [] Unable to control use, with imminently dangerous consequences,  despite active participation at less intensive levels of care [] Problems in this dimension do not qualify the client for Level 4 services    [] If the client's only severity is in Dimension 4,5, and/or 6 without high severity in Dimensions 1,2, and/or 3, then client is not qualified for Level 4   COMMENTS:           Dimension 6  Recovery/Living Environment []  Recovery environment is supportive and/or the client has skills to cope [x] Recovery environment is not supportive  but with structure and support, the client can cope [] Recovery environment is not supportive, but with structure and support and relief from the home environment, client can cope [] Environment    is dangerous, but recovery is achievable if Level 3.1 24-hour structure is available  [] Environment is dangerous and  client needs 24-hour structure to learn to cope [] Environment is dangerous, and the client lacks skills to cope outside of a  highly structured 24-hour setting   [] Environment is dangerous, and the client lacks skills to cope outside of a  highly structured 24-hour setting [] Problems in this dimension do not qualify the client for Level 4 services    [] If the client's only severity is in Dimension 4,5, and/or 6 without high severity in Dimensions 1,2, and/or 3, then client is not qualified for Level 4   COMMENTS:               Staff: Electronically signed by ROBERT Zapien, ANDREE on 8/9/2022 at 8:47 AM   Date:8/9/2022_

## 2022-08-09 NOTE — GROUP NOTE
Date: 8-9-22  Start Time: 9:00  End Time: 10:15  Type of group: psychotherapy  Number of participants: 10  Mode of intervention: instillation of hope, socialization, imparting of information interpersonal learning,altruism, and universality. Topic: exploring current sobriety  Objective: the strengthen the groups resolve regarding sobriety    Note: Radha Be had a good session as she shared her past use and supported others who were struggling with their sobriety. She continues to have trouble attending sober meetings an has only made one or two so far. She noted her sleep is poor and she will sleep during the day. She may see LW our nurse reaction for medications. Status after intervention:Improved  Participation level: Active Listener and Interactive  Participation Quality: Appropriate, Attentive, and Sharing  Speech: normal  Thought Process/Content:Logical  Mood/Affect: sad   Self report: None Reported  Response to learning: Able to verbalize current knowledge/experience, Able to verbalize/acknowledge new learning, Able to retain information, Capable of insight, Able to change behavior, and Progressing to goal  Discipline Responsible: /Counselor    [x] Check in completed and reviewed. Intervention required.  no     Electronically signed by ROBERT Rosario LSW on 8/9/2022 at 2:30 PM

## 2022-08-11 ENCOUNTER — HOSPITAL ENCOUNTER (OUTPATIENT)
Dept: PSYCHIATRY | Age: 22
Setting detail: THERAPIES SERIES
Discharge: HOME | End: 2022-08-11
Payer: COMMERCIAL

## 2022-08-11 PROCEDURE — H0015 ALCOHOL AND/OR DRUG SERVICES: HCPCS

## 2022-08-11 NOTE — GROUP NOTE
Start Time: 65 AM  End Time: 12:00 PM   Number of participants:10  Type of group: Recovery  Mode of intervention: Education, Support, Socialization, Exploration, Clarifying, Problem-solving, Confrontation, Limit-setting, and Reality-testing  Topic: Spirituality  Objective:  Explore concepts of spirituality and gain insight on how spirituality can aid recovery. Note: Client actively participated in the group session. Client was presented with information from spiritual care department related to sobriety and higher power. Client actively listened and she was able to verbalize and process 2-4 ways a higher power can be helpful to maintaining sobriety. Status after intervention:Improved  Participation level: Active Listener and Interactive  Participation Quality: Appropriate, Attentive, Sharing, and Supportive  Speech: normal  Thought Process/Content:Logical  Mood/Affect: brightens  Self report: None Reported  Response to learning: Able to verbalize current knowledge/experience, Able to verbalize/acknowledge new learning, Able to retain information, Capable of insight, and Able to change behavior  Discipline Responsible: /Counselor    [x] Check in completed and reviewed. Intervention required.  No    Electronically signed by Sonny Dupont on 6/89/5892 at 3:17 PM

## 2022-08-11 NOTE — GROUP NOTE
Start Time: 900 AM  End Time :56 AM  Number of participants:9  Type of group: Psychotherapy  Mode of intervention: Education, Support, Socialization, Exploration, Clarifying, Problem-solving, Confrontation, Limit-setting, and Reality-testing  Topic: Substance exposure and impact on community  Objective:  Review substance experiences from a historic life perspective. Note: RADHA actively participated in group by disclosing past life experiences including the death of her father in November and how past substance use was used to avoid emotional content which was also mirrored in poor relationship choices. She recognized that withholding and avoiding emotions contributes to worse outcomes over time and appropriately disclosed and owned her feelings in group. Status after intervention:Improved  Participation level: Active Listener and Interactive  Participation Quality: Appropriate, Attentive, Sharing, and Supportive  Speech: normal  Thought Process/Content:Logical  Mood/Affect: anxious and tearful  Self report: None Reported  Response to learning: Able to verbalize current knowledge/experience, Able to verbalize/acknowledge new learning, Able to retain information, Capable of insight, Able to change behavior, and Progressing to goal  Discipline Responsible: /Counselor    [x] Check in completed and reviewed. Intervention required.  no       Electronically signed by Lino Lezama on 4/13/0313 at 2:15 PM

## 2022-08-15 ENCOUNTER — HOSPITAL ENCOUNTER (OUTPATIENT)
Dept: PSYCHIATRY | Age: 22
Setting detail: THERAPIES SERIES
Discharge: HOME | End: 2022-08-15
Payer: COMMERCIAL

## 2022-08-16 ENCOUNTER — HOSPITAL ENCOUNTER (OUTPATIENT)
Dept: PSYCHIATRY | Age: 22
Setting detail: THERAPIES SERIES
Discharge: HOME | End: 2022-08-16
Payer: COMMERCIAL

## 2022-08-16 PROCEDURE — 90853 GROUP PSYCHOTHERAPY: CPT

## 2022-08-16 PROCEDURE — H0015 ALCOHOL AND/OR DRUG SERVICES: HCPCS

## 2022-08-16 PROCEDURE — 80305 DRUG TEST PRSMV DIR OPT OBS: CPT

## 2022-08-16 NOTE — PROGRESS NOTES
Start Time: 0900  End Time:   1000  Number of participants: 8  Type of group: Nutrition for mental health  Mode of intervention: Education, Support, Socialization, Exploration, Clarifying, Problem-solving. Topic: Anxiety and depression nutritional group  Objective: To explore food to eat that can help with anxiety, depression and inflammation to help with mental health. Note: Client actively participated in the group session. Food was presented and information given related to anxiety, depression and inflammation. Client thoughts and feelings toward nutrition were explored. Client was receptive to information presented. Status after intervention:  Improved  Participation level; Active listener and interactive  Participation Quality:  Appropriate, Attentive and Sharing  Speech:  Normal  Thoughts Process/Content:  Logical  Mood/Affect:  Euthymic/ Bright  Self-Report: None Reported  Response to learning:  Able to verbalize current knowledge/experience. Able to verbalize new learning. Was able to retain information and capable of insight.   Discipline Responsible:  PMHNP

## 2022-08-16 NOTE — PLAN OF CARE
[facility]- Level of Care Placement      []Admissions  [x]Continued Stay [x] Complication in - relapse to Alcohol on 8/4/22 & 8/9/22 [place] Date:8/16/2022          Level of Care Level 1      Outpatient Services Level 2.1   Intensive Outpatient Services(IOP) Level 2.5   Partial Hospitalization Services Level 3.1 CLINICALLY Managed Low-Intensity Residential Services Level 3.3  CLINICALLY Managed Population- Specific High- Intensity Residential Services Level 3.5  CLINICALLY Managed High Intensive Residential Services Level 3.7  MEDICALLY Monitored Intensive Inpatient Services Level 4  MEDICALLY Managed Intensive Inpatient Services   Dimension 1  Acute Intoxication and/or Withdrawal Potential [x] Not experiencing significant withdrawal    [] Minimal  risk of severe  withdrawal [] Minimal  risk of severe  withdrawal    [] Manageable  at Level 2-WM [] Moderate  risk of severe withdrawal    [] Manageable at Level 2-WM [] No withdrawal risk or minimal or stable withdrawal     [] Concurrently receiving Level -WM or Level 2-WM services [] Minimal risk of severe withdrawal    [] If withdrawal is present, manageable at Level 3. 2-WM  [] Minimal risk of severe withdrawal    [] If withdrawal is present manageable at Level 3. 2-WM [] High risk of withdrawal, but manageable at Level  3.7- and does not require  full resources of a licensed hospital [] At high risk of withdrawal and requires Level 4- and full resources of licensed hospital    COMMENTS:           Dimension 2   Biomedical Conditions and Complications  (BMC/C) [] None or very stable    [x] Receiving concurrent medical monitoring  [] None or not a distraction from treatment    [] Problems are manageable at Level 2.1 [] None or not sufficient to distract from treatment    [] Problems are manageable at  Level 2.5 [] None or stable    [] Receiving concurrent medical monitoring  [] None or stable    [] Receiving concurrent medical monitoring  [] None or stable    [] Receiving concurrent medical monitoring [] Requires 24-hour medical monitoring  but not intensive treatment [] Requires 24-hour medical & nursing care and the full  resources of a licensed hospital   COMMENTS:           Dimension 3  Emotional,  Behavioral,  or Cognitive Conditions and Complications   (EBC/C) [] None or very stable    [x] Receiving concurrent mental health monitoring [x] Mild severity  with potential to distract from recovery; needs monitoring [] Mild to moderate severity, with potential to distract from recovery, needs stabilization [] None or minimal; not distracting to recovery    [] If  stable, a    co-occurring capable program is appropriate    [] If not stable, a co-occurring enhanced program is required [] Mild to moderate severity; needs structure to focus on recovery. Treatment should be designed to address significant cognitive deficits     [] If  stable, a  co-occurring capable program is appropriate    [] If not stable, a co-occurring enhanced program is required [] Demonstrates repeated inability to control impulses or unstable & dangerous signs/ symptoms require stabilization. Other functional deficits require stabilization and 24-hr.  setting to prepare for community integration  & continuing care    [] Co-occurring enhanced setting is required for those with severe & chronic mental illness [] Moderate severity;  needs 24-hour   structured setting    [] If client has co-occurring mental disorder, requires concurrent mental health services in a medically monitored setting  [] Severe and unstable problems;  requires 24-hour psychiatric care  with concomitant addiction treatment   COMMENTS:             Electronically signed by Kristyn Swift [place] [address] on 6/15/4358 at [address] Placement      []Admissions  [x]Continued Stay [x] Complication in [place]- relapse to Alcohol 8/4/22 & 8/9/22 Date:8/16/2022          Level of Care Level 1  Outpatient   Services Level 2.1  Intensive  Outpatient  Services Level 2.5  Partial Hospitalization Services Level 3.1  CLINICALLY Managed Low-intensity Residential Services Level 3.3  CLINICALLY Managed Population- Specific High- Intensity Residential Services Level 3.5  CLINICALLY Managed High-Intensive Residential Services Level 3.7  MEDICALLY Monitored Intensive Inpatient Services Level 4  MEDICALLY Managed Intensive Inpatient Services   Dimension 4  Readiness  To  Change [] Ready for recovery but needs motivating and monitoring strategies to strengthen readiness    []Needs ongoing monitoring and disease management    [] High severity in this dimension but not in other dimensions. Needs Level 1 motivational enhancement strategies   [x] Has variable engagement in treatment, ambivalence, or lack of awareness of substance use or mental health problems and requires structured program several times/wk. to promote progress through stages of change [] Has poor engagement in treatment, significant ambivalence, or lack of awareness of substance use or mental health problems, requires near daily structured program or intensive engagement to promote progress through stages of change [] Open to recovery, but needs structured environment to maintain therapeutic gains [] Has little awareness & needs interventions at Level 3.3 to engage & stay in treatment.     [] If there is high severity in this dimension, but not in any other dimension, motivational enhancement strategies should be provided in Level 1 [] Has marked difficulty with, or opposition to treatment with dangerous consequences    [] If there is high severity in this dimension, but not in any other dimension, motivational enhancement strategies should be provided in Level 1 [] Low interest in treatment and impulse control is poor despite negative consequences;  needs motivating strategies only safely

## 2022-08-16 NOTE — CARE COORDINATION
Today relapse's to alcohol on 8/4/22 & 8/9/22 were processed with counselor's and client was reluctant to admit to use and she had to be probed and prompted to admit her relapse, however client verbalized she relapsed and used alcohol due to poor peer association and feelings of loneliness. Client has lack of sober peer support and she is still ambivalent with increasing and obtaining sober support to aid her sobriety.     Electronically signed by Sonny Anna [place] on 3/08/3188 at 3:07 PM
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
## 2022-08-17 NOTE — FLOWSHEET NOTE
ARIEL left voice mail with Ms. Anu West to set up a meeting regarding patient.     Electronically signed by ROBERT Olvera, OFEW on 8/17/2022 at 3:55 PM

## 2022-08-18 ENCOUNTER — HOSPITAL ENCOUNTER (OUTPATIENT)
Dept: PSYCHIATRY | Age: 22
Setting detail: THERAPIES SERIES
Discharge: HOME | End: 2022-08-18
Payer: COMMERCIAL

## 2022-08-18 PROCEDURE — H0015 ALCOHOL AND/OR DRUG SERVICES: HCPCS

## 2022-08-18 NOTE — GROUP NOTE
Number of participants: 8  Type of group: Psychotherapy  Mode of intervention: Education, Support, Socialization, Exploration, Clarifying, Problem-solving, Limit-setting, and Reality-testing  Topic: Cravings, Triggers, Impact on success, Trauma component  Objective:  Sustain sobriety, develop resilience. Note: RADHA was active in group as seen in her disclosure of past trauma and recognition of past substance use as having an emotional restriction component. She revealed past trauma associated with housing instability (foster care) that was improved with a stable pet. She disclosed some animal related trauma in the death of her dog and being attacked by animals in the past. She recognized that animals bring up emotions in part because they are not logical beings and have no way of suppressing or avoiding their emotions. She recognized that she tends to focus more on the emotions of those around her as a defense mechanism and needs to work on accepting and maintaining her emotional awareness even in social situations. Status after intervention:Improved  Participation level: Active Listener and Interactive  Participation Quality: Appropriate, Attentive, Sharing, and Supportive  Speech: normal  Thought Process/Content:Logical  Mood/Affect: brightens  Self report: None Reported  Response to learning: Able to verbalize current knowledge/experience, Able to verbalize/acknowledge new learning, Able to retain information, Capable of insight, Able to change behavior, and Progressing to goal  Discipline Responsible: /Counselor  Danyell ALVARADO co-felicitator  [x] Check in completed and reviewed. Intervention required.  no

## 2022-08-22 ENCOUNTER — HOSPITAL ENCOUNTER (OUTPATIENT)
Dept: PSYCHIATRY | Age: 22
Setting detail: THERAPIES SERIES
Discharge: HOME | End: 2022-08-22
Payer: COMMERCIAL

## 2022-08-22 PROCEDURE — 80305 DRUG TEST PRSMV DIR OPT OBS: CPT

## 2022-08-22 PROCEDURE — H0015 ALCOHOL AND/OR DRUG SERVICES: HCPCS

## 2022-08-22 NOTE — PLAN OF CARE
[facility]- Level of Care Placement      []Admissions  [x]Continued Stay [x] Complication in [place]-Client has had three relapses Date:8/22/2022    Client Sticker      Level of Care Level 1      Outpatient Services Level 2.1   Intensive Outpatient Services(IOP) Level 2.5   Partial Hospitalization Services Level 3.1 CLINICALLY Managed Low-Intensity Residential Services Level 3.3  CLINICALLY Managed Population- Specific High- Intensity Residential Services Level 3.5  CLINICALLY Managed High Intensive Residential Services Level 3.7  MEDICALLY Monitored Intensive Inpatient Services Level 4  MEDICALLY Managed Intensive Inpatient Services   Dimension 1  Acute Intoxication and/or Withdrawal Potential [x] Not experiencing significant withdrawal    [] Minimal  risk of severe  withdrawal [] Minimal  risk of severe  withdrawal    [] Manageable  at Level 2-WM [] Moderate  risk of severe withdrawal    [] Manageable at Level 2-WM [] No withdrawal risk or minimal or stable withdrawal     [] Concurrently receiving Level -WM or Level 2-WM services [] Minimal risk of severe withdrawal    [] If withdrawal is present, manageable at Level 3. 2-WM  [] Minimal risk of severe withdrawal    [] If withdrawal is present manageable at Level 3. 2-WM [] High risk of withdrawal, but manageable at Level  3.7-WM and does not require  full resources of a licensed hospital [] At high risk of withdrawal and requires Level 4- and full resources of licensed hospital    COMMENTS:           Dimension 2   Biomedical Conditions and Complications  (BMC/C) [] None or very stable    [x] Receiving concurrent medical monitoring  [] None or not a distraction from treatment    [] Problems are manageable at Level 2.1 [] None or not sufficient to distract from treatment    [] Problems are manageable at  Level 2.5 [] None or stable    [] Receiving concurrent medical monitoring  [] None or stable    [] Receiving concurrent medical monitoring  [] None or stable    [] Receiving concurrent medical monitoring [] Requires 24-hour medical monitoring  but not intensive treatment [] Requires 24-hour medical & nursing care and the full  resources of a licensed hospital   COMMENTS:           Dimension 3  Emotional,  Behavioral,  or Cognitive Conditions and Complications   (EBC/C) [] None or very stable    [x] Receiving concurrent mental health monitoring [x] Mild severity  with potential to distract from recovery; needs monitoring [] Mild to moderate severity, with potential to distract from recovery, needs stabilization [] None or minimal; not distracting to recovery    [] If  stable, a    co-occurring capable program is appropriate    [] If not stable, a co-occurring enhanced program is required [] Mild to moderate severity; needs structure to focus on recovery. Treatment should be designed to address significant cognitive deficits     [] If  stable, a  co-occurring capable program is appropriate    [] If not stable, a co-occurring enhanced program is required [] Demonstrates repeated inability to control impulses or unstable & dangerous signs/ symptoms require stabilization. Other functional deficits require stabilization and 24-hr.  setting to prepare for community integration  & continuing care    [] Co-occurring enhanced setting is required for those with severe & chronic mental illness [] Moderate severity;  needs 24-hour   structured setting    [] If client has co-occurring mental disorder, requires concurrent mental health services in a medically monitored setting  [] Severe and unstable problems;  requires 24-hour psychiatric care  with concomitant addiction treatment   COMMENTS:             Electronically signed by Sharyle Fontana, [place] on 0/77/9425 at [address] [place] [address] J Placement      []Admissions  [x]Continued Stay []Discharge/Transfer / Complication in [place] Date:8/22/2022    Client Sticker      Level of Care Level 1  Outpatient   Services Level 2.1  Intensive  Outpatient  Services Level 2.5  Partial Hospitalization Services Level 3.1  CLINICALLY Managed Low-intensity Residential Services Level 3.3  CLINICALLY Managed Population- Specific High- Intensity Residential Services Level 3.5  CLINICALLY Managed High-Intensive Residential Services Level 3.7  MEDICALLY Monitored Intensive Inpatient Services Level 4  MEDICALLY Managed Intensive Inpatient Services   Dimension 4  Readiness  To  Change [] Ready for recovery but needs motivating and monitoring strategies to strengthen readiness    []Needs ongoing monitoring and disease management    [] High severity in this dimension but not in other dimensions. Needs Level 1 motivational enhancement strategies   [x] Has variable engagement in treatment, ambivalence, or lack of awareness of substance use or mental health problems and requires structured program several times/wk. to promote progress through stages of change [] Has poor engagement in treatment, significant ambivalence, or lack of awareness of substance use or mental health problems, requires near daily structured program or intensive engagement to promote progress through stages of change [] Open to recovery, but needs structured environment to maintain therapeutic gains [] Has little awareness & needs interventions at Level 3.3 to engage & stay in treatment.     [] If there is high severity in this dimension, but not in any other dimension, motivational enhancement strategies should be provided in Level 1 [] Has marked difficulty with, or opposition to treatment with dangerous consequences    [] If there is high severity in this dimension, but not in any other dimension, motivational enhancement strategies should be provided in Level 1 [] Low interest in treatment and impulse control is poor despite negative consequences;  needs motivating strategies only safely to cope [] Recovery environment is not supportive  but with structure and support, the client can cope [] Recovery environment is not supportive, but with structure and support and relief from the home environment, client can cope [] Environment    is dangerous, but recovery is achievable if Level 3.1 24-hour structure is available  [] Environment is dangerous and  client needs 24-hour structure to learn to cope [] Environment is dangerous, and the client lacks skills to cope outside of a  highly structured 24-hour setting   [] Environment is dangerous, and the client lacks skills to cope outside of a  highly structured 24-hour setting [] Problems in this dimension do not qualify the client for Level 4 services    [] If the client's only severity is in Dimension 4,5, and/or 6 without high severity in Dimensions 1,2, and/or 3, then client is not qualified for Level 4   COMMENTS:               Electronically signed by Kristyn Fuller [place] on 2/34/3819 at 5:12 PM

## 2022-08-22 NOTE — GROUP NOTE
Date: 8-22-22  Start Time: 9:00  End Time: 10:15  Number of participants: 8  Type of group: psychotherapy  Mode of intervention: existential factors, instillation of hope, altruism, socialization, group cohesiveness, and interpersonal learning. Topic: Rubin thought of the day: Those who do not recover are incapable of being honest with themselves. Objective: Critical thinking for those in recovery    Note: RADHA noted over the weekend her cousin had asked her to come over to drink. She told her no! Her cousin was upset, but Chris held her ground, she noted if she would have let her cousin come over she would have participated in drinking. She noted her sister was upset with her because  of Chaparrita's relapse two weeks ago, so she is with holding visitation with her son from her. This has her upset and she will consult CSB to get instructions on visitation. Status after intervention:Improved  Participation level: Active Listener and Interactive  Participation Quality: Appropriate, Attentive, and Sharing  Speech: normal  Thought Process/Content:Logical  Mood/Affect: brightens, anxious, and calm  Self report: None Reported  Response to learning: Able to verbalize current knowledge/experience, Able to verbalize/acknowledge new learning, Able to retain information, Capable of insight, Able to change behavior, and Progressing to goal  Discipline Responsible: /Counselor    [x] Check in completed and reviewed. Intervention required.  No    Electronically signed by ROBERT Francisco, ANDREE on 8/22/2022 at 3:55 PM

## 2022-08-22 NOTE — PLAN OF CARE
Client discussed in treatment team today. Present:  Kayli Fitzgerald LPC, Marycruz Banks [facility] Chandanaa Meyer [address] MSN, Dr. Arlyn Osman and Oscar Guerrero. Current Status: In IOP    Treatment goals:    [] Relapse prevention  [x] Increase sober support  [x] Increase attendance in sober support groups  [] Rogers effectively with cravings and urges  [] Other:     Recommendation: Kleber Fink needs to improve sober meetings, she had a relapse at least two weeks ago and has struggled from time to time. She will continue in IOP with a meeting with her CSB  soon.     Electronically signed by ROBERT Lucas, OFEW on 8/22/2022 at 4:54 PM

## 2022-08-22 NOTE — GROUP NOTE
10:45-12:00 08/22/2022  Jan camarena. Number of participants:7  Type of group: Relapse Prevention  Mode of intervention: Education, Support, Socialization, Exploration, Clarifying, Problem-solving, Limit-setting, and Reality-testing  Topic: Negative Emotions, Healthy & Wellness  Objective: Increase recovery skills, support resilience, reduce barriers to sharing content    Note: Note: RADHA was active and engaged in group. She showed progress by disclosing an increased ability to resist substance use in her social [place] (cousin came by with drinks, she refused them). She gained insight into how she needs to evaluate people in her social [place] for positive support by making small requests and gauging reactions. She understood that people who aren't supportive for little things are most likely not going to be there for her for big things and that she shouldn't waste her time and resources on those who don't give back. Status after intervention:Improved  Participation level: Active Listener and Interactive  Participation Quality: Appropriate, Attentive, Sharing, and Supportive  Speech: normal  Thought Process/Content:Logical  Mood/Affect: calm  Self report: None Reported  Response to learning: Able to verbalize current knowledge/experience, Able to verbalize/acknowledge new learning, Able to retain information, Capable of insight, and Able to change behavior  Discipline Responsible: /Counselor    [x] Check in completed and reviewed. Intervention required.  no

## 2022-08-23 ENCOUNTER — HOSPITAL ENCOUNTER (OUTPATIENT)
Dept: PSYCHIATRY | Age: 22
Setting detail: THERAPIES SERIES
Discharge: HOME | End: 2022-08-23
Payer: COMMERCIAL

## 2022-08-23 PROCEDURE — H0015 ALCOHOL AND/OR DRUG SERVICES: HCPCS

## 2022-08-23 NOTE — GROUP NOTE
Start Time: 900 AM  End Time : 65 AM  Number of participants:7  Type of group: Psychotherapy  Mode of intervention: Education, Support, Socialization, Exploration, Clarifying, Problem-solving, Confrontation, Limit-setting, and Reality-testing  Topic: Guilt & Shame   Objective: Explore the impact unresolved shame and guilt has on sobriety and the recovery process. Note: Client actively participated in the group session. She was assigned to identify and share experiences of feeling shame & guilt in active addiction, or currently. Client tearfully shared how she still feels feelings of shame because in active addiction she would ignore her children's needs because she wanted to \" get high\". Client shared today she is aware of how ignoring her children was a poor choice. Status after intervention:Improved  Participation level: Active Listener and Interactive  Participation Quality: Appropriate, Attentive, Sharing, and Supportive  Speech: normal  Thought Process/Content:Logical  Mood/Affect: brightens and tearful  Self report: None Reported  Response to learning: Able to verbalize current knowledge/experience, Able to verbalize/acknowledge new learning, Able to retain information, Capable of insight, Able to change behavior, and Progressing to goal  Discipline Responsible: /Counselor    [x] Check in completed and reviewed. Intervention required.  No    Electronically signed by Kristyn Noel [place] [address] on 2/81/4988 at 2:37 PM

## 2022-08-25 ENCOUNTER — HOSPITAL ENCOUNTER (OUTPATIENT)
Dept: PSYCHIATRY | Age: 22
Setting detail: THERAPIES SERIES
Discharge: HOME | End: 2022-08-25
Payer: COMMERCIAL

## 2022-08-25 PROCEDURE — 90853 GROUP PSYCHOTHERAPY: CPT

## 2022-08-25 PROCEDURE — H0015 ALCOHOL AND/OR DRUG SERVICES: HCPCS

## 2022-08-29 ENCOUNTER — HOSPITAL ENCOUNTER (OUTPATIENT)
Dept: PSYCHIATRY | Age: 22
Setting detail: THERAPIES SERIES
Discharge: HOME | End: 2022-08-29
Payer: COMMERCIAL

## 2022-08-29 ENCOUNTER — APPOINTMENT (OUTPATIENT)
Dept: PSYCHIATRY | Age: 22
End: 2022-08-29
Payer: COMMERCIAL

## 2022-08-29 PROBLEM — F33.1 MODERATE EPISODE OF RECURRENT MAJOR DEPRESSIVE DISORDER (HCC): Status: ACTIVE | Noted: 2022-08-29

## 2022-08-29 PROCEDURE — H0015 ALCOHOL AND/OR DRUG SERVICES: HCPCS

## 2022-08-29 PROCEDURE — 99214 OFFICE O/P EST MOD 30 MIN: CPT

## 2022-08-29 PROCEDURE — 80305 DRUG TEST PRSMV DIR OPT OBS: CPT

## 2022-08-29 RX ORDER — ESCITALOPRAM OXALATE 5 MG/1
5 TABLET ORAL NIGHTLY
Qty: 30 TABLET | Refills: 0 | Status: SHIPPED | OUTPATIENT
Start: 2022-08-29 | End: 2022-09-20 | Stop reason: HOSPADM

## 2022-08-29 RX ORDER — HYDROXYZINE PAMOATE 25 MG/1
25 CAPSULE ORAL 2 TIMES DAILY PRN
Qty: 28 CAPSULE | Refills: 0 | Status: SHIPPED | OUTPATIENT
Start: 2022-08-29 | End: 2022-09-12

## 2022-08-29 NOTE — BH NOTE
PSYCHIATRY ATTENDING NOTE:    CC: \"Not coping with my depression very well. \"    S: Patient being seen at [facility] in follow-up for polysubstance abuse and transferred to dual program for depression and anxiety. Met with patient to discuss progress with treatment. Patient reports that she has been depressed since she has been a young teenager. She was raised in [facility] and has a history of [place] for depression and suicide attempt by running into traffic at the age of 14years old. She has a history of cutting when she was [age] years of age and admits to abandonment issues as a teenager. She admits to using cocaine that started 10 months ago, last used two weeks ago. Also admits to alcohol use that started at the age of 14years old. Last used two weeks ago. She has had medication trials of Zoloft, Prozac and Lexapro in her past. She felt Lexapro was the most helpful when she took it consistently. She rates anxiety and depression as an #8 out of #10 currently. She endorses anhedonia and dysphoria but denies feeling helpless and hopeless. She states she has a lot to live for and \"I have a lot to loose. \" She states she has a new born baby and a two year old son but she does not have custody \"but I am trying to get them back. \" She reports that she relapsed on crack cocaine and alcohol two weeks ago but states she does not have any cravings currently. She denies withdrawal currently. \"I don't go out and seek it, I use when I am around others that use. \" She states she has told her relatives and friends they can not come around her if they are using. We did discuss her sleep and she says she wakes up several times in the night and does not feel rested during the day. Also discussed incorporating some type of regular physical activity into routine and education given on proper diet. No acute issues or concerns otherwise. Patient is not suicidal, homicidal, manic or psychotic. MSE:  Jasper Bateman female appears age. Pleasant, cooperative, forthcoming. Normal psychomotor activity, gait, strength, tone, eye contact. Mood depressed. Affect congruent. Speech clear. Thoughts organized. Content future-oriented. No suicidal or homicidal ideations. No paranoia, delusions or hallucinations. Orientation, concentration, recent and remote memory are grossly intact. Fund of knowledge fair. Language use fair. Insight and judgment fair. MEDICATIONS:  Lexapro 5 mg nightly (New)  Vistaril 25 mg bid prn (New)        ASSESSMENT:  Major Depression Disorder     Borderline Personality Disorder     Polysubstance Abuse    PLAN: Continue with treatment in dual IOP. Support, reassurance and psycho-education provided along with nutrition education. Will start Lexapro for depression and anxiety along with Vistaril for sleep and augment anxiety. Continue to monitor symptoms, side effects and response to medications. See back in one week.  Discuss with treatment team.                          Electronically signed by JAYCOB Michelle CNP on 8/29/2022 at 9:39 AM

## 2022-08-29 NOTE — PROGRESS NOTES
Start Time: 65 AM  End Time : 11:30 AM   Number of participants:6  Type of group: Recovery  Mode of intervention: Education, Support, Socialization, Exploration, Clarifying, Problem-solving, Confrontation, Limit-setting, and Reality-testing  Topic: Self- esteem   Objective: Explore how substance use fosters feelings of low self-esteem. Note: Client minimally participated in the group session. Client presented angry after processing positive drug screen for cocaine on 8/22/22. During the group sessions she was assigned to identify how her self-esteem has been impacted by substance use. Client admitted that her substance use has negatively impacted her self-esteem , however client attempted to blame others for her decisions that led to relapse and counselor attempted-direct client to focus on her own behaviors and client was resistant to information provided by peers and therapist. Client left the group session and did not return to the group. Status after intervention:Decompensated  Participation level: Interactive and Monopolizing  Participation Quality: Resistant  Speech: normal and loud  Thought Process/Content:Logical  Flight of ideas  Mood/Affect: anxious, fearful, irritable, and angry  Self report: None Reported  Response to learning: Resistant  Discipline Responsible: /Counselor    [x] Check in completed and reviewed. Intervention required.  No  Electronically signed by Sonny Dupont on 3/34/9332 at 4:13 PM

## 2022-08-29 NOTE — GROUP NOTE
08/29/2022 9:00a-10:30a Psychotherapy Group  Amarjit Christa Star  Number of participants:6  Type of group: Psychotherapy  Mode of intervention: Education, Support, Socialization, Exploration, Clarifying, Problem-solving, Limit-setting, and Reality-testing  Topic: Trauma. Emotional Communication, Cravings  Objective:  Improve emotional regulation via communication, develop appropriate coping skills, exploring impact of past trauma on present (emotional restriction)    Note: RADHA was active and engaged in treatment as seen in her disclosure of emotional variation (cried herself to sleep thinking of her children) and her leisure activities (visit to [address] [place] [address]) She gained insight into how trauma can contribute to emotional restriction and how emotional restriction often takes more energy than releasing emotions appropriately. She adopted grounding skills including checking her emotions against her surroundings and recognizing emotions as being from the present or past. She gained insight into how restricting emotions can make it more difficult to relax and gain restful sleep and how expressing her emotions actively can prevent intrusive emotions appearing out of the blue. Status after intervention:Improved  Participation level: Active Listener and Interactive  Participation Quality: Appropriate, Attentive, Sharing, and Supportive  Speech: normal  Thought Process/Content:Logical  Mood/Affect: calm  Self report: None Reported  Response to learning: Able to verbalize current knowledge/experience, Able to verbalize/acknowledge new learning, Able to retain information, Capable of insight, Able to change behavior, and Progressing to goal  Discipline Responsible: /Counselor    [x] Check in completed and reviewed. Intervention required.  no

## 2022-08-29 NOTE — PLAN OF CARE
[facility] [place]- Level of Care Placement      []Admissions  [x]Continued Stay [x] Complication in [place]- positive for cocaine- 8/22/22 Date:8/29/2022         Level of Care Level 1      Outpatient Services Level 2.1   Intensive Outpatient Services(IOP) Level 2.5   Partial Hospitalization Services Level 3.1 CLINICALLY Managed Low-Intensity Residential Services Level 3.3  CLINICALLY Managed Population- Specific High- Intensity Residential Services Level 3.5  CLINICALLY Managed High Intensive Residential Services Level 3.7  MEDICALLY Monitored Intensive Inpatient Services Level 4  MEDICALLY Managed Intensive Inpatient Services   Dimension 1  Acute Intoxication and/or Withdrawal Potential [x] Not experiencing significant withdrawal    [] Minimal  risk of severe  withdrawal [] Minimal  risk of severe  withdrawal    [] Manageable  at Level 2-WM [] Moderate  risk of severe withdrawal    [] Manageable at Level 2-WM [] No withdrawal risk or minimal or stable withdrawal     [] Concurrently receiving Level -WM or Level 2-WM services [] Minimal risk of severe withdrawal    [] If withdrawal is present, manageable at Level 3. 2-WM  [] Minimal risk of severe withdrawal    [] If withdrawal is present manageable at Level 3. 2-WM [] High risk of withdrawal, but manageable at Level  3.7-WM and does not require  full resources of a licensed hospital [] At high risk of withdrawal and requires Level 4- and full resources of licensed hospital    COMMENTS:           Dimension 2   Biomedical Conditions and Complications  (BMC/C) [] None or very stable    [x] Receiving concurrent medical monitoring  [] None or not a distraction from treatment    [] Problems are manageable at Level 2.1 [] None or not sufficient to distract from treatment    [] Problems are manageable at  Level 2.5 [] None or stable    [] Receiving concurrent medical monitoring  [] None or stable    [] Receiving concurrent medical 24-hour structured setting    [] If there is high severity in this dimension, but not in any other dimension, motivational enhancement strategies should be provided in Level 1 [] Problems in this dimension do not qualify the client for Level 4 services    [] If the client's only severity is in Dimension 4,5, and/or 6 without high severity in Dimensions 1,2, and/or 3, then client is not qualified for Level 4   COMMENTS:           Dimension 5  Relapse, Continued Use or Continued Problem Potential  [] Able to maintain abstinence or control use and/or addictive behaviors  and pursue recovery or motivational goals with minimal support [x] Intensification of addiction or mental health symptoms indicate high likelihood of relapse risk or continued use or continued problems without  close monitoring and support several times/wk.  [] Intensification of addiction or mental health symptoms, despite active participation in a Level 1 or 2.1 program, indicates high likelihood of relapse or continued use or continued problems without near-daily monitoring [] Understands relapse but needs structure to maintain therapeutic gains  [] Has little awareness and needs interventions available only at Level 3.3 to prevent continued use, with imminent dangerous consequences, because of cognitive deficits or  comparable dysfunction  [] Has no recognition  of the skills needed to prevent continued use,  with imminently dangerous  consequences [] Unable to control use, with imminently dangerous consequences,  despite active participation at less intensive levels of care [] Problems in this dimension do not qualify the client for Level 4 services    [] If the client's only severity is in Dimension 4,5, and/or 6 without high severity in Dimensions 1,2, and/or 3, then client is not qualified for Level 4   COMMENTS:           Dimension 6  Recovery/Living Environment []  Recovery environment is supportive and/or the client has skills to cope [x] Recovery environment is not supportive  but with structure and support, the client can cope [] Recovery environment is not supportive, but with structure and support and relief from the home environment, client can cope [] Environment    is dangerous, but recovery is achievable if Level 3.1 24-hour structure is available  [] Environment is dangerous and  client needs 24-hour structure to learn to cope [] Environment is dangerous, and the client lacks skills to cope outside of a  highly structured 24-hour setting   [] Environment is dangerous, and the client lacks skills to cope outside of a  highly structured 24-hour setting [] Problems in this dimension do not qualify the client for Level 4 services    [] If the client's only severity is in Dimension 4,5, and/or 6 without high severity in Dimensions 1,2, and/or 3, then client is not qualified for Level 4   COMMENTS:               Electronically signed by Kristyn Haq [place] 320 on 6/04/0039 at 4:17 PM

## 2022-08-30 ENCOUNTER — APPOINTMENT (OUTPATIENT)
Dept: PSYCHIATRY | Age: 22
End: 2022-08-30
Payer: COMMERCIAL

## 2022-08-30 ENCOUNTER — HOSPITAL ENCOUNTER (OUTPATIENT)
Dept: PSYCHIATRY | Age: 22
Setting detail: THERAPIES SERIES
Discharge: HOME | End: 2022-08-30
Payer: COMMERCIAL

## 2022-08-30 PROCEDURE — 90832 PSYTX W PT 30 MINUTES: CPT

## 2022-08-30 NOTE — CARE COORDINATION
Meeting occurred today from 12:00 PM to [id number] [id number].  Yadira Hammer , Counselor [facility]: Disucussed client's non - compliance issues and 4 relapses. Client was made aware that if her substance use continues she will have to be referred out to higher level of care and  discussed with client options of going into family depency court to assiat with regaining cutsidy of her children and the program. Mari Rodriguez verbalized willingness to go into family dependency program and stated she would stop her use of substances. Mari Rodriguez stated her last use of substances was (2) weeks ago and she was asked by counselor if she needed more care with being referred to residential treatment and client stated no and declined offer for residential treatment.     Electronically signed by Kristyn Germain [place] [address] on 1/27/4240 at 1:22 PM

## 2022-08-30 NOTE — PSYCHOTHERAPY
08/30/2022 11:15a-11:50a  Individual Treatment Session for [facility]. Counselor Angeles Fritz was active and engaged in one on session to introduce to dual program benefits and Reviewed impacts of recent turmoil including positive drug screen for cocaine and changes in reporting to children's services . Reviewed individual session rules and guidelines and signed documentation attesting understanding. Session focused on expectations of individual treatment including cognitive behavioral therapy and emotional expression. Danya Molina gained insight into the difference between emotional and logical communication and identified a potential codependency concern. She identified aspect of her past helping behaviors that allowed others to demand support and resources that isn't always reciprocated. She recognized that some positive displays of emotion may not lead to good outcomes and that she needs to address the facts and logic of the situation (Bigger the smile, sharper the teeth) She understood the benefits of evaluating her social [place] by asking that assistance or support that she has given be reciprocated and to establish appropriate boundaries against those who only take and don't give back. She understood the importance of reviewing her past and allowing past emotions to be expressed, she recognized that if she can hold past distress back it is a good sign that she can handle the emotion and it may be safe to release. She understood the benefits of giving others in her positive support system praise and permission to express negative emotion so they can develop dynamic support (Mother). She gained insight into how being forced together may make support more problematic as there is no personal choice involved and she has an easier time communicating with people who are not guaranteed to be around her.     Electronically signed by ROBERT Weaver on 8/30/2022 at 2:44 PM

## 2022-08-30 NOTE — CARE COORDINATION
Counselor processed positive drug screen for cocaine on 8/22/22 with [facility], [address]. She denied use and would not admit to use of this substance. Client was advised that she will placed on last chance contract due this being her 5th relapse and told if she has any further positive results she will be referred  out to a higher level of care.     Electronically signed by Kristyn Hanson [place] 320 on 7/74/1985 at 11:36 AM

## 2022-08-30 NOTE — GROUP NOTE
08/30/2022 9:00a-10:15a Psychotherapy Group  [facility]   [place] peer support add on (15min meditation)  Number of participants:7  Type of group: Psychotherapy  Mode of intervention: Education, Support, Socialization, Exploration, Clarifying, Problem-solving, Limit-setting, and Reality-testing  Topic: Sober Support Development, Group Rule Setting, Emotional regulation  Objective:  Reduce cravings and emotional triggers to substance use through improved emotional expression and processing. Note: RADHA was active and engaged in group although at times random. She showed good support by being positive towards cohorts and commenting specifically on how a member gave her good vibes by reminding her of a friendly fictional character. She gained insight into how improving her sober support system included not just adding positive sober members but also establishing boundaries to prevent active users from impacting her. She acknowledged that her recent positive drug screen was her fault for allowing herself to be around people in active use and took responsibility for it and recognized that leaving group the previous day may have done more to harm her recovery. Status after intervention:Improved  Participation level: Active Listener and Interactive  Participation Quality: Appropriate, Attentive, Sharing, and Supportive  Speech: normal  Thought Process/Content:Logical  Mood/Affect: brightens  Self report: None Reported  Response to learning: Able to verbalize current knowledge/experience, Able to verbalize/acknowledge new learning, Able to retain information, Capable of insight, Able to change behavior, and Progressing to goal  Discipline Responsible: /Counselor    [x] Check in completed and reviewed. Intervention required.  no

## 2022-09-01 ENCOUNTER — HOSPITAL ENCOUNTER (OUTPATIENT)
Dept: PSYCHIATRY | Age: 22
Setting detail: THERAPIES SERIES
Discharge: HOME | End: 2022-09-01
Payer: COMMERCIAL

## 2022-09-01 PROCEDURE — H0015 ALCOHOL AND/OR DRUG SERVICES: HCPCS

## 2022-09-01 NOTE — GROUP NOTE
09/01/2022 9:00-10:15a psychotherapy meeting  [address] [facility] Peer Support add on (15 minute Affirmation)  Number of participants: 6  Type of group: Psychotherapy  Mode of intervention: Education, Support, Socialization, Exploration, Clarifying, Problem-solving, and Limit-setting  Topic: Stress & Witnessed Emotion, Trigger Awareness (Holiday), Emotional Processing and Positive Self Regard  Objective:  Improve emotional regulation and increase resilience to emotional triggers to loss of emotional control including down/idle time. Note: RADHA was active and engaged in treatment as seen in her disclosure of her positive sober support system activities (overdose awareness, balloon release, and being a sober support for others) She gained insight into how processing past distress can help her to be aware of how far she has come and skills/strength she has gained. She disclosed a positive support encounter in which she realized that a person passing her support group was an active user and RADHA invited them to group and introduced them, she was proud of her utilization of boundary and expectation setting skills and recognized that the passerby's active use did not trigger cravings in herself. She recognized that [employer] can trigger cravings and memories of past use and dedicated herself to establishing new holiday traditions with sober members of her family while avoiding family members that triggered past use or have shown signs of sabotaging behaviors. Status after intervention:Improved  Participation level:  Active Listener and Interactive  Participation Quality: Appropriate, Attentive, Sharing, and Supportive  Speech: normal  Thought Process/Content:Logical  Mood/Affect: brightens  Self report: None Reported  Response to learning: Able to verbalize current knowledge/experience, Able to verbalize/acknowledge new learning, Able to retain information, Capable of insight, Able to change behavior, and Progressing to goal  Discipline Responsible: /Counselor    [x] Check in completed and reviewed. Intervention required.  no

## 2022-09-01 NOTE — PROGRESS NOTES
Group Note      Start Time : 8894 AM  End Time: 12:00 PM  Number of participants:6  Type of group: Recovery  Mode of intervention: Education, Support, Socialization, Exploration, Clarifying, Problem-solving, Media, Confrontation, Limit-setting, and Reality-testing  Topic: Self-Help Groups & Importance of sober fellowship  Objective:  Explore how self-help groups AA/NA reduce isolation and aid continued sobriety. Note: Client actively participated in the group session. He was assigned to view part II of the film  \" Love [id number] Not Enough\" . Client related to the film and shared her own experience of attending AA/NA self-help groups. Client was able to identify 2-4 ways self help groups can aid his sobriety and she shared how yesterday she went to a recovery rally and shared how good it made her feel to be with other sober people and to be able to help others struggling with the same problems. Status after intervention:Improved  Participation level: Active Listener and Interactive  Participation Quality: Appropriate, Attentive, Sharing, and Supportive  Speech: normal  Thought Process/Content:Logical  Mood/Affect: brightens and congruent  Self report: None Reported  Response to learning: Able to verbalize current knowledge/experience, Able to verbalize/acknowledge new learning, Able to retain information, Capable of insight, Able to change behavior, and Progressing to goal  Discipline Responsible: /Counselor    [x] Check in completed and reviewed. Intervention required.  No    Electronically signed by Sonny Harvey [place] on 8/9/6900 at 3:37 PM

## 2022-09-03 ENCOUNTER — HOSPITAL ENCOUNTER (EMERGENCY)
Age: 22
Discharge: HOME | End: 2022-09-03
Attending: EMERGENCY MEDICINE
Payer: COMMERCIAL

## 2022-09-03 VITALS
SYSTOLIC BLOOD PRESSURE: 116 MMHG | BODY MASS INDEX: 36.16 KG/M2 | RESPIRATION RATE: 16 BRPM | HEART RATE: 88 BPM | HEIGHT: 66 IN | DIASTOLIC BLOOD PRESSURE: 80 MMHG | WEIGHT: 225 LBS | OXYGEN SATURATION: 97 % | TEMPERATURE: 97.1 F

## 2022-09-03 DIAGNOSIS — A08.4 VIRAL GASTROENTERITIS: Primary | ICD-10-CM

## 2022-09-03 LAB
BACTERIA: ABNORMAL /HPF
BILIRUBIN URINE: NEGATIVE
BLOOD, URINE: NEGATIVE
CLARITY: CLEAR
COLOR: ABNORMAL
EPITHELIAL CELLS, UA: ABNORMAL /HPF
GLUCOSE URINE: NEGATIVE MG/DL
HCG(URINE) PREGNANCY TEST: NEGATIVE
KETONES, URINE: NEGATIVE MG/DL
LEUKOCYTE ESTERASE, URINE: ABNORMAL
NITRITE, URINE: NEGATIVE
PH UA: 5.5 (ref 5–9)
PROTEIN UA: NEGATIVE MG/DL
RBC UA: ABNORMAL /HPF (ref 0–2)
RENAL EPITHELIAL, UA: ABNORMAL /HPF
SPECIFIC GRAVITY UA: 1.01 (ref 1–1.03)
UROBILINOGEN, URINE: 0.2 E.U./DL
WBC UA: ABNORMAL /HPF (ref 0–5)

## 2022-09-03 PROCEDURE — 87491 CHLMYD TRACH DNA AMP PROBE: CPT

## 2022-09-03 PROCEDURE — 81001 URINALYSIS AUTO W/SCOPE: CPT

## 2022-09-03 PROCEDURE — 87591 N.GONORRHOEAE DNA AMP PROB: CPT

## 2022-09-03 PROCEDURE — 81025 URINE PREGNANCY TEST: CPT

## 2022-09-03 PROCEDURE — 99283 EMERGENCY DEPT VISIT LOW MDM: CPT

## 2022-09-03 RX ORDER — DICYCLOMINE HYDROCHLORIDE 10 MG/1
10 CAPSULE ORAL
Qty: 20 CAPSULE | Refills: 0 | Status: SHIPPED | OUTPATIENT
Start: 2022-09-03

## 2022-09-03 RX ORDER — LOPERAMIDE HYDROCHLORIDE 2 MG/1
2 CAPSULE ORAL 4 TIMES DAILY PRN
Qty: 10 CAPSULE | Refills: 0 | Status: SHIPPED | OUTPATIENT
Start: 2022-09-03 | End: 2022-09-13

## 2022-09-03 ASSESSMENT — ENCOUNTER SYMPTOMS
DIARRHEA: 1
EYE DISCHARGE: 0
SHORTNESS OF BREATH: 0
SINUS PRESSURE: 0
VOMITING: 0
ABDOMINAL PAIN: 1
BLOOD IN STOOL: 0
BACK PAIN: 0
WHEEZING: 0
EYE PAIN: 0
ABDOMINAL DISTENTION: 0
SORE THROAT: 0
NAUSEA: 0
COUGH: 0
EYE REDNESS: 0

## 2022-09-03 ASSESSMENT — PAIN DESCRIPTION - DESCRIPTORS: DESCRIPTORS: PRESSURE

## 2022-09-03 ASSESSMENT — PAIN DESCRIPTION - LOCATION: LOCATION: ABDOMEN;BACK

## 2022-09-03 ASSESSMENT — PAIN - FUNCTIONAL ASSESSMENT: PAIN_FUNCTIONAL_ASSESSMENT: 0-10

## 2022-09-03 ASSESSMENT — PAIN SCALES - GENERAL: PAINLEVEL_OUTOF10: 4

## 2022-09-03 ASSESSMENT — PAIN DESCRIPTION - PAIN TYPE: TYPE: ACUTE PAIN

## 2022-09-03 ASSESSMENT — PAIN DESCRIPTION - ORIENTATION: ORIENTATION: LOWER

## 2022-09-03 ASSESSMENT — PAIN DESCRIPTION - FREQUENCY: FREQUENCY: CONTINUOUS

## 2022-09-03 NOTE — ED PROVIDER NOTES
The history is provided by the patient. Dysuria   This is a new problem. The current episode started more than 2 days ago. The problem occurs every urination. The quality of the pain is described as burning. The pain is moderate. There has been no fever. Associated symptoms include frequency and urgency. Pertinent negatives include no chills, no nausea, no vomiting, no discharge, no hematuria, no hesitancy and no flank pain. Review of Systems   Constitutional:  Negative for chills and fever. HENT:  Negative for ear pain, sinus pressure and sore throat. Eyes:  Negative for pain, discharge and redness. Respiratory:  Negative for cough, shortness of breath and wheezing. Cardiovascular:  Negative for chest pain. Gastrointestinal:  Positive for abdominal pain and diarrhea. Negative for abdominal distention, blood in stool, nausea and vomiting. Genitourinary:  Positive for dysuria, frequency and urgency. Negative for flank pain, hematuria and hesitancy. Musculoskeletal:  Negative for arthralgias and back pain. Skin:  Negative for rash and wound. Neurological:  Negative for weakness and headaches. Hematological:  Negative for adenopathy. All other systems reviewed and are negative. Physical Exam  Vitals and nursing note reviewed. Constitutional:       Appearance: She is well-developed. HENT:      Head: Normocephalic and atraumatic. Right Ear: Hearing and external ear normal.      Left Ear: Hearing and external ear normal.      Nose: Nose normal.      Mouth/Throat:      Pharynx: Uvula midline. Eyes:      General: Lids are normal.      Conjunctiva/sclera: Conjunctivae normal.      Pupils: Pupils are equal, round, and reactive to light. Cardiovascular:      Rate and Rhythm: Normal rate and regular rhythm. Heart sounds: Normal heart sounds. No murmur heard. Pulmonary:      Effort: Pulmonary effort is normal. No respiratory distress.       Breath sounds: Normal breath Ref Range    Color, UA Straw Straw/Yellow    Clarity, UA Clear Clear    Glucose, Ur Negative Negative mg/dL    Bilirubin Urine Negative Negative    Ketones, Urine Negative Negative mg/dL    Specific Gravity, UA 1.015 1.005 - 1.030    Blood, Urine Negative Negative    pH, UA 5.5 5.0 - 9.0    Protein, UA Negative Negative mg/dL    Urobilinogen, Urine 0.2 <2.0 E.U./dL    Nitrite, Urine Negative Negative    Leukocyte Esterase, Urine TRACE (A) Negative   Pregnancy, urine   Result Value Ref Range    HCG(Urine) Pregnancy Test NEGATIVE NEGATIVE   Microscopic Urinalysis   Result Value Ref Range    WBC, UA 2-5 0 - 5 /HPF    RBC, UA 0-1 0 - 2 /HPF    Epithelial Cells, UA RARE /HPF    Renal Epithelial, UA RARE /HPF    Bacteria, UA RARE (A) None Seen /HPF       Radiology:  No orders to display       ------------------------- NURSING NOTES AND VITALS REVIEWED ---------------------------  Date / Time Roomed:  9/3/2022 10:32 AM  ED Bed Assignment:  01/01    The nursing notes within the ED encounter and vital signs as below have been reviewed. /80   Pulse 88   Temp 97.1 °F (36.2 °C) (Temporal)   Resp 16   Ht 5' 6\" (1.676 m)   Wt 225 lb (102.1 kg)   LMP 08/26/2022 (Approximate)   SpO2 97%   Breastfeeding No   BMI 36.32 kg/m²   Oxygen Saturation Interpretation: Normal      ------------------------------------------ PROGRESS NOTES ------------------------------------------  I have spoken with the patient and discussed todays results, in addition to providing specific details for the plan of care and counseling regarding the diagnosis and prognosis. Their questions are answered at this time and they are agreeable with the plan. I discussed at length with them reasons for immediate return here for re evaluation. They will followup with primary care by calling their office tomorrow.     Medications - No data to display      --------------------------------- ADDITIONAL PROVIDER NOTES ---------------------------------  At this time the patient is without objective evidence of an acute process requiring hospitalization or inpatient management. They have remained hemodynamically stable throughout their entire ED visit and are stable for discharge with outpatient follow-up. The plan has been discussed in detail and they are aware of the specific conditions for emergent return, as well as the importance of follow-up. New Prescriptions    DICYCLOMINE (BENTYL) 10 MG CAPSULE    Take 1 capsule by mouth 4 times daily (before meals and nightly)    LOPERAMIDE (RA ANTI-DIARRHEAL) 2 MG CAPSULE    Take 1 capsule by mouth 4 times daily as needed for Diarrhea       Diagnosis:  1. Viral gastroenteritis        Disposition:  Patient's disposition: Discharge to home  Patient's condition is stable.                     Gricel Cortés MD  09/03/22 8894

## 2022-09-06 ENCOUNTER — HOSPITAL ENCOUNTER (OUTPATIENT)
Dept: PSYCHIATRY | Age: 22
Setting detail: THERAPIES SERIES
Discharge: HOME | End: 2022-09-06
Payer: COMMERCIAL

## 2022-09-06 ENCOUNTER — APPOINTMENT (OUTPATIENT)
Dept: PSYCHIATRY | Age: 22
End: 2022-09-06
Payer: COMMERCIAL

## 2022-09-06 PROCEDURE — H0015 ALCOHOL AND/OR DRUG SERVICES: HCPCS
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
## 2022-09-07 ENCOUNTER — HOSPITAL ENCOUNTER (OUTPATIENT)
Dept: PSYCHIATRY | Age: 22
Setting detail: THERAPIES SERIES
Discharge: HOME | End: 2022-09-07
Payer: COMMERCIAL

## 2022-09-07 PROCEDURE — 80305 DRUG TEST PRSMV DIR OPT OBS: CPT

## 2022-09-07 PROCEDURE — 99214 OFFICE O/P EST MOD 30 MIN: CPT

## 2022-09-07 PROCEDURE — 90832 PSYTX W PT 30 MINUTES: CPT

## 2022-09-07 NOTE — PROGRESS NOTES
PSYCHIATRY ATTENDING NOTE:    CC:  \"Feeling much better. \"    S: Patient being seen at IOP in follow-up for polysubstance abuse, depression and anxiety. Met with patient to discuss progress with treatment. Patient reports that she is feeling much better. She states that her anxiety is much improved with the medication. She feels the Vistaril is very helpful. She states that she is sleeping much better. \"I have been sleeping all night and that never happens. \" She still is feeling depressed and unmotivated but is smiling and hopeful about the future. Patient states she was at court yesterday for family treatment plan. States she will be going every Friday to talk about her progress to try and get her children back. Patient feels she is making progress with her mental health and is feeling better overall. She states she is not having any cravings for cocaine and is doing well staying away from relatives and friends that are using. She is not suicidal, homicidal, manic or psychotic. MSE:  Marifer Castro female appears age. Pleasant, cooperative, forthcoming. Normal psychomotor activity, gait, strength, tone, eye contact. Mood depressed. Affect congruent. Speech clear. Thoughts organized. Content future-oriented. No suicidal or homicidal ideations. No paranoia, delusions or hallucinations. Orientation, concentration, recent and remote memory are grossly intact. Fund of knowledge fair. Language use fair. Insight and judgment fair. MEDICATIONS:   Lexapro 10 mg nightly (increasing)  Vistaril 25 mg bid prn (cont)     ASSESSMENT:    Major Depression Disorder, recurrent moderate                                      Borderline Personality Disorder                                      Polysubstance Abuse       PLAN: Continue with treatment in dual IOP. Support, reassurance and psycho-education provided along with nutrition education. Will increase Lexapro to 10 mg for depression and anxiety.  Continue with Vistaril for sleep and anxiety. Continue to monitor symptoms, side effects and response to medications. See back in two weeks.  Discuss with treatment team.                          Electronically signed by JAYCOB Nick CNP on 9/7/2022 at 9:18 AM
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
## 2022-09-07 NOTE — GROUP NOTE
Group Therapy Note    Date: 2022    Group Start Time:  9:00 AM  Group End Time: 10:15 AM  Group Topic: Psychotherapy    [address], MSW, LSW        Group Therapy Note    Attendees: 5       Patient's Goal:  To maintain sobriety    Notes:  Client had a productive group. She noted she began to use substances when her father  several years ago. For her death and loss are triggers for her to use. The group came together to help to support her in these trying times. They noted using is only a fast quick stop and it won't help, but it will make it more difficult for her to maintain sobriety because using will flow into habitual use. She was receptive to the group's intervention. She realizes she has to maintain her sobriety,even throughout difficult situations. She was active and had good input into the group. Status After Intervention:  Improved    Participation Level:  Active Listener and Interactive    Participation Quality: Appropriate, Attentive, Sharing, and Supportive      Speech:  normal      Thought Process/Content: Logical      Affective Functioning: Congruent      Mood: euthymic      Level of consciousness:  Alert, Oriented x4, and Attentive      Response to Learning: Able to verbalize current knowledge/experience, Able to verbalize/acknowledge new learning, Able to retain information, Capable of insight, Able to change behavior, and Progressing to goal      Endings: None Reported    Modes of Intervention: Education, Support, Socialization, Exploration, and Clarifying      Discipline Responsible: /Counselor      Signature:  ROBERT Georges, ANDREE

## 2022-09-08 ENCOUNTER — APPOINTMENT (OUTPATIENT)
Dept: PSYCHIATRY | Age: 22
End: 2022-09-08
Payer: COMMERCIAL

## 2022-09-08 ENCOUNTER — HOSPITAL ENCOUNTER (OUTPATIENT)
Dept: PSYCHIATRY | Age: 22
Setting detail: THERAPIES SERIES
Discharge: HOME | End: 2022-09-08
Payer: COMMERCIAL

## 2022-09-08 LAB
C. TRACHOMATIS DNA ,URINE: NEGATIVE
N. GONORRHOEAE DNA, URINE: NEGATIVE
SOURCE: NORMAL

## 2022-09-08 PROCEDURE — H0015 ALCOHOL AND/OR DRUG SERVICES: HCPCS

## 2022-09-12 ENCOUNTER — APPOINTMENT (OUTPATIENT)
Dept: PSYCHIATRY | Age: 22
End: 2022-09-12
Payer: COMMERCIAL

## 2022-09-12 ENCOUNTER — HOSPITAL ENCOUNTER (OUTPATIENT)
Dept: PSYCHIATRY | Age: 22
Setting detail: THERAPIES SERIES
Discharge: HOME | End: 2022-09-12
Payer: COMMERCIAL

## 2022-09-12 PROCEDURE — 80305 DRUG TEST PRSMV DIR OPT OBS: CPT

## 2022-09-12 PROCEDURE — H0015 ALCOHOL AND/OR DRUG SERVICES: HCPCS

## 2022-09-12 NOTE — CARE COORDINATION
PRS met with Kleber Fink briefly per peers request, peer was asking about job opportunities/professional development. PRS provided Chaparrita with [employer] at [facility] number, for further exploration of professional development needs. [address] is located in [place], and can assist with work development.   PRS encouraged Chaparrita to give them a call peer was receptive

## 2022-09-12 NOTE — PLAN OF CARE
Client discussed in treatment team today. Present:   Dr. Gerry Archer, Rolly Aragon and Joaquin Baez MSN  STIVEN    Current Status: Active in the Dual Program    Treatment goals:    [] Relapse prevention  [x] Increase sober support  [] Increase attendance in sober support groups  [x] [place] effectively with cravings and urges  [] Other:     Recommendation: Needs to attend sober meetings more often, she has a sponsor and has been invested in her treatment. She continues to be treated for trauma and substance abuse.     Electronically signed by ROBERT Francisco, ANDREE on 9/12/2022 at 1:05 PM
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
## 2022-09-12 NOTE — PLAN OF CARE
[facility]\""- Level of Care Placement      []Admissions  [x]Continued Stay []Discharge/Transfer / Complication in [address] ZEOQ:1/49/1068    Client Sticker      Level of Care Level 1      Outpatient Services Level 2.1   Intensive Outpatient Services(IOP) Level 2.5   Partial Hospitalization Services Level 3.1 CLINICALLY Managed Low-Intensity Residential Services Level 3.3  CLINICALLY Managed Population- Specific High- Intensity Residential Services Level 3.5  CLINICALLY Managed High Intensive Residential Services Level 3.7  MEDICALLY Monitored Intensive Inpatient Services Level 4  MEDICALLY Managed Intensive Inpatient Services   Dimension 1  Acute Intoxication and/or Withdrawal Potential [x] Not experiencing significant withdrawal    [] Minimal  risk of severe  withdrawal [] Minimal  risk of severe  withdrawal    [] Manageable  at Level 2-WM [] Moderate  risk of severe withdrawal    [] Manageable at Level 2-WM [] No withdrawal risk or minimal or stable withdrawal     [] Concurrently receiving Level -WM or Level 2-WM services [] Minimal risk of severe withdrawal    [] If withdrawal is present, manageable at Level 3. 2-WM  [] Minimal risk of severe withdrawal    [] If withdrawal is present manageable at Level 3. 2-WM [] High risk of withdrawal, but manageable at Level  3.7-WM and does not require  full resources of a licensed hospital [] At high risk of withdrawal and requires Level 4-WM and full resources of licensed hospital    COMMENTS:           Dimension 2   Biomedical Conditions and Complications  (BMC/C) [] None or very stable    [x] Receiving concurrent medical monitoring  [] None or not a distraction from treatment    [] Problems are manageable at Level 2.1 [] None or not sufficient to distract from treatment    [] Problems are manageable at  Level 2.5 [] None or stable    [] Receiving concurrent medical monitoring  [] None or stable    [] Receiving concurrent medical monitoring  [] None or stable    [] Receiving concurrent medical monitoring [] Requires 24-hour medical monitoring  but not intensive treatment [] Requires 24-hour medical & nursing care and the full  resources of a licensed hospital   COMMENTS:           Dimension 3  Emotional,  Behavioral,  or Cognitive Conditions and Complications   (EBC/C) [] None or very stable    [x] Receiving concurrent mental health monitoring [] Mild severity  with potential to distract from recovery; needs monitoring [] Mild to moderate severity, with potential to distract from recovery, needs stabilization [] None or minimal; not distracting to recovery    [] If  stable, a    co-occurring capable program is appropriate    [] If not stable, a co-occurring enhanced program is required [] Mild to moderate severity; needs structure to focus on recovery. Treatment should be designed to address significant cognitive deficits     [] If  stable, a  co-occurring capable program is appropriate    [] If not stable, a co-occurring enhanced program is required [] Demonstrates repeated inability to control impulses or unstable & dangerous signs/ symptoms require stabilization. Other functional deficits require stabilization and 24-hr.  setting to prepare for community integration  & continuing care    [] Co-occurring enhanced setting is required for those with severe & chronic mental illness [] Moderate severity;  needs 24-hour   structured setting    [] If client has co-occurring mental disorder, requires concurrent mental health services in a medically monitored setting  [] Severe and unstable problems;  requires 24-hour psychiatric care  with concomitant addiction treatment   COMMENTS:             Staff: Juan Alberto ALVARADO_  Date:9/12/2022____________                   [address] Placement      []Admissions  [x]Continued Stay []Discharge/Transfer / Complication in [place] Date:9/12/2022    Client Sticker      Level of Care Level 1  Outpatient   Services Level 2.1  Intensive  Outpatient  Services Level 2.5  Partial Hospitalization Services Level 3.1  CLINICALLY Managed Low-intensity Residential Services Level 3.3  CLINICALLY Managed Population- Specific High- Intensity Residential Services Level 3.5  CLINICALLY Managed High-Intensive Residential Services Level 3.7  MEDICALLY Monitored Intensive Inpatient Services Level 4  MEDICALLY Managed Intensive Inpatient Services   Dimension 4  Readiness  To  Change [] Ready for recovery but needs motivating and monitoring strategies to strengthen readiness    []Needs ongoing monitoring and disease management    [] High severity in this dimension but not in other dimensions. Needs Level 1 motivational enhancement strategies   [x] Has variable engagement in treatment, ambivalence, or lack of awareness of substance use or mental health problems and requires structured program several times/wk. to promote progress through stages of change [] Has poor engagement in treatment, significant ambivalence, or lack of awareness of substance use or mental health problems, requires near daily structured program or intensive engagement to promote progress through stages of change [] Open to recovery, but needs structured environment to maintain therapeutic gains [] Has little awareness & needs interventions at Level 3.3 to engage & stay in treatment.     [] If there is high severity in this dimension, but not in any other dimension, motivational enhancement strategies should be provided in Level 1 [] Has marked difficulty with, or opposition to treatment with dangerous consequences    [] If there is high severity in this dimension, but not in any other dimension, motivational enhancement strategies should be provided in Level 1 [] Low interest in treatment and impulse control is poor despite negative consequences;  needs motivating strategies only safely available in 24-hour structured setting    [] If there is high severity in this dimension, but not in any other dimension, motivational enhancement strategies should be provided in Level 1 [] Problems in this dimension do not qualify the client for Level 4 services    [] If the client's only severity is in Dimension 4,5, and/or 6 without high severity in Dimensions 1,2, and/or 3, then client is not qualified for Level 4   COMMENTS:           Dimension 5  Relapse, Continued Use or Continued Problem Potential  [] Able to maintain abstinence or control use and/or addictive behaviors  and pursue recovery or motivational goals with minimal support [x] Intensification of addiction or mental health symptoms indicate high likelihood of relapse risk or continued use or continued problems without  close monitoring and support several times/wk.  [] Intensification of addiction or mental health symptoms, despite active participation in a Level 1 or 2.1 program, indicates high likelihood of relapse or continued use or continued problems without near-daily monitoring [] Understands relapse but needs structure to maintain therapeutic gains  [] Has little awareness and needs interventions available only at Level 3.3 to prevent continued use, with imminent dangerous consequences, because of cognitive deficits or  comparable dysfunction  [] Has no recognition  of the skills needed to prevent continued use,  with imminently dangerous  consequences [] Unable to control use, with imminently dangerous consequences,  despite active participation at less intensive levels of care [] Problems in this dimension do not qualify the client for Level 4 services    [] If the client's only severity is in Dimension 4,5, and/or 6 without high severity in Dimensions 1,2, and/or 3, then client is not qualified for Level 4   COMMENTS:           Dimension 6  Recovery/Living Environment [x]  Recovery environment is supportive and/or the client has skills to cope [] Recovery environment is not supportive  but with structure and support, the client can cope [] Recovery environment is not supportive, but with structure and support and relief from the home environment, client can cope [] Environment    is dangerous, but recovery is achievable if Level 3.1 24-hour structure is available  [] Environment is dangerous and  client needs 24-hour structure to learn to cope [] Environment is dangerous, and the client lacks skills to cope outside of a  highly structured 24-hour setting   [] Environment is dangerous, and the client lacks skills to cope outside of a  highly structured 24-hour setting [] Problems in this dimension do not qualify the client for Level 4 services    [] If the client's only severity is in Dimension 4,5, and/or 6 without high severity in Dimensions 1,2, and/or 3, then client is not qualified for Level 4   COMMENTS:               Staff: Nayla ALVARADO__  Date:9/12/2022____________

## 2022-09-12 NOTE — PROGRESS NOTES
Group Note    Start Time: 7342 AM  End Time: 12:00 PM  Number of participants:6  Type of group: Relapse Prevention  Mode of intervention: Education, Support, Socialization, Exploration, Clarifying, and Problem-solving  Topic: Triggers  Objective:  Explore triggers related to substance use and identify and develop positive coping skills to avoid relapse. Note: Client actively participated in the groups session. She was asked to identify and explore triggers related to substance use. She identified feelings of stress, flicking lighters,  and being around people that use drugs as triggers. Client verbalized and processed 4-6 coping skills she is utlizing to set boundaries and verbalized awareness on how a return to substance use will impact her life. Client shared how seeing ceratin lighters makes her think of substances use and she shared with group that she called her sponsor and processed her thoughts related to substance use. Client was praised from peers and counselor for making a good choice to support her recovery process. Status after intervention:Improved  Participation level: Active Listener and Interactive  Participation Quality: Appropriate, Attentive, Sharing, and Supportive  Speech: normal  Thought Process/Content:Logical  Mood/Affect: brightens  Self report: None Reported  Response to learning: Able to verbalize current knowledge/experience, Able to verbalize/acknowledge new learning, Able to retain information, Capable of insight, Able to change behavior, and Progressing to goal  Discipline Responsible: /Counselor    [x] Check in completed and reviewed. Intervention required.  No  Electronically signed by Lilo Walsh on 3/68/6208 at 4:00 PM

## 2022-09-13 ENCOUNTER — APPOINTMENT (OUTPATIENT)
Dept: PSYCHIATRY | Age: 22
End: 2022-09-13
Payer: COMMERCIAL

## 2022-09-13 ENCOUNTER — HOSPITAL ENCOUNTER (OUTPATIENT)
Dept: PSYCHIATRY | Age: 22
Setting detail: THERAPIES SERIES
Discharge: HOME | End: 2022-09-13
Payer: COMMERCIAL

## 2022-09-13 PROCEDURE — H0015 ALCOHOL AND/OR DRUG SERVICES: HCPCS

## 2022-09-13 NOTE — GROUP NOTE
Group Therapy Note    Date: 9/13/2022    Group Start Time: 10:30 AM  Group End Time: 12:00 PM  Group Topic: Relapse Prevention    [facility]    ROBERT Chaney LSW        Group Therapy Note    Attendees: 5       Patient's Goal:  To maintain sobriety    Notes: Date: 9-13-22  Start Time: 10:30  End Time: 12:00  Type of group: Relapse prevention/ co facilitated with recovered volunteer  Number of participants: 5  Mode of intervention: existential factors, altruism, imparting of information, instillation of hope, correction of the primary family, and interpersonal learning. Topic: Looking back on using  Objective: Comparing living in [facility], [email or web address] noted she used because of past trauma and poor relationships she had in the past with her family members and peers. She did not realize she had power to control her use and the self esteem to rise above her circumstances to where she is now sober. She noted peers who use to use no longer associate with er since she has been moving forward in her recovery. Status After Intervention:  Improved    Participation Level:  Active Listener and Interactive    Participation Quality: Appropriate, Attentive, Sharing, and Supportive      Speech:  normal      Thought Process/Content: Logical      Affective Functioning: Congruent      Mood: euthymic      Level of consciousness:  Alert, Oriented x4, and Attentive      Response to Learning: Able to verbalize current knowledge/experience, Able to verbalize/acknowledge new learning, Able to retain information, Capable of insight, Able to change behavior, and Progressing to goal      Endings: None Reported    Modes of Intervention: Education, Support, Socialization, Exploration, and Clarifying      Discipline Responsible: /Counselor      Signature:  ROBERT Chaney LSW

## 2022-09-15 ENCOUNTER — HOSPITAL ENCOUNTER (OUTPATIENT)
Dept: PSYCHIATRY | Age: 22
Setting detail: THERAPIES SERIES
Discharge: HOME | End: 2022-09-15
Payer: COMMERCIAL

## 2022-09-15 ENCOUNTER — APPOINTMENT (OUTPATIENT)
Dept: PSYCHIATRY | Age: 22
End: 2022-09-15
Payer: COMMERCIAL

## 2022-09-15 PROCEDURE — H0015 ALCOHOL AND/OR DRUG SERVICES: HCPCS

## 2022-09-15 PROCEDURE — 80305 DRUG TEST PRSMV DIR OPT OBS: CPT
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
## 2022-09-19 ENCOUNTER — APPOINTMENT (OUTPATIENT)
Dept: PSYCHIATRY | Age: 22
End: 2022-09-19
Payer: COMMERCIAL

## 2022-09-19 ENCOUNTER — HOSPITAL ENCOUNTER (OUTPATIENT)
Dept: PSYCHIATRY | Age: 22
Setting detail: THERAPIES SERIES
Discharge: HOME | End: 2022-09-19
Payer: COMMERCIAL

## 2022-09-19 PROCEDURE — 80305 DRUG TEST PRSMV DIR OPT OBS: CPT

## 2022-09-19 PROCEDURE — H0015 ALCOHOL AND/OR DRUG SERVICES: HCPCS

## 2022-09-19 NOTE — GROUP NOTE
Date: 9-19-22  Start Time: 9:00  End Time: 10:15  Number of Participants: 6  Type of Group: Psychotherapy  Mode of intervention: existential factors, universality, instillation of hope, group cohesiveness, interpersonal learning, and catharsis. Topic: working out sobriety  Objective for each member to support the other. Note: RADHA had an excellent group as she had self disclosure. She noted she has grown since she has attended [facility]. She has learned skills to cope and to resist people and places where substances are commonly used. She noted her cousin stayed with her last night. RADHA set boundaries with her that her home is a drug free zone and her cousin respected her wises. She is waiting to get into [place] [id number] counseling since she has dropped out of the Dual program. She has court on the 28 th to review her case and to be scheduled for MH counseling. She has been active in securing a sponsor and attends sober meetings frequently. Status after intervention:Improved  Participation level: Active Listener and Interactive  Participation Quality: Appropriate, Attentive, Sharing, and Supportive  Speech: normal  Thought Process/Content:Logical  Mood/Affect: calm and congruent  Self report: None Reported  Response to learning: Able to verbalize current knowledge/experience, Able to verbalize/acknowledge new learning, Able to retain information, Capable of insight, Able to change behavior, and Progressing to goal  Discipline Responsible: /Counselor    [x] Check in completed and reviewed. Intervention required.  No    Electronically signed by ROBERT Lerner, ANDREE on 9/19/2022 at 2:27 PM

## 2022-09-19 NOTE — GROUP NOTE
Start Time: 1045 AM  End Time: 1200  PM  Number of participants:6  Type of group: Recovery  Mode of intervention: Education, Support, Socialization, Exploration, Clarifying, Problem-solving, Confrontation, Limit-setting, and Reality-testing  Topic: Attitudes / Core Beliefs  Objective:  Explore how attitudes and core beliefs impact our decision making related to substance use / and abuse. Note: Client participated in the group session. He was assigned to discuss how attitudes / Core beliefs from \" Living in  Balance\"impact recovery. Client was able to verbalize some insight from the lesson and she identified how denial of her substance use disorder and being with using peers hindered her attitude towards recovery that led to addiction. Client shared 2-3 positive aspects of her sobriety since changing her attitude and beliefs about sobriety. Status after intervention:Improved  Participation level: Active Listener and Interactive  Participation Quality: Appropriate, Attentive, Sharing, and Supportive  Speech: normal  Thought Process/Content:Logical  Mood/Affect: brightens  Self report: None Reported  Response to learning: Able to verbalize current knowledge/experience, Able to verbalize/acknowledge new learning, Able to retain information, Capable of insight, Able to change behavior, and Progressing to goal  Discipline Responsible: /Counselor    [x] Check in completed and reviewed. Intervention required.  No    Electronically signed by Kristyn Santos 320 on 4/69/4618 at 3:36 PM

## 2022-09-20 ENCOUNTER — HOSPITAL ENCOUNTER (OUTPATIENT)
Dept: PSYCHIATRY | Age: 22
Setting detail: THERAPIES SERIES
Discharge: HOME | End: 2022-09-20
Payer: COMMERCIAL

## 2022-09-20 ENCOUNTER — APPOINTMENT (OUTPATIENT)
Dept: PSYCHIATRY | Age: 22
End: 2022-09-20
Payer: COMMERCIAL

## 2022-09-20 PROBLEM — F33.1 MAJOR DEPRESSIVE DISORDER, RECURRENT, MODERATE (HCC): Status: ACTIVE | Noted: 2022-09-20

## 2022-09-20 PROCEDURE — 99214 OFFICE O/P EST MOD 30 MIN: CPT

## 2022-09-20 PROCEDURE — H0015 ALCOHOL AND/OR DRUG SERVICES: HCPCS

## 2022-09-20 RX ORDER — ESCITALOPRAM OXALATE 10 MG/1
10 TABLET, FILM COATED ORAL DAILY
Qty: 30 TABLET | Refills: 0 | Status: SHIPPED | OUTPATIENT
Start: 2022-09-20 | End: 2022-09-22 | Stop reason: SDUPTHER

## 2022-09-20 NOTE — GROUP NOTE
Start Time: 56   End Time:12:00 PM  Number of participants:5  Type of group: Recovery  Mode of intervention: Education, Support, Socialization, Exploration, Clarifying, Problem-solving, Activity, Confrontation, Limit-setting, and Reality-testing  Topic: Gratitude   Objective: Explore how gratitude can aid eliminating negative thoughts and increasing a positive mindset to support recovery process. Note: Client actively participated in the group session. Client was assigned to write a thank you letter to someone supportive of her recovery process. Client wrote a letter to herself and she tearfully shared with group 10-15 reasons why she is grateful for her sober self. Client shared how she has learned so much in group and how grateful that she is that she is getting the help & emotional support she needs to be a better mother to her children. Client stated \" I'm a better person for myself today\". Status after intervention:Improved  Participation level: Active Listener and Interactive  Participation Quality: Appropriate, Attentive, Sharing, and Supportive  Speech: normal  Thought Process/Content:Logical  Mood/Affect: brightens  Self report: None Reported  Response to learning: Able to verbalize current knowledge/experience, Able to verbalize/acknowledge new learning, Able to retain information, Capable of insight, Able to change behavior, and Progressing to goal  Discipline Responsible: /Counselor    [x] Check in completed and reviewed. Intervention required.  No    Electronically signed by Sonny Montes on 8/63/7364 at 2:09 PM

## 2022-09-20 NOTE — GROUP NOTE
Date: 9-20-22  Start Time: 9:00  End Time: 10:15  Type of group: Psychotherapy  Number of participants: 5  Mode of intervention: existential factors, catharsis, instillation of hope, universality, imparting of information, and correction of the primary family. Topic: forgiveness in recovery  Objective: getting past hurts that keep us from moving on    Note: Chris noted the hurt she had because her mother was not involved in her life when she was growing up. Now her mother lives with a boyfriend and and his 3 daughters. She gives the attention to those girls whereas she did not give to her when she was growing up. She noted this is very painful to her and troubles her. Another group member noted she had been the same issue and she forgave her mother. Now her and her mother are best friends. This inspired Chaparrita to look at the situation differently and to free herself from past hurtful events. Status after intervention:Improved  Participation level: Active Listener and Interactive  Participation Quality: Appropriate, Attentive, Sharing, and Supportive  Speech: normal  Thought Process/Content:Logical  Mood/Affect: sad , brightens, calm, and congruent  Self report: None Reported  Response to learning: Able to verbalize current knowledge/experience, Able to verbalize/acknowledge new learning, Able to retain information, Capable of insight, Able to change behavior, and Progressing to goal  Discipline Responsible: /Counselor    [x] Check in completed and reviewed. Intervention required.  No    Electronically signed by ROBERT Roman LSW on 9/20/2022 at 12:01 PM

## 2022-09-20 NOTE — PROGRESS NOTES
PSYCHIATRY ATTENDING NOTE:    CC: \"I'm feeling good. \"    S: Patient being seen at IOP in follow-up for polysubstance abuse, depression and anxiety. Met with patient to discuss progress with treatment. Also discussed patient with treatment team. Patient reports that she is feeling \"very good, much better. \" She rates her depression #5 out of #10 and anxiety #4 out of #10. She states that she is getting out more and going places and not laying around on her couch watching TV. She states that she is exercising and eating more healthy which she states is helping her mental health. She reports that the groups have been very helpful as well. She is not having any cravings to use cocaine or alcohol. She reports that she is sleeping well with the medications. She is not suicidal, homicidal, manic or psychotic. No acute issues or concerns. MSE:  Bubba Harris female appears age. Pleasant, cooperative, forthcoming. Normal psychomotor activity, gait, strength, tone, eye contact. Mood depressed. Affect congruent. Speech clear. Thoughts organized. Content future-oriented. No suicidal or homicidal ideations. No paranoia, delusions or hallucinations. Orientation, concentration, recent and remote memory are grossly intact. Fund of knowledge fair. Language use fair. Insight and judgment fair. MEDICATIONS:  Lexapro 10 mg nightly (cont)  Vistaril 25 mg bid prn (cont)        ASSESSMENT:   Major Depression Disorder, recurrent moderate                                      Borderline Personality Disorder                                      Polysubstance Abuse    PLAN: Continue with treatment in dual IOP. Support, reassurance and psycho-education provided along with nutrition education. Continue with Lexapro and Vistaril for sleep and anxiety. Continue to monitor symptoms, side effects and response to medications. See back in two weeks.  Discuss with treatment team.                          Electronically signed by Delonte Aldridge Shey Tinoco - CNP on 9/20/2022 at 9:10 AM

## 2022-09-21 NOTE — CARE COORDINATION
DIAGNOSTIC IMPRESSIONS: Substance-Use Disorder (SUB)    Scale: DSM-V Diagnosis Code   No diagnosis                    0-1    Mild TIMBO                          2-3    Moderate TIMBO                 4-5    Severe TIMBO                      6+ F17.200-Tobacco   Other factors: Client referred by [employer] related to theft charge. Client reports no current substance abuse / dependence issues. Client has some unmanaged mental health and has appointment at [facility] today. Collateral  information will be collected as long as no substance use issues are reported there will be no Tx. recommended. Electronically signed by Sonny Valle on 3/11/9380 at 11:28 AM           Criteria symptoms   A problematic pattern of substance use leading to clinically significant impairment or distress, as manifested by at least two of the following, occurring within a 12-month period. [x] Taken in larger amounts or over longer time than intended. [x] Persistent desire or unsuccessful efforts to cut down/control use. [] Great deal of time spent obtaining , using, and recovering from effects. [x] Craving or strong desire to use. [] Recurrent use resulting in failure to fulfill major role obligations at work; school, or home.    [] Continued use despite persistent or recurrent social/interpersonal problems caused or exacerbated by effects. [] Giving up important social, occupational or recreational activities because of use. [x] Recurrent use in situations in which it is physically hazardous. [] Continued use despite knowledge of persistent or recurrent physical or psychological problem likely caused/exacerbated by use. [x] Tolerance as defined by either:  Need for increased amounts to achieve intoxication or desired effects. Diminished effect with continued use of the same amount. [x] Withdrawal as manifested by either:   The characteristic withdrawl symptoms  Using to relieve or avoid withdrawal symptoms

## 2022-09-22 ENCOUNTER — APPOINTMENT (OUTPATIENT)
Dept: PSYCHIATRY | Age: 22
End: 2022-09-22
Payer: COMMERCIAL

## 2022-09-22 ENCOUNTER — HOSPITAL ENCOUNTER (OUTPATIENT)
Dept: PSYCHIATRY | Age: 22
Setting detail: THERAPIES SERIES
Discharge: HOME | End: 2022-09-22
Payer: COMMERCIAL

## 2022-09-22 PROCEDURE — H0015 ALCOHOL AND/OR DRUG SERVICES: HCPCS

## 2022-09-22 RX ORDER — ESCITALOPRAM OXALATE 10 MG/1
10 TABLET ORAL DAILY
Qty: 30 TABLET | Refills: 0 | Status: SHIPPED | OUTPATIENT
Start: 2022-09-22 | End: 2022-10-13

## 2022-09-22 NOTE — GROUP NOTE
Date: 9-22-22  Start Time: 9:00  End Time: 10:30  Type of group: psychotherapy  Number of participants: 4  Co-facilitory: Nellie Jha  Mode of intervention: existential factors, universally, altruism, imparting of information, socialization, and interpersonal learning. Topic: Assessing our current state of sobriety  Objective: Noting our progress in treatment    Note: RADHA noted she has been placing restrictions on others close to her to avoid relapsing. She had her cousin stay at her home recently, the cousin did not use while at her house. She has upcoming court to review her progress which is a stress for her. She has been making sober meetings and she has a sponsor. She is intent on not using again. She further noted being upset because her mother seeks her out when issues flare up between mother and mother's paramour. Her mother will place RADHA in the middle of her and paramours conflict. This seems to be a recurring theme and RADHA is trying to avoid getting caught up in the middle. The group supported her having a neutral outlook regarding her mother and her mother's paramour. Status after intervention:Improved  Participation level: Active Listener and Interactive  Participation Quality: Appropriate  Speech: normal  Thought Process/Content:Logical  Mood/Affect: anxious  Self report: None Reported  Response to learning: Able to verbalize current knowledge/experience, Able to verbalize/acknowledge new learning, Able to retain information, Capable of insight, Able to change behavior, and Progressing to goal  Discipline Responsible: /Counselor    [x] Check in completed and reviewed. Intervention required.  No    Electronically signed by ROBERT Kapadia, ANDREE on 9/22/2022 at 3:17 PM

## 2022-09-22 NOTE — GROUP NOTE
Start Time: 65 AM   End Time 12:00 PM  Number of participants:3  Type of group: Recovery  Mode of intervention: Education, Support, Socialization, Exploration, Clarifying, Problem-solving, Confrontation, Limit-setting, and Reality-testing  Topic: Spirituality   Objective:  Explore spiritual attitude and recovery connection to aid recovery process. Note: Client actively participated in the group session. Chris was taught about how attitudes about spiritual matters can impact his recovery. Client verbalized understanding and shared how being sober has increased a positive outlook towards spirituality. Status after intervention:Improved  Participation level: Active Listener and Interactive  Participation Quality: Appropriate, Attentive, Sharing, and Supportive  Speech: normal  Thought Process/Content:Logical  Mood/Affect: brightens  Self report: None Reported  Response to learning: Able to verbalize current knowledge/experience, Able to verbalize/acknowledge new learning, Able to retain information, Capable of insight, Able to change behavior, and Progressing to goal  Discipline Responsible: /Counselor    [x] Check in completed and reviewed. Intervention required.  No    Electronically signed by Sonny Montes on 3/35/1818 at 3:29 PM

## 2022-09-26 ENCOUNTER — APPOINTMENT (OUTPATIENT)
Dept: PSYCHIATRY | Age: 22
End: 2022-09-26
Payer: COMMERCIAL

## 2022-09-26 ENCOUNTER — HOSPITAL ENCOUNTER (OUTPATIENT)
Dept: PSYCHIATRY | Age: 22
Setting detail: THERAPIES SERIES
Discharge: HOME | End: 2022-09-26
Payer: COMMERCIAL

## 2022-09-26 PROCEDURE — H0015 ALCOHOL AND/OR DRUG SERVICES: HCPCS

## 2022-09-26 RX ORDER — HYDROXYZINE PAMOATE 25 MG/1
25 CAPSULE ORAL 3 TIMES DAILY PRN
Qty: 28 CAPSULE | Refills: 0 | Status: SHIPPED | OUTPATIENT
Start: 2022-09-26 | End: 2022-10-10

## 2022-09-26 NOTE — PLAN OF CARE
[facility]- Level of Care Placement      []Admissions  [x]Continued Stay []Discharge/Transfer / Complication in [address] [place] [address] [facility]:0/58/0314    Client Sticker      Level of Care Level 1      Outpatient Services Level 2.1   Intensive Outpatient Services(IOP) Level 2.5   Partial Hospitalization Services Level 3.1 CLINICALLY Managed Low-Intensity Residential Services Level 3.3  CLINICALLY Managed Population- Specific High- Intensity Residential Services Level 3.5  CLINICALLY Managed High Intensive Residential Services Level 3.7  MEDICALLY Monitored Intensive Inpatient Services Level 4  MEDICALLY Managed Intensive Inpatient Services   Dimension 1  Acute Intoxication and/or Withdrawal Potential [x] Not experiencing significant withdrawal    [] Minimal  risk of severe  withdrawal [] Minimal  risk of severe  withdrawal    [] Manageable  at Level 2-WM [] Moderate  risk of severe withdrawal    [] Manageable at Level 2-WM [] No withdrawal risk or minimal or stable withdrawal     [] Concurrently receiving Level -WM or Level 2-WM services [] Minimal risk of severe withdrawal    [] If withdrawal is present, manageable at Level 3. 2-WM  [] Minimal risk of severe withdrawal    [] If withdrawal is present manageable at Level 3. 2-WM [] High risk of withdrawal, but manageable at Level  3.7-WM and does not require  full resources of a licensed hospital [] At high risk of withdrawal and requires Level 4-WM and full resources of licensed hospital    COMMENTS:           Dimension 2   Biomedical Conditions and Complications  (BMC/C) [x] None or very stable    [] Receiving concurrent medical monitoring  [] None or not a distraction from treatment    [] Problems are manageable at Level 2.1 [] None or not sufficient to distract from treatment    [] Problems are manageable at  Level 2.5 [] None or stable    [] Receiving concurrent medical monitoring  [] None or stable    [] Receiving concurrent medical monitoring  [] None or stable    [] Receiving concurrent medical monitoring [] Requires 24-hour medical monitoring  but not intensive treatment [] Requires 24-hour medical & nursing care and the full  resources of a licensed hospital   COMMENTS:           Dimension 3  Emotional,  Behavioral,  or Cognitive Conditions and Complications   (EBC/C) [] None or very stable    [x] Receiving concurrent mental health monitoring [] Mild severity  with potential to distract from recovery; needs monitoring [] Mild to moderate severity, with potential to distract from recovery, needs stabilization [] None or minimal; not distracting to recovery    [] If  stable, a    co-occurring capable program is appropriate    [] If not stable, a co-occurring enhanced program is required [] Mild to moderate severity; needs structure to focus on recovery. Treatment should be designed to address significant cognitive deficits     [] If  stable, a  co-occurring capable program is appropriate    [] If not stable, a co-occurring enhanced program is required [] Demonstrates repeated inability to control impulses or unstable & dangerous signs/ symptoms require stabilization. Other functional deficits require stabilization and 24-hr.  setting to prepare for community integration  & continuing care    [] Co-occurring enhanced setting is required for those with severe & chronic mental illness [] Moderate severity;  needs 24-hour   structured setting    [] If client has co-occurring mental disorder, requires concurrent mental health services in a medically monitored setting  [] Severe and unstable problems;  requires 24-hour psychiatric care  with concomitant addiction treatment   COMMENTS:             Staff: Sameera Urban Date:9/26/2022_                   [address] Placement      []Admissions  [x]Continued Stay []Discharge/Transfer / Complication in [facility]:1/03/9069    Client Sticker      Level of Care Level 1  Outpatient   Services Level 2.1  Intensive  Outpatient  Services Level 2.5  Partial Hospitalization Services Level 3.1  CLINICALLY Managed Low-intensity Residential Services Level 3.3  CLINICALLY Managed Population- Specific High- Intensity Residential Services Level 3.5  CLINICALLY Managed High-Intensive Residential Services Level 3.7  MEDICALLY Monitored Intensive Inpatient Services Level 4  MEDICALLY Managed Intensive Inpatient Services   Dimension 4  Readiness  To  Change [] Ready for recovery but needs motivating and monitoring strategies to strengthen readiness    []Needs ongoing monitoring and disease management    [] High severity in this dimension but not in other dimensions. Needs Level 1 motivational enhancement strategies   [x] Has variable engagement in treatment, ambivalence, or lack of awareness of substance use or mental health problems and requires structured program several times/wk. to promote progress through stages of change [] Has poor engagement in treatment, significant ambivalence, or lack of awareness of substance use or mental health problems, requires near daily structured program or intensive engagement to promote progress through stages of change [] Open to recovery, but needs structured environment to maintain therapeutic gains [] Has little awareness & needs interventions at Level 3.3 to engage & stay in treatment.     [] If there is high severity in this dimension, but not in any other dimension, motivational enhancement strategies should be provided in Level 1 [] Has marked difficulty with, or opposition to treatment with dangerous consequences    [] If there is high severity in this dimension, but not in any other dimension, motivational enhancement strategies should be provided in Level 1 [] Low interest in treatment and impulse control is poor despite negative consequences;  needs motivating strategies only safely available in 24-hour structured setting    [] If there is high severity in this dimension, but not in any other dimension, motivational enhancement strategies should be provided in Level 1 [] Problems in this dimension do not qualify the client for Level 4 services    [] If the client's only severity is in Dimension 4,5, and/or 6 without high severity in Dimensions 1,2, and/or 3, then client is not qualified for Level 4   COMMENTS:           Dimension 5  Relapse, Continued Use or Continued Problem Potential  [] Able to maintain abstinence or control use and/or addictive behaviors  and pursue recovery or motivational goals with minimal support [x] Intensification of addiction or mental health symptoms indicate high likelihood of relapse risk or continued use or continued problems without  close monitoring and support several times/wk.  [] Intensification of addiction or mental health symptoms, despite active participation in a Level 1 or 2.1 program, indicates high likelihood of relapse or continued use or continued problems without near-daily monitoring [] Understands relapse but needs structure to maintain therapeutic gains  [] Has little awareness and needs interventions available only at Level 3.3 to prevent continued use, with imminent dangerous consequences, because of cognitive deficits or  comparable dysfunction  [] Has no recognition  of the skills needed to prevent continued use,  with imminently dangerous  consequences [] Unable to control use, with imminently dangerous consequences,  despite active participation at less intensive levels of care [] Problems in this dimension do not qualify the client for Level 4 services    [] If the client's only severity is in Dimension 4,5, and/or 6 without high severity in Dimensions 1,2, and/or 3, then client is not qualified for Level 4   COMMENTS:           Dimension 6  Recovery/Living Environment []  Recovery environment is supportive and/or the client has skills to cope [x] Recovery environment

## 2022-09-26 NOTE — PLAN OF CARE
Client discussed in treatment team today. Present:   Dr. Anthony Mosley, Alice Sanchez and Aditya Frey MSN  STIVEN, SANTA Hoover, Blair Parker, [facility]     Current Status: Currently in the TIMBO IOP program     Treatment goals:    [x] Relapse prevention  [x] Increase sober support  [x] Increase attendance in sober support groups  [x] Tyrone effectively with cravings and urges  [] Other:      Recommendation: Client is transferred to TIMBO program and is being referred out to female mental health trauma specialist due to client verbalizing not wanting to be individual mental health counseling with a male counselor as it makes it uncomfortable her to express herself. Client is making excellent progress with demonstrating insight related to her substance use disorder , attending meetings, and demonstrating using her tools and she is ready to graduate.     Electronically signed by Sonny Moura on 5/60/4293 at 10:08 AM

## 2022-09-26 NOTE — GROUP NOTE
Start Time: 65 AM   End Time :  12:00 PM  Number of participants:8  Type of group: Psychoeducation  Mode of intervention: Education, Support, Socialization, Exploration, Clarifying, Problem-solving, Confrontation, Limit-setting, and Reality-testing  Topic: Disease concept of Addiction   Objective: To provide objective data about the impact of alcohol and other drug use , or dependence related to Living in Balance Curriculum. Note: Client participated in the group session. Client presented irritable and somewhat upset about having to come to group, and complained about her time being takes away. Client was asked to participate with handout on disease concept of addiction from [place]. Counselor presented information related to loss of control, chronic, progression , and disease being fatal. Matt Fearing related to the educations and  she verbalized and acknowledged that her substance made her lose control an described how her drug use led to loss of custody of her children. Status after intervention:Improved  Participation level: Active Listener and Interactive  Participation Quality: Appropriate, Attentive, Sharing, and Supportive  Speech: normal  Thought Process/Content:Logical  Mood/Affect: brightens and congruent  Self report: None Reported  Response to learning: Able to verbalize current knowledge/experience, Able to verbalize/acknowledge new learning, Able to retain information, Capable of insight, Able to change behavior, and Progressing to goal  Discipline Responsible: /Counselor    [x] Check in completed and reviewed. Intervention required.  No    Electronically signed by Marycruz Gray on 8/07/3739 at 1:52 PM

## 2022-09-27 ENCOUNTER — APPOINTMENT (OUTPATIENT)
Dept: PSYCHIATRY | Age: 22
End: 2022-09-27
Payer: COMMERCIAL

## 2022-09-27 ENCOUNTER — HOSPITAL ENCOUNTER (OUTPATIENT)
Dept: PSYCHIATRY | Age: 22
Setting detail: THERAPIES SERIES
Discharge: HOME | End: 2022-09-27
Payer: COMMERCIAL

## 2022-09-27 PROCEDURE — H0015 ALCOHOL AND/OR DRUG SERVICES: HCPCS

## 2022-09-27 ASSESSMENT — PATIENT HEALTH QUESTIONNAIRE - PHQ9: SUM OF ALL RESPONSES TO PHQ QUESTIONS 1-9: 6

## 2022-09-27 NOTE — CARE COORDINATION
Discharge PHQ-9 - Client scored a (6) and she is being referred to [facility] for trauma therapy.    Electronically signed by Kristyn Elder [place] [address] on 1/20/5578 at 9:44 AM

## 2022-09-27 NOTE — GROUP NOTE
Start Time: [address] AM  End Time: 12:00 PM  Number of participants:7  Type of group: Psychoeducation  Mode of intervention: Education, Support, Socialization, Exploration, Clarifying, Problem-solving, Media, Confrontation, Limit-setting, and Reality-testing  Topic: Medical Aspects of Chemical Dependency   Objective: Increase client's awareness of the harmful effects substance use has on mental and physical health. Note: Client actively participated in the group session. Client was assigned to view education \" Medical Aspects of Chemical Dependency\" By [employer]. Client actively listened and was able to verbalize 2-3 new insights learned from the education to aid maintaining sobriety. Chantal Leonard completed the IOP program and she signed discharge paperwork for aftercare. Status after intervention:Improved  Participation level: Active Listener and Interactive  Participation Quality: Appropriate, Attentive, Sharing, and Supportive  Speech: normal  Thought Process/Content:Logical  Mood/Affect: brightens  Self report: None Reported  Response to learning: Able to verbalize current knowledge/experience, Able to verbalize/acknowledge new learning, Able to retain information, Capable of insight, Able to change behavior, and Progressing to goal  Discipline Responsible: /Counselor    [x] Check in completed and reviewed. Intervention required.  No  Electronically signed by Sonny Montes on 3/25/5604 at 2:50 PM

## 2022-09-27 NOTE — GROUP NOTE
Date: 9-27-22  Start Time: 9:00  End Time: 10:15  Number of participants 8  Type of group: Psychotherapy  Mode of intervention: existential factors, installation of hope, universality, interpersonal learning, altruism, socialization and imparting of information. Topic: personal testimony  Objective: supporting each other through sobriety. Note: Bubba Young gave her testimony of her past use and her lifestyle. She noted she has gained so much through her treatment, not only through sobriety, but also making positive lifestyle changes and improving her self esteem. She graduated today and she read a letter she wrote to her self comparing and contrasting herself before and after treatment. The group strongly supported her because she graduated today. As always, she was active in group. Status after intervention:Improved  Participation level: Active Listener and Interactive  Participation Quality: Appropriate, Attentive, Sharing, and Supportive  Speech: normal  Thought Process/Content:Logical  Mood/Affect: calm and congruent  Self report: None Reported  Response to learning: Able to verbalize current knowledge/experience, Able to verbalize/acknowledge new learning, Able to retain information, Capable of insight, Able to change behavior, and Progressing to goal  Discipline Responsible: /Counselor    [x] Check in completed and reviewed. Intervention required.  No    Electronically signed by ROBERT Velázquez, ANDREE on 9/27/2022 at 2:26 PM
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
## 2022-09-29 ENCOUNTER — APPOINTMENT (OUTPATIENT)
Dept: PSYCHIATRY | Age: 22
End: 2022-09-29
Payer: COMMERCIAL

## 2022-09-29 ENCOUNTER — HOSPITAL ENCOUNTER (OUTPATIENT)
Dept: PSYCHIATRY | Age: 22
Setting detail: THERAPIES SERIES
End: 2022-09-29
Payer: COMMERCIAL

## 2022-10-03 ENCOUNTER — APPOINTMENT (OUTPATIENT)
Dept: PSYCHIATRY | Age: 22
End: 2022-10-03
Payer: COMMERCIAL

## 2022-10-04 ENCOUNTER — APPOINTMENT (OUTPATIENT)
Dept: PSYCHIATRY | Age: 22
End: 2022-10-04
Payer: COMMERCIAL

## 2022-10-05 ENCOUNTER — HOSPITAL ENCOUNTER (OUTPATIENT)
Dept: PSYCHIATRY | Age: 22
Setting detail: THERAPIES SERIES
Discharge: HOME | End: 2022-10-05
Payer: COMMERCIAL

## 2022-10-06 ENCOUNTER — APPOINTMENT (OUTPATIENT)
Dept: PSYCHIATRY | Age: 22
End: 2022-10-06
Payer: COMMERCIAL

## 2022-10-06 ENCOUNTER — HOSPITAL ENCOUNTER (OUTPATIENT)
Dept: PSYCHIATRY | Age: 22
Setting detail: THERAPIES SERIES
Discharge: HOME | End: 2022-10-06
Payer: COMMERCIAL

## 2022-10-06 PROCEDURE — 80305 DRUG TEST PRSMV DIR OPT OBS: CPT

## 2022-10-06 PROCEDURE — 99214 OFFICE O/P EST MOD 30 MIN: CPT

## 2022-10-06 RX ORDER — PRAZOSIN HYDROCHLORIDE 1 MG/1
1 CAPSULE ORAL NIGHTLY
Qty: 30 CAPSULE | Refills: 0 | Status: SHIPPED | OUTPATIENT
Start: 2022-10-06 | End: 2022-11-05

## 2022-10-06 NOTE — PROGRESS NOTES
PSYCHIATRY ATTENDING NOTE:    CC: \"Starting to have really bad nightmares. \"    S: Patient being seen at IOP in follow-up for polysubstance abuse,depression and anxiety. Met with patient to discuss progress with treatment. Also discussed patient with treatment team. Patient reports that she is struggling with nightmares almost every night. She states they have been getting worse the last two weeks. She rates her depression #8 out of #10 and tends to isolate herself with decreased appetite. Reports that her anxiety has been much better. She states she has been exercising \"and doing what needs to be done with the program.\" She reports that the groups have been helpful and is now going into after care. She reports that she has an appointment with Giorgio avitia this mouth for her follow-up care. She is not suicidal, homicidal, manic or psychotic. No acute issues or concerns. MSE: Elpidio Murders female appears age. Pleasant, cooperative, forthcoming. Normal psychomotor activity, gait, strength, tone, eye contact. Mood depressed. Affect congruent. Speech clear. Thoughts organized. Content future-oriented. No suicidal or homicidal ideations. No paranoia, delusions or hallucinations. Orientation, concentration, recent and remote memory are grossly intact. Fund of knowledge fair. Language use fair. Insight and judgment fair. MEDICATIONS:  Lexapro 20 mg nightly (Increasing)     Minipress 1 mg nightly (New)     Vistaril 25 mg bid prn (cont)    ASSESSMENT:   Major Depression Disorder, recurrent moderate                                      Borderline Personality Disorder                                      Polysubstance Abuse    PLAN: Patient will transfer to aftercare program. Support, reassurance and psycho-education provided. Will increase Lexapro to 20 mg and will add minipress for nightmares. Continue to monitor symptoms, side effects and response to medications. See back in one week. Electronically signed by JAYCOB Zuniga CNP on 10/6/2022 at 10:55 AM

## 2022-10-10 ENCOUNTER — APPOINTMENT (OUTPATIENT)
Dept: PSYCHIATRY | Age: 22
End: 2022-10-10
Payer: COMMERCIAL

## 2022-10-10 ENCOUNTER — HOSPITAL ENCOUNTER (OUTPATIENT)
Dept: PSYCHIATRY | Age: 22
Setting detail: THERAPIES SERIES
Discharge: HOME | End: 2022-10-10
Payer: COMMERCIAL

## 2022-10-10 PROCEDURE — 80305 DRUG TEST PRSMV DIR OPT OBS: CPT

## 2022-10-10 PROCEDURE — H0015 ALCOHOL AND/OR DRUG SERVICES: HCPCS

## 2022-10-10 NOTE — GROUP NOTE
Start Time: 56 AM  End Time: 12:00 PM   Number of participants:4  Type of group: Relapse Prevention  Mode of intervention: Education, Support, Socialization, Exploration, Clarifying, Problem-solving, Media, Confrontation, Limit-setting, and Reality-testing  Topic: Support Network  Objective: Identify ways a support network can assist the client in his /her efforts obtaining & maintaining sobriety. Note: Client actively participated in the group session. She was assigned to view the film\" When Love is not Enough\". Client viewed education and was able to identify 2-4 ways how getting active in [address] can assist with staying sober. She shared her own experiences of how talking with other     \" addicts\" in recovery is motivating to her sobriety. Status after intervention:Improved  Participation level: Active Listener and Interactive  Participation Quality: Appropriate, Attentive, Sharing, and Supportive  Speech: normal  Thought Process/Content:Logical  Mood/Affect: brightens  Self report: None Reported  Response to learning: Able to verbalize current knowledge/experience, Able to verbalize/acknowledge new learning, Able to retain information, Capable of insight, and Able to change behavior  Discipline Responsible: /Counselor    [x] Check in completed and reviewed. Intervention required.  No    Electronically signed by Kristyn Lopez [place] [address] on 61/37/4150 at 3:36 PM

## 2022-10-10 NOTE — GROUP NOTE
Date: 10-10-22  Start Time: 9:00  End Time: 10:15  Number of participants : 4  Type of group: Psychotherapy  Mode of intervention: Universality, Instillation of Hope, Altruism, Correction of the primary family, imparting of information and group cohesiveness. Topic: Triggers that cause us to use  Objective : To use critical thinking related to the cause of TIMBO    Note: Jesús Franco was the focus of the group. Her past trauma and living with her mother have been triggers for her to use. Her mother has been living with her part time which presents an uncomfortable circumstance. Her mother has a paramour and his three children staying at her house and she uses Chaparrita's home as a respite. This has been interfering with RADHA's life and intruding on her personal life. She is going to tell her mother to move out for the sake of her maintaining her sobriety and for the hope of gaining custody of her children. She was instrumental in supporting another peer who is struggling with his sobriety. Status after intervention:Improved  Participation level: Active Listener and Interactive  Participation Quality: Appropriate, Attentive, Sharing, and Supportive  Speech: normal  Thought Process/Content:Logical  Mood/Affect: brightens, calm, and congruent  Self report: None Reported  Response to learning: Able to verbalize current knowledge/experience, Able to verbalize/acknowledge new learning, Able to retain information, Capable of insight, Able to change behavior, and Progressing to goal  Discipline Responsible: /Counselor    [x] Check in completed and reviewed. Intervention required.  no       Electronically signed by ROBERT Garber, ANDREE on 10/10/2022 at 12:41 PM

## 2022-10-10 NOTE — PLAN OF CARE
Client discussed in treatment team today. Present: Dr. Vika Simons [place] [address], Pretty Ramey, Lorene PENG, Jim Trejo MSN, STIVEN, Megan Adames. Current Status: Caroline Sandhu went back to IOP per court recommendation    Treatment goals:    [] Relapse prevention  [x] Increase sober support  [] Increase attendance in sober support groups  [x] [place] effectively with cravings and urges  [x] Other:     Recommendation: To continue with IOP and stabilize her home by having others not on the lease to find housing elsewhere as to not disrupt her sobriety.       Electronically signed by ROBERT Dewey, OFEW on 10/10/2022 at 2:36 PM

## 2022-10-11 ENCOUNTER — APPOINTMENT (OUTPATIENT)
Dept: PSYCHIATRY | Age: 22
End: 2022-10-11
Payer: COMMERCIAL

## 2022-10-11 ENCOUNTER — HOSPITAL ENCOUNTER (OUTPATIENT)
Dept: PSYCHIATRY | Age: 22
Setting detail: THERAPIES SERIES
Discharge: HOME | End: 2022-10-11
Payer: COMMERCIAL

## 2022-10-11 PROCEDURE — H0015 ALCOHOL AND/OR DRUG SERVICES: HCPCS

## 2022-10-11 NOTE — GROUP NOTE
Start Time: 65 AM  End Time[de-identified] 12:00 PM  Number of participants:4  Type of group: Relapse Prevention  Mode of intervention: Education, Support, Socialization, Exploration, Clarifying, Problem-solving, Media, Confrontation, Limit-setting, and Reality-testing  Topic: Craving & Relapse  Objective:  Explore coping skills to aid preventing relapse behaviors to support the recovery process. Note: Client actively participated in the group session. She was assigned to view education on Craving & Relapse. Client related to the education and identified and processed coping skills of setting boundaries with peers that use substances as a coping skill she can use to prevent relapse behaviors. Client was somewhat disruptive by making noises and being on her cell phone while other group members were talking and she was made ware that's disrespectful to her peers and told to stop her disruptive behaviors. Status after intervention:Improved  Participation level: Active Listener and Interactive  Participation Quality: Appropriate, Sharing, and Intrusive  Speech: normal  Thought Process/Content:Logical  Mood/Affect: brightens  Self report: None Reported  Response to learning: Able to verbalize current knowledge/experience, Able to verbalize/acknowledge new learning, Able to retain information, and Capable of insight  Discipline Responsible: /Counselor    [x] Check in completed and reviewed. Intervention required.  No    Electronically signed by Kristyn Hines [place] [address] on 80/01/8646 at 1:44 PM

## 2022-10-11 NOTE — PLAN OF CARE
[facility]- Level of Care Placement      []Admissions  []Continued Stay [x]Discharge/Transfer / Complication in [address] RNLV:[id number]/03/5720    Client Sticker      Level of Care Level 1      Outpatient Services Level 2.1   Intensive Outpatient Services(IOP) Level 2.5   Partial Hospitalization Services Level 3.1 CLINICALLY Managed Low-Intensity Residential Services Level 3.3  CLINICALLY Managed Population- Specific High- Intensity Residential Services Level 3.5  CLINICALLY Managed High Intensive Residential Services Level 3.7  MEDICALLY Monitored Intensive Inpatient Services Level 4  MEDICALLY Managed Intensive Inpatient Services   Dimension 1  Acute Intoxication and/or Withdrawal Potential [x] Not experiencing significant withdrawal    [] Minimal  risk of severe  withdrawal [] Minimal  risk of severe  withdrawal    [] Manageable  at Level 2-WM [] Moderate  risk of severe withdrawal    [] Manageable at Level 2-WM [] No withdrawal risk or minimal or stable withdrawal     [] Concurrently receiving Level -WM or Level 2-WM services [] Minimal risk of severe withdrawal    [] If withdrawal is present, manageable at Level 3. 2-WM  [] Minimal risk of severe withdrawal    [] If withdrawal is present manageable at Level 3. 2-WM [] High risk of withdrawal, but manageable at Level  3.7-WM and does not require  full resources of a licensed hospital [] At high risk of withdrawal and requires Level 4- and full resources of licensed hospital    COMMENTS:           Dimension 2   Biomedical Conditions and Complications  (BMC/C) [x] None or very stable    [] Receiving concurrent medical monitoring  [] None or not a distraction from treatment    [] Problems are manageable at Level 2.1 [] None or not sufficient to distract from treatment    [] Problems are manageable at  Level 2.5 [] None or stable    [] Receiving concurrent medical monitoring  [] None or stable    [] Receiving concurrent medical monitoring  [] None or stable    [] Receiving concurrent medical monitoring [] Requires 24-hour medical monitoring  but not intensive treatment [] Requires 24-hour medical & nursing care and the full  resources of a licensed hospital   COMMENTS:           Dimension 3  Emotional,  Behavioral,  or Cognitive Conditions and Complications   (EBC/C) [] None or very stable    [x] Receiving concurrent mental health monitoring [] Mild severity  with potential to distract from recovery; needs monitoring [] Mild to moderate severity, with potential to distract from recovery, needs stabilization [] None or minimal; not distracting to recovery    [] If  stable, a    co-occurring capable program is appropriate    [] If not stable, a co-occurring enhanced program is required [] Mild to moderate severity; needs structure to focus on recovery. Treatment should be designed to address significant cognitive deficits     [] If  stable, a  co-occurring capable program is appropriate    [] If not stable, a co-occurring enhanced program is required [] Demonstrates repeated inability to control impulses or unstable & dangerous signs/ symptoms require stabilization. Other functional deficits require stabilization and 24-hr.  setting to prepare for community integration  & continuing care    [] Co-occurring enhanced setting is required for those with severe & chronic mental illness [] Moderate severity;  needs 24-hour   structured setting    [] If client has co-occurring mental disorder, requires concurrent mental health services in a medically monitored setting  [] Severe and unstable problems;  requires 24-hour psychiatric care  with concomitant addiction treatment   COMMENTS:             Staff: Robert Joya Date:10/11/2022                   [address] Placement      []Admissions  []Continued Stay [x]Discharge/Transfer / Complication in [address] [place] [address] MNCN:[id number]/80/[id number]    Client Sticker      Level of Care Level 1  Outpatient   Services Level 2.1  Intensive  Outpatient  Services Level 2.5  Partial Hospitalization Services Level 3.1  CLINICALLY Managed Low-intensity Residential Services Level 3.3  CLINICALLY Managed Population- Specific High- Intensity Residential Services Level 3.5  CLINICALLY Managed High-Intensive Residential Services Level 3.7  MEDICALLY Monitored Intensive Inpatient Services Level 4  MEDICALLY Managed Intensive Inpatient Services   Dimension 4  Readiness  To  Change [] Ready for recovery but needs motivating and monitoring strategies to strengthen readiness    []Needs ongoing monitoring and disease management    [] High severity in this dimension but not in other dimensions. Needs Level 1 motivational enhancement strategies   [x] Has variable engagement in treatment, ambivalence, or lack of awareness of substance use or mental health problems and requires structured program several times/wk. to promote progress through stages of change [] Has poor engagement in treatment, significant ambivalence, or lack of awareness of substance use or mental health problems, requires near daily structured program or intensive engagement to promote progress through stages of change [] Open to recovery, but needs structured environment to maintain therapeutic gains [] Has little awareness & needs interventions at Level 3.3 to engage & stay in treatment.     [] If there is high severity in this dimension, but not in any other dimension, motivational enhancement strategies should be provided in Level 1 [] Has marked difficulty with, or opposition to treatment with dangerous consequences    [] If there is high severity in this dimension, but not in any other dimension, motivational enhancement strategies should be provided in Level 1 [] Low interest in treatment and impulse control is poor despite negative consequences;  needs motivating strategies only safely available in 24-hour structured setting    [] If there is high severity in this dimension, but not in any other dimension, motivational enhancement strategies should be provided in Level 1 [] Problems in this dimension do not qualify the client for Level 4 services    [] If the client's only severity is in Dimension 4,5, and/or 6 without high severity in Dimensions 1,2, and/or 3, then client is not qualified for Level 4   COMMENTS:           Dimension 5  Relapse, Continued Use or Continued Problem Potential  [] Able to maintain abstinence or control use and/or addictive behaviors  and pursue recovery or motivational goals with minimal support [x] Intensification of addiction or mental health symptoms indicate high likelihood of relapse risk or continued use or continued problems without  close monitoring and support several times/wk.  [] Intensification of addiction or mental health symptoms, despite active participation in a Level 1 or 2.1 program, indicates high likelihood of relapse or continued use or continued problems without near-daily monitoring [] Understands relapse but needs structure to maintain therapeutic gains  [] Has little awareness and needs interventions available only at Level 3.3 to prevent continued use, with imminent dangerous consequences, because of cognitive deficits or  comparable dysfunction  [] Has no recognition  of the skills needed to prevent continued use,  with imminently dangerous  consequences [] Unable to control use, with imminently dangerous consequences,  despite active participation at less intensive levels of care [] Problems in this dimension do not qualify the client for Level 4 services    [] If the client's only severity is in Dimension 4,5, and/or 6 without high severity in Dimensions 1,2, and/or 3, then client is not qualified for Level 4   COMMENTS:           Dimension 6  Recovery/Living Environment [x]  Recovery environment is supportive and/or the client has skills to cope [] Recovery environment is not supportive  but with structure and support, the client can cope [] Recovery environment is not supportive, but with structure and support and relief from the home environment, client can cope [] Environment    is dangerous, but recovery is achievable if Level 3.1 24-hour structure is available  [] Environment is dangerous and  client needs 24-hour structure to learn to cope [] Environment is dangerous, and the client lacks skills to cope outside of a  highly structured 24-hour setting   [] Environment is dangerous, and the client lacks skills to cope outside of a  highly structured 24-hour setting [] Problems in this dimension do not qualify the client for Level 4 services    [] If the client's only severity is in Dimension 4,5, and/or 6 without high severity in Dimensions 1,2, and/or 3, then client is not qualified for Level 4   COMMENTS:               Staff: Jovany Price  Date:10/11/2022

## 2022-10-11 NOTE — GROUP NOTE
Date: 10-11-22  Start Time: 9:00  End Time: 10:15  Number of participants: 4  Type of Group: psychotherapy  Mode of intervention: existential factors, instillation of hope, altruism, group cohesiveness, imparting information and universality. Topic: exploring triggers, past use and emotional triggers  Objective: critical thinking related to use. Note: RADHA noted that she has been attending meetings. She had asked her mother not to frequent her home to stay to get away from her paramour and his 3 kids. Mother is a trigger for her to use when tensions rise. She like wise asked the male who had been living at her home to leave as well which he did recently. She has a sponsor. She had her intake for a counselor and she begins [address] treatment next week. She denied urges to use , but emotions and stress can effect her response. The group focused on poly vagal techniques to help keep us calm stressful situations as to not to respond to stress or emotion in a recklessly manner. Gus Adams was active in group with self disclosure and helping to support peers. Status after intervention:Improved  Participation level: Active Listener and Interactive  Participation Quality: Appropriate, Attentive, and Sharing  Speech: normal  Thought Process/Content:Logical  Mood/Affect: calm and congruent  Self report: None Reported  Response to learning: Able to verbalize current knowledge/experience, Able to verbalize/acknowledge new learning, Able to retain information, Capable of insight, Able to change behavior, and Progressing to goal  Discipline Responsible: /Counselor    [x] Check in completed and reviewed. Intervention required.  no         Electronically signed by ROBERT Corrales, ANDREE on 10/11/2022 at 2:23 PM

## 2022-10-12 ENCOUNTER — APPOINTMENT (OUTPATIENT)
Dept: PSYCHIATRY | Age: 22
End: 2022-10-12
Payer: COMMERCIAL

## 2022-10-12 NOTE — PLAN OF CARE
[facility]- Level of Care Placement      [x]Admissions  []Continued Stay []Discharge/Transfer / Complication in [address] [place] [address] [id number]    Client Sticker      Level of Care Level 1      Outpatient Services Level 2.1   Intensive Outpatient Services(IOP) Level 2.5   Partial Hospitalization Services Level 3.1 CLINICALLY Managed Low-Intensity Residential Services Level 3.3  CLINICALLY Managed Population- Specific High- Intensity Residential Services Level 3.5  CLINICALLY Managed High Intensive Residential Services Level 3.7  MEDICALLY Monitored Intensive Inpatient Services Level 4  MEDICALLY Managed Intensive Inpatient Services   Dimension 1  Acute Intoxication and/or Withdrawal Potential [x] Not experiencing significant withdrawal    [] Minimal  risk of severe  withdrawal [] Minimal  risk of severe  withdrawal    [] Manageable  at Level 2-WM [] Moderate  risk of severe withdrawal    [] Manageable at Level 2-WM [] No withdrawal risk or minimal or stable withdrawal     [] Concurrently receiving Level -WM or Level 2-WM services [] Minimal risk of severe withdrawal    [] If withdrawal is present, manageable at Level 3. 2-WM  [] Minimal risk of severe withdrawal    [] If withdrawal is present manageable at Level 3. 2-WM [] High risk of withdrawal, but manageable at Level  3.7-WM and does not require  full resources of a licensed hospital [] At high risk of withdrawal and requires Level 4-WM and full resources of licensed hospital    COMMENTS:           Dimension 2   Biomedical Conditions and Complications  (BMC/C) [x] None or very stable    [] Receiving concurrent medical monitoring  [] None or not a distraction from treatment    [] Problems are manageable at Level 2.1 [] None or not sufficient to distract from treatment    [] Problems are manageable at  Level 2.5 [] None or stable    [] Receiving concurrent medical monitoring  [] None or stable    [] Receiving concurrent medical monitoring  [] None or stable    [] Receiving concurrent medical monitoring [] Requires 24-hour medical monitoring  but not intensive treatment [] Requires 24-hour medical & nursing care and the full  resources of a licensed hospital   COMMENTS:           Dimension 3  Emotional,  Behavioral,  or Cognitive Conditions and Complications   (EBC/C) [] None or very stable    [] Receiving concurrent mental health monitoring [x] Mild severity  with potential to distract from recovery; needs monitoring [] Mild to moderate severity, with potential to distract from recovery, needs stabilization [] None or minimal; not distracting to recovery    [] If  stable, a    co-occurring capable program is appropriate    [] If not stable, a co-occurring enhanced program is required [] Mild to moderate severity; needs structure to focus on recovery. Treatment should be designed to address significant cognitive deficits     [] If  stable, a  co-occurring capable program is appropriate    [] If not stable, a co-occurring enhanced program is required [] Demonstrates repeated inability to control impulses or unstable & dangerous signs/ symptoms require stabilization. Other functional deficits require stabilization and 24-hr.  setting to prepare for community integration  & continuing care    [] Co-occurring enhanced setting is required for those with severe & chronic mental illness [] Moderate severity;  needs 24-hour   structured setting    [] If client has co-occurring mental disorder, requires concurrent mental health services in a medically monitored setting  [] Severe and unstable problems;  requires 24-hour psychiatric care  with concomitant addiction treatment   COMMENTS:             Electronically signed by Kristyn Fernandez [place] 320 on 98/11/3263 at 10:48 AM                  [address] Placement      [x]Admissions  []Continued Stay []Discharge/Transfer / Complication in [place] Date:10/12/2022    Client Sticker      Level of Care Level 1  Outpatient   Services Level 2.1  Intensive  Outpatient  Services Level 2.5  Partial Hospitalization Services Level 3.1  CLINICALLY Managed Low-intensity Residential Services Level 3.3  CLINICALLY Managed Population- Specific High- Intensity Residential Services Level 3.5  CLINICALLY Managed High-Intensive Residential Services Level 3.7  MEDICALLY Monitored Intensive Inpatient Services Level 4  MEDICALLY Managed Intensive Inpatient Services   Dimension 4  Readiness  To  Change [] Ready for recovery but needs motivating and monitoring strategies to strengthen readiness    []Needs ongoing monitoring and disease management    [] High severity in this dimension but not in other dimensions. Needs Level 1 motivational enhancement strategies   [x] Has variable engagement in treatment, ambivalence, or lack of awareness of substance use or mental health problems and requires structured program several times/wk. to promote progress through stages of change [] Has poor engagement in treatment, significant ambivalence, or lack of awareness of substance use or mental health problems, requires near daily structured program or intensive engagement to promote progress through stages of change [] Open to recovery, but needs structured environment to maintain therapeutic gains [] Has little awareness & needs interventions at Level 3.3 to engage & stay in treatment.     [] If there is high severity in this dimension, but not in any other dimension, motivational enhancement strategies should be provided in Level 1 [] Has marked difficulty with, or opposition to treatment with dangerous consequences    [] If there is high severity in this dimension, but not in any other dimension, motivational enhancement strategies should be provided in Level 1 [] Low interest in treatment and impulse control is poor despite negative consequences;  needs motivating strategies only safely available in 24-hour structured setting    [] If there is high severity in this dimension, but not in any other dimension, motivational enhancement strategies should be provided in Level 1 [] Problems in this dimension do not qualify the client for Level 4 services    [] If the client's only severity is in Dimension 4,5, and/or 6 without high severity in Dimensions 1,2, and/or 3, then client is not qualified for Level 4   COMMENTS:           Dimension 5  Relapse, Continued Use or Continued Problem Potential  [] Able to maintain abstinence or control use and/or addictive behaviors  and pursue recovery or motivational goals with minimal support [x] Intensification of addiction or mental health symptoms indicate high likelihood of relapse risk or continued use or continued problems without  close monitoring and support several times/wk.  [] Intensification of addiction or mental health symptoms, despite active participation in a Level 1 or 2.1 program, indicates high likelihood of relapse or continued use or continued problems without near-daily monitoring [] Understands relapse but needs structure to maintain therapeutic gains  [] Has little awareness and needs interventions available only at Level 3.3 to prevent continued use, with imminent dangerous consequences, because of cognitive deficits or  comparable dysfunction  [] Has no recognition  of the skills needed to prevent continued use,  with imminently dangerous  consequences [] Unable to control use, with imminently dangerous consequences,  despite active participation at less intensive levels of care [] Problems in this dimension do not qualify the client for Level 4 services    [] If the client's only severity is in Dimension 4,5, and/or 6 without high severity in Dimensions 1,2, and/or 3, then client is not qualified for Level 4   COMMENTS:           Dimension 6  Recovery/Living Environment []  Recovery environment is supportive and/or the client has skills to cope [x] Recovery environment is not supportive  but with structure and support, the client can cope [] Recovery environment is not supportive, but with structure and support and relief from the home environment, client can cope [] Environment    is dangerous, but recovery is achievable if Level 3.1 24-hour structure is available  [] Environment is dangerous and  client needs 24-hour structure to learn to cope [] Environment is dangerous, and the client lacks skills to cope outside of a  highly structured 24-hour setting   [] Environment is dangerous, and the client lacks skills to cope outside of a  highly structured 24-hour setting [] Problems in this dimension do not qualify the client for Level 4 services    [] If the client's only severity is in Dimension 4,5, and/or 6 without high severity in Dimensions 1,2, and/or 3, then client is not qualified for Level 4   COMMENTS:               Electronically signed by Kristyn [address] on 59/92/0181 at 10:49 AM

## 2022-10-13 ENCOUNTER — HOSPITAL ENCOUNTER (OUTPATIENT)
Dept: PSYCHIATRY | Age: 22
Setting detail: THERAPIES SERIES
Discharge: HOME | End: 2022-10-13
Payer: COMMERCIAL

## 2022-10-13 ENCOUNTER — APPOINTMENT (OUTPATIENT)
Dept: PSYCHIATRY | Age: 22
End: 2022-10-13
Payer: COMMERCIAL

## 2022-10-13 PROCEDURE — H0015 ALCOHOL AND/OR DRUG SERVICES: HCPCS

## 2022-10-13 PROCEDURE — 99214 OFFICE O/P EST MOD 30 MIN: CPT

## 2022-10-13 RX ORDER — HYDROXYZINE PAMOATE 25 MG/1
25 CAPSULE ORAL 2 TIMES DAILY PRN
Qty: 60 CAPSULE | Refills: 0 | Status: SHIPPED | OUTPATIENT
Start: 2022-10-13 | End: 2022-11-12

## 2022-10-13 RX ORDER — ESCITALOPRAM OXALATE 20 MG/1
20 TABLET ORAL DAILY
Qty: 30 TABLET | Refills: 0 | Status: SHIPPED | OUTPATIENT
Start: 2022-10-13 | End: 2022-11-03

## 2022-10-13 NOTE — GROUP NOTE
Start Time:900 AM  End Time : 1030 AM  Number of participants:5  Type of group: Psychotherapy  Mode of intervention: Education, Support, Socialization, Exploration, Clarifying, Problem-solving, Confrontation, Limit-setting, and Reality-testing  Topic: Challenges in Recovery   Objective: Explore how clients are coping with struggles to aid recovery and process coping skills. Note: Client actively participant in the group session. Client presented for group 20 minutes late. Once in group she was not focused and had to be re-directed to put her cell phone away. Carmen Celestin is regressing based in her insight and not taking ownership related to her own actions of why she is back in the IOP program. Client goes back and forth with insight and at times Carmen Celestin presents as if she is stating what she thinks others what to hear instead of being honest.      Status after intervention:Decompensated  Participation level: Active Listener and Interactive  Participation Quality: Sharing and Resistant  Speech: normal  Thought Process/Content:Logical  Mood/Affect: anxious and suspicious  Self report: None Reported  Response to learning: Resistant  Discipline Responsible: /Counselor    [x] Check in completed and reviewed. Intervention required.  No    Electronically signed by Kristyn Segovia [place] 320 on 91/41/6349 at 2:47 PM

## 2022-10-13 NOTE — PROGRESS NOTES
PSYCHIATRY ATTENDING NOTE:    CC: \"Feeling pretty good. \"    S: Patient being seen at [facility] in follow-up for polysubstance abuse, depression and anxiety. Met with patient to discuss progress with treatment. Also discussed patient with treatment team. Patient reports she is sleep walking some and having some dreams. \"I feel like that comes with my stress. \" States she is under stress with the courts and certain relationships. \" Medications seem to be working. Depression is a lot better and she states anxiety is better. James Lacey reports the Vistaril helps. Groups are doing well and learning how to manage stress and boundaries. She is not suicidal ,homicidal, manic or psychotic. No acute issues or concerns. MSE: Jorge A Duggan female appears age. Pleasant, cooperative, forthcoming. Normal psychomotor activity, gait, strength, tone, eye contact. Mood depressed. Affect congruent. Speech clear. Thoughts organized. Content future-oriented. No suicidal or homicidal ideations. No paranoia, delusions or hallucinations. Orientation, concentration, recent and remote memory are grossly intact. Fund of knowledge fair. Language use fair. Insight and judgment fair. MEDICATIONS:   Lexapro 20 mg nightly (cont)                                      Minipress 1 mg nightly (cont)                                      Vistaril 25 mg bid prn (cont)    ASSESSMENT:  Major Depression Disorder, recurrent moderate                                      Borderline Personality Disorder                                      Polysubstance Abuse       PLAN: Patient has been transferred from aftercare back to [facility] program. Support, reassurance and psycho-education provided. She states the Lexapro increase and minipress have been helpful. Continue to monitor symptoms, side effects and response to medications. See back in one week.                           Electronically signed by JAYCOB Jordan CNP on 10/13/2022 at 9:26 AM

## 2022-10-13 NOTE — GROUP NOTE
Start Time: 65 AM  End Time: 12:00 PM  Number of participants:5  Type of group: Recovery  Mode of intervention: Education, Support, Socialization, Exploration, Clarifying, Problem-solving, Activity, Confrontation, Limit-setting, and Reality-testing  Topic: Recovery and sober lifestyle changes  Objective:  Explore coping skills and life changes made by  to gain insight and learn from experiences. Note: Client actively participated in the group session. Peer supporter came to present his story of recovery. Client actively listened and was able to verbalize 1-2 tools and insight she can utilize from the presentation to aid her recovery process. Status after intervention:Improved  Participation level: Active Listener and Interactive  Participation Quality: Appropriate, Attentive, Sharing, and Supportive  Speech: normal  Thought Process/Content:Logical  Mood/Affect: brightens  Self report: None Reported  Response to learning: Able to verbalize current knowledge/experience, Able to verbalize/acknowledge new learning, Able to retain information, Capable of insight, Able to change behavior, and Progressing to goal  Discipline Responsible: /Counselor    [x] Check in completed and reviewed. Intervention required.  No    Electronically signed by Sonny Way on 94/34/3482 at 3:13 PM

## 2022-10-17 ENCOUNTER — APPOINTMENT (OUTPATIENT)
Dept: PSYCHIATRY | Age: 22
End: 2022-10-17
Payer: COMMERCIAL

## 2022-10-17 ENCOUNTER — HOSPITAL ENCOUNTER (OUTPATIENT)
Dept: PSYCHIATRY | Age: 22
Setting detail: THERAPIES SERIES
Discharge: HOME | End: 2022-10-17
Payer: COMMERCIAL

## 2022-10-17 NOTE — FLOWSHEET NOTE
Client called off due reporting her mother had a flat tire and client was provided care source number to set up tranportation with her insirance to avoid missing her IOP.     Electronically signed by Kristyn Graf [place] 320 on 99/32/2742 at 1:56 PM

## 2022-10-18 ENCOUNTER — APPOINTMENT (OUTPATIENT)
Dept: PSYCHIATRY | Age: 22
End: 2022-10-18
Payer: COMMERCIAL

## 2022-10-18 ENCOUNTER — HOSPITAL ENCOUNTER (OUTPATIENT)
Dept: PSYCHIATRY | Age: 22
Setting detail: THERAPIES SERIES
Discharge: HOME | End: 2022-10-18
Payer: COMMERCIAL

## 2022-10-18 PROCEDURE — 80305 DRUG TEST PRSMV DIR OPT OBS: CPT

## 2022-10-18 PROCEDURE — H0015 ALCOHOL AND/OR DRUG SERVICES: HCPCS

## 2022-10-18 NOTE — GROUP NOTE
Start Time: 65 AM  End Time : 12:00 PM   Number of participants:4  Type of group: Relapse Prevention  Mode of intervention: Education, Support, Socialization, Exploration, Clarifying, Problem-solving, Media, Confrontation, Limit-setting, and Reality-testing  Topic: Relapse prevention   Objective: Identifying triggers that lead to relapse behaviors and develop coping skills. Note: Client actively participated in the group session. Client was assigned to view education \" March Towards Sobriety\". From the education client was able to identify and process 2-4 triggers and identify and verbalize 4-5 coping skills she can apply to maintain her sobriety. Status after intervention:Improved  Participation level: Active Listener and Interactive  Participation Quality: Appropriate, Attentive, Sharing, and Supportive  Speech: normal  Thought Process/Content:Logical  Mood/Affect: brightens  Self report: None Reported  Response to learning: Able to verbalize current knowledge/experience, Able to verbalize/acknowledge new learning, Able to retain information, Capable of insight, and Able to change behavior  Discipline Responsible: /Counselor    [x] Check in completed and reviewed. Intervention required.  No  Electronically signed by Sonny Aldrich on 77/52/4604 at 2:57 PM

## 2022-10-18 NOTE — GROUP NOTE
Date:10-18-22  Start Time: 9:00  End Time: 10:15  Number of participants: 4  Type of group: Psychotherapy  Mode of intervention: Instillation of hope, universality, interpersonal learning, existential factors, group cohesiveness, and catharsis. Topic: assessing our sobriety at this time  Objective: To evaluate the history of use      Note: Anette Merrill was active in group. She helped to support others in group. She refected on her past use when she snorted Heroin and she had a bad reaction to the substance. She felt that she was close to passing out, but was able to stay awake by taking a cold bath. Her mother is her only support and their relations is frayed. She no longer has the male peer living in her home, and she has no relationship with him. She has been making meetings weekly. She has a sponsor she has been working with. She verbalized her mother no longer resides with her, and mother is no longer a distraction for her. She begins trauma therapy tomorrow. The group focused on three types of people who may use, one is unresolved trauma, the second is unresolved mood disorder and the third is untreated ADHD. The group also focused on peers who used and they became physically and psychologically dependent on substances. Status after intervention:Improved  Participation level: Active Listener and Interactive  Participation Quality: Appropriate, Attentive, Sharing, and Supportive  Speech: normal  Thought Process/Content:Logical  Mood/Affect: calm and congruent  Self report: None Reported  Response to learning: Able to verbalize current knowledge/experience, Able to verbalize/acknowledge new learning, Able to retain information, Capable of insight, Able to change behavior, and Progressing to goal  Discipline Responsible: /Counselor    [x] Check in completed and reviewed. Intervention required.  No    Electronically signed by ROBERT José, ANDREE on 10/18/2022 at 12:10 PM

## 2022-10-19 ENCOUNTER — APPOINTMENT (OUTPATIENT)
Dept: PSYCHIATRY | Age: 22
End: 2022-10-19
Payer: COMMERCIAL

## 2022-10-20 ENCOUNTER — HOSPITAL ENCOUNTER (OUTPATIENT)
Dept: PSYCHIATRY | Age: 22
Setting detail: THERAPIES SERIES
Discharge: HOME | End: 2022-10-20
Payer: COMMERCIAL

## 2022-10-20 ENCOUNTER — APPOINTMENT (OUTPATIENT)
Dept: PSYCHIATRY | Age: 22
End: 2022-10-20
Payer: COMMERCIAL

## 2022-10-20 PROBLEM — F19.20 CHEMICAL DEPENDENCY (HCC): Status: ACTIVE | Noted: 2022-10-20

## 2022-10-20 PROCEDURE — H0015 ALCOHOL AND/OR DRUG SERVICES: HCPCS

## 2022-10-20 PROCEDURE — 99214 OFFICE O/P EST MOD 30 MIN: CPT
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
## 2022-10-20 NOTE — PSYCHOTHERAPY
Start Time: 0900  End Time:   0945  Number of participants: 9  Type of group: Nutrition for mental health  Mode of intervention: Education, Support, Socialization, Exploration, Clarifying, Problem-solving. Topic: Anxiety and depression nutritional group  Objective: To explore food to eat that can help with anxiety, depression, substance dependence and inflammation to help with mental health. Note: Client actively participated in the group session. Food was presented and information given related to anxiety, depression and inflammation. Client thoughts and feelings toward nutrition were explored. Client was receptive to information presented. Status after intervention:  Improved  Participation level; Active listener and interactive  Participation Quality:  Appropriate, Attentive and Sharing  Speech:  Normal  Thoughts Process/Content:  Logical  Mood/Affect:  Euthymic/ Bright  Self-Report: None Reported  Response to learning:  Able to verbalize current knowledge/experience. Able to verbalize new learning. Was able to retain information and capable of insight.     F19.20 Substance dependence    Discipline Responsible:  TREMAYNE

## 2022-10-20 NOTE — PROGRESS NOTES
team.    Time spent 25 minutes                          Electronically signed by JAYCOB Fuentes CNP on 10/20/2022 at 9:31 AM

## 2022-10-20 NOTE — GROUP NOTE
Start Time 900 AM  End Time: 1030 AM   Number of participants:7  Type of group: Psychotherapy  Mode of intervention: Education, Support, Socialization, Exploration, Clarifying, Problem-solving, Media, Confrontation, Limit-setting, and Reality-testing  Topic: Problem solving skills  Objective:  Explore and process coping skills that can be utilized to solve problems that support maintaining sobriety. Note: Client participated in the group session. Client was asked to identify and explore 1-2 coping skills that he issuing to solve challenges in recovery without the use of substances. Client shared how being around positive sober support has given her hope in her recovery and demonstarted to her that she is not the only person struggling and she verbalized motivation and willingness to stay sober. In addtion , client shared struggles of having dreams of substance use and at times feeling live giving up , however she describes how her AA/NA meetings are helpful. Status after intervention:Improved  Participation level: Active Listener and Interactive  Participation Quality: Appropriate, Attentive, Sharing, and Supportive  Speech: normal  Thought Process/Content:Logical  Mood/Affect: brightens  Self report: None Reported  Response to learning: Able to verbalize current knowledge/experience, Able to verbalize/acknowledge new learning, Able to retain information, Capable of insight, Able to change behavior, and Progressing to goal  Discipline Responsible: /Counselor    [x] Check in completed and reviewed. Intervention required.  No    Electronically signed by Kristyn Rosales [place] 320 on 60/27/6769 at 2:32 PM

## 2022-10-24 ENCOUNTER — APPOINTMENT (OUTPATIENT)
Dept: PSYCHIATRY | Age: 22
End: 2022-10-24
Payer: COMMERCIAL

## 2022-10-24 ENCOUNTER — HOSPITAL ENCOUNTER (OUTPATIENT)
Dept: PSYCHIATRY | Age: 22
Setting detail: THERAPIES SERIES
Discharge: HOME | End: 2022-10-24
Payer: COMMERCIAL

## 2022-10-24 PROCEDURE — 80305 DRUG TEST PRSMV DIR OPT OBS: CPT

## 2022-10-24 PROCEDURE — H0015 ALCOHOL AND/OR DRUG SERVICES: HCPCS
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
## 2022-10-24 NOTE — GROUP NOTE
Start Time: 65 AM  End Time: 12: 00 PM  Number of participants:5  Type of group: Psychoeducation  Mode of intervention: Education, Support, Socialization, Exploration, Clarifying, Problem-solving, Confrontation, Limit-setting, and Reality-testing  Topic: HIV Prevention / STDS education   Objective: Increase clients awareness and knowledge related to sexually transmitted diseases and HIV risk when under the influence of substance use. Note: Client actively participated in the education session. Client was presented with information from [facility] related to HIV prevention &  STD's. Client actively listened, asked questions and she was able to verbalize 1-2 insights gained from education to prevent risk of STD's and HIV. Status after intervention:Improved  Participation level: Active Listener and Interactive  Participation Quality: Appropriate, Attentive, Sharing, and Supportive  Speech: normal  Thought Process/Content:Logical  Mood/Affect: brightens  Self report: None Reported  Response to learning: Able to verbalize current knowledge/experience, Able to verbalize/acknowledge new learning, Able to retain information, Capable of insight, Able to change behavior, and Progressing to goal  Discipline Responsible: /Counselor    [x] Check in completed and reviewed. Intervention required.  No    Electronically signed by Kristyn Hicks [place] 320 on 91/46/5074 at 2:07 PM

## 2022-10-25 ENCOUNTER — HOSPITAL ENCOUNTER (OUTPATIENT)
Dept: PSYCHIATRY | Age: 22
Setting detail: THERAPIES SERIES
Discharge: HOME | End: 2022-10-25
Payer: COMMERCIAL

## 2022-10-25 ENCOUNTER — APPOINTMENT (OUTPATIENT)
Dept: PSYCHIATRY | Age: 22
End: 2022-10-25
Payer: COMMERCIAL

## 2022-10-25 PROCEDURE — H0015 ALCOHOL AND/OR DRUG SERVICES: HCPCS

## 2022-10-25 NOTE — PLAN OF CARE
[facility]- Level of Care Placement      []Admissions  [x]Continued Stay []Discharge/Transfer / Complication in [facility]:82/62/7898    Client Sticker      Level of Care Level 1      Outpatient Services Level 2.1   Intensive Outpatient Services(IOP) Level 2.5   Partial Hospitalization Services Level 3.1 CLINICALLY Managed Low-Intensity Residential Services Level 3.3  CLINICALLY Managed Population- Specific High- Intensity Residential Services Level 3.5  CLINICALLY Managed High Intensive Residential Services Level 3.7  MEDICALLY Monitored Intensive Inpatient Services Level 4  MEDICALLY Managed Intensive Inpatient Services   Dimension 1  Acute Intoxication and/or Withdrawal Potential [x] Not experiencing significant withdrawal    [] Minimal  risk of severe  withdrawal [] Minimal  risk of severe  withdrawal    [] Manageable  at Level 2-WM [] Moderate  risk of severe withdrawal    [] Manageable at Level 2-WM [] No withdrawal risk or minimal or stable withdrawal     [] Concurrently receiving Level -WM or Level 2-WM services [] Minimal risk of severe withdrawal    [] If withdrawal is present, manageable at Level 3. 2-WM  [] Minimal risk of severe withdrawal    [] If withdrawal is present manageable at Level 3. 2-WM [] High risk of withdrawal, but manageable at Level  3.7-WM and does not require  full resources of a licensed hospital [] At high risk of withdrawal and requires Level 4-WM and full resources of licensed hospital    COMMENTS:           Dimension 2   Biomedical Conditions and Complications  (BMC/C) [x] None or very stable    [] Receiving concurrent medical monitoring  [] None or not a distraction from treatment    [] Problems are manageable at Level 2.1 [] None or not sufficient to distract from treatment    [] Problems are manageable at  Level 2.5 [] None or stable    [] Receiving concurrent medical monitoring  [] None or stable    [] Receiving concurrent medical monitoring  [] None or stable    [] Receiving concurrent medical monitoring [] Requires 24-hour medical monitoring  but not intensive treatment [] Requires 24-hour medical & nursing care and the full  resources of a licensed hospital   COMMENTS:           Dimension 3  Emotional,  Behavioral,  or Cognitive Conditions and Complications   (EBC/C) [] None or very stable    [x] Receiving concurrent mental health monitoring [] Mild severity  with potential to distract from recovery; needs monitoring [] Mild to moderate severity, with potential to distract from recovery, needs stabilization [] None or minimal; not distracting to recovery    [] If  stable, a    co-occurring capable program is appropriate    [] If not stable, a co-occurring enhanced program is required [] Mild to moderate severity; needs structure to focus on recovery. Treatment should be designed to address significant cognitive deficits     [] If  stable, a  co-occurring capable program is appropriate    [] If not stable, a co-occurring enhanced program is required [] Demonstrates repeated inability to control impulses or unstable & dangerous signs/ symptoms require stabilization. Other functional deficits require stabilization and 24-hr.  setting to prepare for community integration  & continuing care    [] Co-occurring enhanced setting is required for those with severe & chronic mental illness [] Moderate severity;  needs 24-hour   structured setting    [] If client has co-occurring mental disorder, requires concurrent mental health services in a medically monitored setting  [] Severe and unstable problems;  requires 24-hour psychiatric care  with concomitant addiction treatment   COMMENTS:             Staff: Rolando Ovalles  Date:10/25/2022                   [address] Placement      []Admissions  [x]Continued Stay []Discharge/Transfer / Complication in [address] [place] [address] NELB:94/16/6650    Client Sticker      Level of Care Level 1  Outpatient   Services Level 2.1  Intensive  Outpatient  Services Level 2.5  Partial Hospitalization Services Level 3.1  CLINICALLY Managed Low-intensity Residential Services Level 3.3  CLINICALLY Managed Population- Specific High- Intensity Residential Services Level 3.5  CLINICALLY Managed High-Intensive Residential Services Level 3.7  MEDICALLY Monitored Intensive Inpatient Services Level 4  MEDICALLY Managed Intensive Inpatient Services   Dimension 4  Readiness  To  Change [] Ready for recovery but needs motivating and monitoring strategies to strengthen readiness    []Needs ongoing monitoring and disease management    [] High severity in this dimension but not in other dimensions. Needs Level 1 motivational enhancement strategies   [x] Has variable engagement in treatment, ambivalence, or lack of awareness of substance use or mental health problems and requires structured program several times/wk. to promote progress through stages of change [] Has poor engagement in treatment, significant ambivalence, or lack of awareness of substance use or mental health problems, requires near daily structured program or intensive engagement to promote progress through stages of change [] Open to recovery, but needs structured environment to maintain therapeutic gains [] Has little awareness & needs interventions at Level 3.3 to engage & stay in treatment.     [] If there is high severity in this dimension, but not in any other dimension, motivational enhancement strategies should be provided in Level 1 [] Has marked difficulty with, or opposition to treatment with dangerous consequences    [] If there is high severity in this dimension, but not in any other dimension, motivational enhancement strategies should be provided in Level 1 [] Low interest in treatment and impulse control is poor despite negative consequences;  needs motivating strategies only safely available in 24-hour structured setting    [] If there is high severity in this dimension, but not in any other dimension, motivational enhancement strategies should be provided in Level 1 [] Problems in this dimension do not qualify the client for Level 4 services    [] If the client's only severity is in Dimension 4,5, and/or 6 without high severity in Dimensions 1,2, and/or 3, then client is not qualified for Level 4   COMMENTS:           Dimension 5  Relapse, Continued Use or Continued Problem Potential  [] Able to maintain abstinence or control use and/or addictive behaviors  and pursue recovery or motivational goals with minimal support [x] Intensification of addiction or mental health symptoms indicate high likelihood of relapse risk or continued use or continued problems without  close monitoring and support several times/wk.  [] Intensification of addiction or mental health symptoms, despite active participation in a Level 1 or 2.1 program, indicates high likelihood of relapse or continued use or continued problems without near-daily monitoring [] Understands relapse but needs structure to maintain therapeutic gains  [] Has little awareness and needs interventions available only at Level 3.3 to prevent continued use, with imminent dangerous consequences, because of cognitive deficits or  comparable dysfunction  [] Has no recognition  of the skills needed to prevent continued use,  with imminently dangerous  consequences [] Unable to control use, with imminently dangerous consequences,  despite active participation at less intensive levels of care [] Problems in this dimension do not qualify the client for Level 4 services    [] If the client's only severity is in Dimension 4,5, and/or 6 without high severity in Dimensions 1,2, and/or 3, then client is not qualified for Level 4   COMMENTS:           Dimension 6  Recovery/Living Environment []  Recovery environment is supportive and/or the client has skills to cope [x] Recovery environment is not supportive  but with structure and support, the client can cope [] Recovery environment is not supportive, but with structure and support and relief from the home environment, client can cope [] Environment    is dangerous, but recovery is achievable if Level 3.1 24-hour structure is available  [] Environment is dangerous and  client needs 24-hour structure to learn to cope [] Environment is dangerous, and the client lacks skills to cope outside of a  highly structured 24-hour setting   [] Environment is dangerous, and the client lacks skills to cope outside of a  highly structured 24-hour setting [] Problems in this dimension do not qualify the client for Level 4 services    [] If the client's only severity is in Dimension 4,5, and/or 6 without high severity in Dimensions 1,2, and/or 3, then client is not qualified for Level 4   COMMENTS:               Staff: Rosendo Orr Date:10/25/2022

## 2022-10-25 NOTE — GROUP NOTE
Start Time: 65 AM   End Time: 12:00 PM   Number of participants:9  Type of group: Recovery  Mode of intervention: Education, Support, Socialization, Exploration, Clarifying, Problem-solving, Confrontation, Limit-setting, and Reality-testing  Topic: Self- Help Groups  Objective:  Explore how self help groups can aid sobriety and benefit the recovery process for sober lifestyle. Note: Client actively participated in the group session. She was presented with information on self-help groups from daily AA meditation reflection. Client responded to the information and he was able to verbalize and share 2-4 benefits of how attending [place] [address] meetings is benefiting her recovery process. Status after intervention:Improved  Participation level: Active Listener and Interactive  Participation Quality: Appropriate, Attentive, Sharing, and Supportive  Speech: normal  Thought Process/Content:Logical  Mood/Affect: brightens  Self report: None Reported  Response to learning: Able to verbalize current knowledge/experience, Able to verbalize/acknowledge new learning, Able to retain information, Capable of insight, Able to change behavior, and Progressing to goal  Discipline Responsible: /Counselor    [x] Check in completed and reviewed. Intervention required.  No    Electronically signed by Kristyn Lopez [place] 320 on 63/08/4161 at 1:47 PM

## 2022-10-25 NOTE — PLAN OF CARE
Client discussed in treatment team today. Present:   Dr. Sharon Mosley, Penny Wolfe, Mike Antonio and Derrick ATKINSON  STIVEN, SANTA Ruiz,     Current Status: IOP    Treatment goals:    [x] Relapse prevention  [x] Increase sober support  [x] Increase attendance in sober support groups  [x] [place] effectively with cravings and urges  [x] Other: Mental Health     Recommendation: Client recommended to continue with 10 day IOP , follow recommendations of  treatment team court to address her transportation issues and continue attending meetings weekly , and keep all mental health appointments.     Electronically signed by Kristyn Seals [place] 320 on 64/13/5932 at 9:51 AM
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
## 2022-10-26 ENCOUNTER — APPOINTMENT (OUTPATIENT)
Dept: PSYCHIATRY | Age: 22
End: 2022-10-26
Payer: COMMERCIAL

## 2022-10-27 ENCOUNTER — APPOINTMENT (OUTPATIENT)
Dept: PSYCHIATRY | Age: 22
End: 2022-10-27
Payer: COMMERCIAL

## 2022-10-27 ENCOUNTER — HOSPITAL ENCOUNTER (OUTPATIENT)
Dept: PSYCHIATRY | Age: 22
Setting detail: THERAPIES SERIES
Discharge: HOME | End: 2022-10-27
Payer: COMMERCIAL

## 2022-10-27 NOTE — FLOWSHEET NOTE
ARIEL called patient. She noted she did not come in because her son had surgery this morning. She will be here on Monday.     Electronically signed by ROBERT River, ANDREE on 10/27/2022 at 2:54 PM

## 2022-10-31 ENCOUNTER — APPOINTMENT (OUTPATIENT)
Dept: PSYCHIATRY | Age: 22
End: 2022-10-31
Payer: COMMERCIAL

## 2022-10-31 ENCOUNTER — HOSPITAL ENCOUNTER (OUTPATIENT)
Dept: PSYCHIATRY | Age: 22
Setting detail: THERAPIES SERIES
Discharge: HOME | End: 2022-10-31
Payer: COMMERCIAL

## 2022-10-31 PROCEDURE — H0015 ALCOHOL AND/OR DRUG SERVICES: HCPCS

## 2022-10-31 PROCEDURE — 80305 DRUG TEST PRSMV DIR OPT OBS: CPT

## 2022-10-31 NOTE — GROUP NOTE
Start Time: 900 AM  End Time: 56 AM   Number of participants:7  Type of group: Psychotherapy  Mode of intervention: Education, Support, Socialization, Exploration, Clarifying, Problem-solving, Confrontation, Limit-setting, and Reality-testing  Topic: Challenges in Recovery   Objective:  Explore Recovery challenges and process healthy ways to work through challenges to maintain sobriety. Note: Client actively participated in the group session. She was encouraged to discuss her challenges in sobriety. Client shared how she struggles with depression & staying motivated in her recovery. Counselor assisted client with 2-4 ways to maintain structure and asking for help to aid in her recovery process . Status after intervention:Improved  Participation level: Active Listener and Interactive  Participation Quality: Appropriate, Attentive, Sharing, and Supportive  Speech: normal  Thought Process/Content:Logical  Mood/Affect: brightens  Self report: None Reported  Response to learning: Able to verbalize current knowledge/experience, Able to verbalize/acknowledge new learning, Able to retain information, Capable of insight, and Able to change behavior  Discipline Responsible: /Counselor    [x] Check in completed and reviewed. Intervention required.  No    Electronically signed by Jazmine Verma on 62/73/5236 at 1:46 PM

## 2022-10-31 NOTE — GROUP NOTE
Date: 10-31-22  Start Time: 10:30  End Time: 12:00  Number of participants: 7  Type of group: Relapse prevention  Mode of intervention: altruism, universality, instillation of hope, imparting of information, group cohesiveness, and interpersonal learning. Topic: The benefits of AA/NA meetings  Objective: How to get the most out of meetings. Note: Blas Vides was active ingroup noting how she has been attending siber meetings. She did not attend any over the weekend, but the group spoke about attending one together tonight. She has good perspective on attending sober meetings. She has a sponsor and she challenge peers to make meetings and to become infested in their treatment. Status after intervention:Improved  Participation level: Active Listener and Interactive  Participation Quality: Appropriate, Attentive, and Sharing  Speech: normal  Thought Process/Content:Logical  Mood/Affect: calm and congruent  Self report: None Reported  Response to learning: Able to verbalize current knowledge/experience, Able to verbalize/acknowledge new learning, Able to retain information, Capable of insight, Able to change behavior, and Progressing to goal  Discipline Responsible: /Counselor    [x] Check in completed and reviewed. Intervention required.  No    Electronically signed by ROBERT Angel, ANDREE on 10/31/2022 at 3:38 PM

## 2022-11-01 ENCOUNTER — APPOINTMENT (OUTPATIENT)
Dept: PSYCHIATRY | Age: 22
End: 2022-11-01
Payer: COMMERCIAL

## 2022-11-01 ENCOUNTER — HOSPITAL ENCOUNTER (OUTPATIENT)
Dept: PSYCHIATRY | Age: 22
Setting detail: THERAPIES SERIES
Discharge: HOME | End: 2022-11-01
Payer: COMMERCIAL

## 2022-11-01 PROCEDURE — H0015 ALCOHOL AND/OR DRUG SERVICES: HCPCS
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
## 2022-11-01 NOTE — GROUP NOTE
Start Time:1030 AM  End Time: 12:00 PM   Number of participants:7  Type of group: Recovery  Mode of intervention: Education, Support, Socialization, Exploration, Clarifying, Problem-solving, Confrontation, Limit-setting, and Reality-testing  Topic: Boredom  Objective:  Explore triggers related to boredom & relapse and explore coping skills to aid time management to support sobriety. Note: Client actively participated in the group session. She was asked to identify and explore how feeling's of boredom can hinder recovery efforts. Client verbalized current issues of struggling with feelings of boredom and depression due to missing her children. Counselor assisted client with identifying 2-4 ways she can eliminate feelings of boredom  and manage her depression in support of her recovery process. In addition, client reported she is not having a good experience with her mental health counselor and she reported she requested another counselor due to feeling judged. Counselor praised client for taking charge of her needs and going about it the proper way to support her recovery process. Status after intervention:Improved  Participation level: Active Listener and Interactive  Participation Quality: Appropriate, Attentive, Sharing, and Supportive  Speech: normal  Thought Process/Content:Logical  Mood/Affect: calm/ emotional  Self report: None Reported  Response to learning: Able to verbalize current knowledge/experience, Able to verbalize/acknowledge new learning, Able to retain information, Capable of insight, Able to change behavior, and Progressing to goal  Discipline Responsible: /Counselor    [x] Check in completed and reviewed. Intervention required.  No    Electronically signed by Kristyn Seals 320 on 71/4/4261 at 2:36 PM

## 2022-11-02 ENCOUNTER — APPOINTMENT (OUTPATIENT)
Dept: PSYCHIATRY | Age: 22
End: 2022-11-02
Payer: COMMERCIAL

## 2022-11-02 NOTE — PROGRESS NOTES
Met with treatment team and discussed patient care. Patient meets level of care as evidenced by ASam evaluation. Patient continues to require IOP treatment. Adjust treatment as needed.

## 2022-11-03 ENCOUNTER — HOSPITAL ENCOUNTER (OUTPATIENT)
Dept: PSYCHIATRY | Age: 22
Setting detail: THERAPIES SERIES
Discharge: HOME | End: 2022-11-03
Payer: COMMERCIAL

## 2022-11-03 PROCEDURE — H0015 ALCOHOL AND/OR DRUG SERVICES: HCPCS

## 2022-11-03 RX ORDER — ESCITALOPRAM OXALATE 20 MG/1
20 TABLET ORAL DAILY
Qty: 30 TABLET | Refills: 0 | Status: SHIPPED | OUTPATIENT
Start: 2022-11-03 | End: 2022-12-03

## 2022-11-03 NOTE — GROUP NOTE
Start Time: 65 AM   End Time: 12:00 PM   Number of participants:4  Type of group: Relapse Prevention  Mode of intervention: Education, Support, Socialization, Exploration, Clarifying, Problem-solving, Media, Confrontation, Limit-setting, and Reality-testing  Topic: Relapse Prevention   Objective:  Explore coping skills and gain awareness to enhance decision making to aid sobriety. Note: Client actively participated in the group session. She was assigned to view documentary\" Homer Osborne Story\". Film dealt with multiple attempts at sobriety without utlizing sober support & working program of recovery. Client related to the education and shared her own experiences of trying to stay sober on her own without help and she verbalized the 2-4 negative impacts of not reaching out for help. In addition, client shared how her life is changing for the better now that she is more active in recovery with developing new sober relationships and using her professional resources. Status after intervention:Improved  Participation level: Active Listener and Interactive  Participation Quality: Appropriate, Attentive, Sharing, and Supportive  Speech: normal  Thought Process/Content:Logical  Mood/Affect: brightens  Self report: None Reported  Response to learning: Able to verbalize current knowledge/experience, Able to verbalize/acknowledge new learning, Able to retain information, Capable of insight, Able to change behavior, and Progressing to goal  Discipline Responsible: /Counselor    [x] Check in completed and reviewed. Intervention required.  No    Electronically signed by Kristyn Seals [place] 320 on 95/7/5568 at 2:33 PM

## 2022-11-03 NOTE — GROUP NOTE
Date: 11-3-22  Start Time: 9:00  End Time: 10:15  Number of participants: 4  Type of Group: psychotherapy  Mode of intervention: existential factors, socialization, imparting of information, interpersonal learning, universality and correction of the primary family. Topic :Exploring triggers and history of use  Objective: To use critical thinking for emotional management. Note: Teresa Michel had a good session. She noted her father's anniversary of his death is next week. The group processed this  in order to help her avoid a relapse. She likewise noted some of her family members on her fathers side have sunned her. She also helped to support others who have similar situations as her. She was at his bed side when he  of complications related to an overdose. She noted she has a home group and a sponsor, but she is seeking a NA sponsor as well. She continues to make her meetings. She is not in favor to seek employment at this time, but would rather focus on her sobriety. Status after intervention:Improved  Participation level: Active Listener and Interactive  Participation Quality: Appropriate, Attentive, and Sharing  Speech: normal  Thought Process/Content:Logical  Mood/Affect: calm and congruent  Self report: None Reported  Response to learning: Able to verbalize current knowledge/experience, Able to verbalize/acknowledge new learning, Able to retain information, Capable of insight, Able to change behavior, and Progressing to goal  Discipline Responsible: /Counselor    [x] Check in completed and reviewed. Intervention required.  No  Electronically signed by ROBERT Owens, ANDREE on 11/3/2022 at 2:16 PM

## 2022-11-07 ENCOUNTER — HOSPITAL ENCOUNTER (OUTPATIENT)
Dept: PSYCHIATRY | Age: 22
Setting detail: THERAPIES SERIES
Discharge: HOME | End: 2022-11-07
Payer: COMMERCIAL

## 2022-11-07 PROCEDURE — 80305 DRUG TEST PRSMV DIR OPT OBS: CPT

## 2022-11-07 PROCEDURE — H0015 ALCOHOL AND/OR DRUG SERVICES: HCPCS

## 2022-11-07 NOTE — PLAN OF CARE
Client discussed in treatment team today. Present:  wKabena Dennis [place] , Lorenesilas PENG, Lalo Xiao, and [facility]      Current Status: Currently in IOP    Treatment goals:    [x] Relapse prevention  [x] Increase sober support  [x] Increase attendance in sober support groups  [x] Lomax effectively with cravings and urges  [] Other:     Recommendation: Tigre Lucianobakeily Clifforderrol is scheduled to graduate IOP this Thursday and transition to After- Care. She has been making meetings and has a sponsor.     Electronically signed by ROBERT Tinajero, LSW on 11/7/2022 at 4:00 PM

## 2022-11-07 NOTE — PLAN OF CARE
[facility]- Level of Care Placement      []Admissions  [x]Continued Stay []Discharge/Transfer / Complication in [address] [id number]          Level of Care Level 1      Outpatient Services Level 2.1   Intensive Outpatient Services(IOP) Level 2.5   Partial Hospitalization Services Level 3.1 CLINICALLY Managed Low-Intensity Residential Services Level 3.3  CLINICALLY Managed Population- Specific High- Intensity Residential Services Level 3.5  CLINICALLY Managed High Intensive Residential Services Level 3.7  MEDICALLY Monitored Intensive Inpatient Services Level 4  MEDICALLY Managed Intensive Inpatient Services   Dimension 1  Acute Intoxication and/or Withdrawal Potential [x] Not experiencing significant withdrawal    [] Minimal  risk of severe  withdrawal [] Minimal  risk of severe  withdrawal    [] Manageable  at Level 2-WM [] Moderate  risk of severe withdrawal    [] Manageable at Level 2-WM [] No withdrawal risk or minimal or stable withdrawal     [] Concurrently receiving Level -WM or Level 2-WM services [] Minimal risk of severe withdrawal    [] If withdrawal is present, manageable at Level 3. 2-WM  [] Minimal risk of severe withdrawal    [] If withdrawal is present manageable at Level 3. 2-WM [] High risk of withdrawal, but manageable at Level  3.7-WM and does not require  full resources of a licensed hospital [] At high risk of withdrawal and requires Level 4-WM and full resources of licensed hospital    COMMENTS:           Dimension 2   Biomedical Conditions and Complications  (BMC/C) [x] None or very stable    [] Receiving concurrent medical monitoring  [] None or not a distraction from treatment    [] Problems are manageable at Level 2.1 [] None or not sufficient to distract from treatment    [] Problems are manageable at  Level 2.5 [] None or stable    [] Receiving concurrent medical monitoring  [] None or stable    [] Receiving concurrent medical monitoring  [] None or stable    [] Receiving concurrent medical monitoring [] Requires 24-hour medical monitoring  but not intensive treatment [] Requires 24-hour medical & nursing care and the full  resources of a licensed hospital   COMMENTS:           Dimension 3  Emotional,  Behavioral,  or Cognitive Conditions and Complications   (EBC/C) [] None or very stable    [x] Receiving concurrent mental health monitoring [] Mild severity  with potential to distract from recovery; needs monitoring [] Mild to moderate severity, with potential to distract from recovery, needs stabilization [] None or minimal; not distracting to recovery    [] If  stable, a    co-occurring capable program is appropriate    [] If not stable, a co-occurring enhanced program is required [] Mild to moderate severity; needs structure to focus on recovery. Treatment should be designed to address significant cognitive deficits     [] If  stable, a  co-occurring capable program is appropriate    [] If not stable, a co-occurring enhanced program is required [] Demonstrates repeated inability to control impulses or unstable & dangerous signs/ symptoms require stabilization. Other functional deficits require stabilization and 24-hr.  setting to prepare for community integration  & continuing care    [] Co-occurring enhanced setting is required for those with severe & chronic mental illness [] Moderate severity;  needs 24-hour   structured setting    [] If client has co-occurring mental disorder, requires concurrent mental health services in a medically monitored setting  [] Severe and unstable problems;  requires 24-hour psychiatric care  with concomitant addiction treatment   COMMENTS:             Electronically signed by Kristyn Aldrich [place] 320 on 05/2/0646 at 5:09 PM                    [address] Placement      []Admissions  [x]Continued Stay []Discharge/Transfer / Complication in [facility] [id number]          Level of Care Level 1  Outpatient   Services Level 2.1  Intensive  Outpatient  Services Level 2.5  Partial Hospitalization Services Level 3.1  CLINICALLY Managed Low-intensity Residential Services Level 3.3  CLINICALLY Managed Population- Specific High- Intensity Residential Services Level 3.5  CLINICALLY Managed High-Intensive Residential Services Level 3.7  MEDICALLY Monitored Intensive Inpatient Services Level 4  MEDICALLY Managed Intensive Inpatient Services   Dimension 4  Readiness  To  Change [x] Ready for recovery but needs motivating and monitoring strategies to strengthen readiness    []Needs ongoing monitoring and disease management    [] High severity in this dimension but not in other dimensions. Needs Level 1 motivational enhancement strategies   [] Has variable engagement in treatment, ambivalence, or lack of awareness of substance use or mental health problems and requires structured program several times/wk. to promote progress through stages of change [] Has poor engagement in treatment, significant ambivalence, or lack of awareness of substance use or mental health problems, requires near daily structured program or intensive engagement to promote progress through stages of change [] Open to recovery, but needs structured environment to maintain therapeutic gains [] Has little awareness & needs interventions at Level 3.3 to engage & stay in treatment.     [] If there is high severity in this dimension, but not in any other dimension, motivational enhancement strategies should be provided in Level 1 [] Has marked difficulty with, or opposition to treatment with dangerous consequences    [] If there is high severity in this dimension, but not in any other dimension, motivational enhancement strategies should be provided in Level 1 [] Low interest in treatment and impulse control is poor despite negative consequences;  needs motivating strategies only safely available in 24-hour structured setting    [] If there is high severity in this dimension, but not in any other dimension, motivational enhancement strategies should be provided in Level 1 [] Problems in this dimension do not qualify the client for Level 4 services    [] If the client's only severity is in Dimension 4,5, and/or 6 without high severity in Dimensions 1,2, and/or 3, then client is not qualified for Level 4   COMMENTS:           Dimension 5  Relapse, Continued Use or Continued Problem Potential  [] Able to maintain abstinence or control use and/or addictive behaviors  and pursue recovery or motivational goals with minimal support [x] Intensification of addiction or mental health symptoms indicate high likelihood of relapse risk or continued use or continued problems without  close monitoring and support several times/wk.  [] Intensification of addiction or mental health symptoms, despite active participation in a Level 1 or 2.1 program, indicates high likelihood of relapse or continued use or continued problems without near-daily monitoring [] Understands relapse but needs structure to maintain therapeutic gains  [] Has little awareness and needs interventions available only at Level 3.3 to prevent continued use, with imminent dangerous consequences, because of cognitive deficits or  comparable dysfunction  [] Has no recognition  of the skills needed to prevent continued use,  with imminently dangerous  consequences [] Unable to control use, with imminently dangerous consequences,  despite active participation at less intensive levels of care [] Problems in this dimension do not qualify the client for Level 4 services    [] If the client's only severity is in Dimension 4,5, and/or 6 without high severity in Dimensions 1,2, and/or 3, then client is not qualified for Level 4   COMMENTS:           Dimension 6  Recovery/Living Environment [x]  Recovery environment is supportive and/or the client has skills to cope [] Recovery environment is not supportive  but with structure and support, the client can cope [] Recovery environment is not supportive, but with structure and support and relief from the home environment, client can cope [] Environment    is dangerous, but recovery is achievable if Level 3.1 24-hour structure is available  [] Environment is dangerous and  client needs 24-hour structure to learn to cope [] Environment is dangerous, and the client lacks skills to cope outside of a  highly structured 24-hour setting   [] Environment is dangerous, and the client lacks skills to cope outside of a  highly structured 24-hour setting [] Problems in this dimension do not qualify the client for Level 4 services    [] If the client's only severity is in Dimension 4,5, and/or 6 without high severity in Dimensions 1,2, and/or 3, then client is not qualified for Level 4   COMMENTS:               Electronically signed by Kristyn Reyes [place] 320 on 05/9/0541 at 5:10 PM

## 2022-11-07 NOTE — GROUP NOTE
Date: 11-7-22  Start Time: 9:00  End Time: 10:15  Number of participants: 8  Type of group:Psychotherapy  Mode of interventions: Instillation of hope, correction of the primary family, interpersonal learning, existential factors, imparting of information, and universality:   Topic: reviewing our sobriety  Objective: utilizing group support    Note: Leena Barkley became tearful briefly in class because her father's upcoming anniversary of his death this month. This is a trigger for her. She is involved in mental health therapy at [facility]. The group supported her grieving of her father. She has been attending meetings and has a sponsor, but would like to have a N/A sponsor. She continues to work with [facility] to meet expectations to have her children returned to her. Status after intervention:Improved  Participation level: Active Listener and Interactive  Participation Quality: Appropriate, Attentive, and Sharing  Speech: normal  Thought Process/Content:Logical  Mood/Affect: calm, congruent, and tearful  Self report: None Reported  Response to learning: Able to verbalize current knowledge/experience, Able to verbalize/acknowledge new learning, Able to retain information, Capable of insight, Able to change behavior, and Progressing to goal  Discipline Responsible: /Counselor    [x] Check in completed and reviewed. Intervention required.  No    Electronically signed by ROBERT Nava, ANDREE on 11/7/2022 at 2:00 PM

## 2022-11-07 NOTE — GROUP NOTE
Start Time : [id number] AM  End Time: 12:00 PM  Number of participants:8  Type of group: Psychoeducation  Mode of intervention: Education, Support, Socialization, Exploration, Clarifying, Problem-solving, Confrontation, Limit-setting, and Reality-testing  Topic: Cross Addiction  Objective: To explore and gain awareness on cross addition and how it impacts sobriety. Note: Client actively participated in the group session. She was assigned to view education \" Cross Addiction Back Door to Relapse\" by Viaziz Scamve. Client was able to verbalize 1-2 insights gained form education related to education and shared her own personal experience of using alcohol to substitute for crack cocaine in early treatment. Client acknowledged how this behavior was not supportive of her sobriety and verbalized being grateful for her sober lifestyle change. Status after intervention:Improved  Participation level: Active Listener and Interactive  Participation Quality: Appropriate, Attentive, and Sharing  Speech: normal  Thought Process/Content:Logical  Mood/Affect: anxious  Self report: None Reported  Response to learning: Able to verbalize current knowledge/experience, Able to verbalize/acknowledge new learning, Able to retain information, and Capable of insight  Discipline Responsible: /Counselor    [x] Check in completed and reviewed. Intervention required.  No    Electronically signed by Kristyn Aldrich [place] 320 on 32/5/7991 at 3:22 PM

## 2022-11-08 ENCOUNTER — HOSPITAL ENCOUNTER (OUTPATIENT)
Dept: PSYCHIATRY | Age: 22
Setting detail: THERAPIES SERIES
Discharge: HOME | End: 2022-11-08
Payer: COMMERCIAL

## 2022-11-08 PROCEDURE — H0015 ALCOHOL AND/OR DRUG SERVICES: HCPCS

## 2022-11-08 PROCEDURE — 99214 OFFICE O/P EST MOD 30 MIN: CPT

## 2022-11-08 RX ORDER — PRAZOSIN HYDROCHLORIDE 1 MG/1
1 CAPSULE ORAL NIGHTLY
Qty: 30 CAPSULE | Refills: 0 | Status: SHIPPED | OUTPATIENT
Start: 2022-11-08 | End: 2022-12-08

## 2022-11-08 RX ORDER — HYDROXYZINE PAMOATE 25 MG/1
25 CAPSULE ORAL 2 TIMES DAILY PRN
Qty: 60 CAPSULE | Refills: 0 | Status: SHIPPED | OUTPATIENT
Start: 2022-11-08 | End: 2022-12-08

## 2022-11-08 NOTE — PROGRESS NOTES
PSYCHIATRY ATTENDING NOTE:    CC: \"Doing great. \"    S: Patient being seen at [facility] in follow-up for polysubstance abuse, depression and anxiety. Met with patient to discuss progress with treatment. Also discussed patient with treatment team. No new medications were added last appointment. Patient was smiling on assessment, hair and makeup done and Renetta Alatorre had a happy affect. She reports that she is feeling so much better, has more motivation and getting out of the house more to do things. She states that she is not having as much social anxiety and \"not as scared of people. \" She reports that it is some to the one year anniversary of her fathers death but she feels she is ready for it. She will graduate to aftercare on Thursday. We spoke of following up with [facility] for her counseling and medication management. She is not suicidal, homicidal, manic or psychotic. She is sleeping and eating well. No acute issues or concerns. MSE:  Maribel Larkin female appears age. Pleasant, cooperative, forthcoming. Normal psychomotor activity, gait, strength, tone, eye contact. Mood euthymic. Affect congruent. Speech clear. Thoughts organized. Content future-oriented. No suicidal or homicidal ideations. No paranoia, delusions or hallucinations. Orientation, concentration, recent and remote memory are grossly intact. Fund of knowledge fair. Language use fair. Insight and judgment fair. MEDICATIONS:    Lexapro 20 mg nightly (cont)                                      Minipress 1 mg nightly (cont)                                      Vistaril 25 mg bid prn (cont)    ASSESSMENT:   Major Depression Disorder, recurrent moderate                                      Borderline Personality Disorder                                      Polysubstance Abuse        PLAN: Continue IOP and current medications. Patient responding favorably to treatment with Lexapro and Vistaril. No new medications added.   Continue to monitor symptoms, side effects and response to medications. See back in 2 weeks.   Discussed with treatment team.                          Electronically signed by JAYCOB Zuniga CNP on 11/8/2022 at 9:10 AM

## 2022-11-08 NOTE — GROUP NOTE
Start Time: 65 AM  End Time : 12:00 PM   Number of participants:7  Type of group: Relapse Prevention  Mode of intervention: Education, Support, Socialization, Exploration, Clarifying, Problem-solving, Confrontation, Limit-setting, and Reality-testing  Topic: High risk situations  Objective:  Explore and process high risk situations that pose harm to sobriety and identify coping skills to aid relapse prevention. Note: Client actively participated in the group session. She was assigned to identify and explore 1-2 high risk situations that pose harm to his sobriety. Client identified getting into emotional entanglements as high risk situations but she still reports engaging in the behaviors with male neighbor. Counselor attempted to assist client with exploring the risks to her recovery and how this behavior reinforces her trauma , however client was resistant with being dismissive and augmentative regarding her behaviors and was resistant to feedback. Status after intervention:Decompensated  Participation level: Active Listener and Interactive  Participation Quality: Resistant  Speech: normal  Thought Process/Content:Logical  Mood/Affect: anxious and irritable  Self report: None Reported  Response to learning: Resistant  Discipline Responsible: /Counselor    [x] Check in completed and reviewed. Intervention required.  No  Electronically signed by Precilla Babinski, [address] on 00/6/2763 at 3:33 PM

## 2022-11-09 ENCOUNTER — APPOINTMENT (OUTPATIENT)
Dept: PSYCHIATRY | Age: 22
End: 2022-11-09
Payer: COMMERCIAL

## 2022-11-10 ENCOUNTER — HOSPITAL ENCOUNTER (OUTPATIENT)
Dept: PSYCHIATRY | Age: 22
Setting detail: THERAPIES SERIES
Discharge: HOME | End: 2022-11-10
Payer: COMMERCIAL

## 2022-11-10 PROCEDURE — 80305 DRUG TEST PRSMV DIR OPT OBS: CPT

## 2022-11-10 PROCEDURE — H0015 ALCOHOL AND/OR DRUG SERVICES: HCPCS

## 2022-11-10 NOTE — GROUP NOTE
Date: 11-10-22  Start Time: 10:30  End Time: 12:00  Type of group: Relapse prevention  Number of participants: 8  Mode of intervention: Instillation of hope, universality, imparting of information, altruism, socialization, and interpersonal learning. Topic: Coping skills and meditation   Objective: To use these skills to maintain sobriety    Note: [facility] participated in a relaxation group which transitioned into a to topic of what do we do to remain calm in stressful situations. She noted spending time with her children, engaging with others in meaningful conversation. She has been attending meetings and is seeking a sponor under NA to better guide her in her sobriety. Status after intervention:Improved  Participation level: Active Listener and Interactive  Participation Quality: Appropriate, Attentive, and Sharing  Speech: normal  Thought Process/Content:Logical  Mood/Affect: calm and congruent  Self report: None Reported  Response to learning: Able to verbalize current knowledge/experience, Able to verbalize/acknowledge new learning, Able to retain information, Capable of insight, Able to change behavior, and Progressing to goal  Discipline Responsible: /Counselor    [x] Check in completed and reviewed. Intervention required.  No    Electronically signed by ROBERT Thomas, OFEW on 11/10/2022 at 2:29 PM

## 2022-11-10 NOTE — GROUP NOTE
Start Time: 900 AM   End Time : 56 AM   Number of participants:9  Type of group: Psychotherapy  Mode of intervention: Education, Support, Socialization, Exploration, Clarifying, Problem-solving, Confrontation, Limit-setting, and Reality-testing  Topic: Coping skills   Objective:  Explore coping skills that are aiding the recovery process and develop an awareness on what's working maintain sobriety. Note: Client actively participated in the group session. She was asked to identify and process coping skills that are working to aid her sobriety. Client shared ow going to meetings , staying active weekly with AA/ NA support is helpful by keeping her accountable and providing a safe space for her to share her thoughts and feelings when having a thought of substance use as coping skills to aid her recovery process. Status after intervention:Improved  Participation level: Active Listener and Interactive  Participation Quality: Appropriate, Attentive, Sharing, and Supportive  Speech: normal  Thought Process/Content:Logical  Mood/Affect: brightens  Self report: None Reported  Response to learning: Able to verbalize current knowledge/experience, Able to verbalize/acknowledge new learning, Able to retain information, Capable of insight, Able to change behavior, and Progressing to goal  Discipline Responsible: /Counselor    [x] Check in completed and reviewed. Intervention required.  No    Electronically signed by Kristyn Alex [place] 320 on 09/67/6485 at 1:44 PM

## 2022-11-14 ENCOUNTER — HOSPITAL ENCOUNTER (OUTPATIENT)
Dept: PSYCHIATRY | Age: 22
Setting detail: THERAPIES SERIES
Discharge: HOME | End: 2022-11-14
Payer: COMMERCIAL

## 2022-11-14 NOTE — PLAN OF CARE
[facility]- Level of Care Placement      []Admissions  []Continued Stay [x]Transfer  Date:11/14/2022    Client Sticker      Level of Care Level 1      Outpatient Services Level 2.1   Intensive Outpatient Services(IOP) Level 2.5   Partial Hospitalization Services Level 3.1 CLINICALLY Managed Low-Intensity Residential Services Level 3.3  CLINICALLY Managed Population- Specific High- Intensity Residential Services Level 3.5  CLINICALLY Managed High Intensive Residential Services Level 3.7  MEDICALLY Monitored Intensive Inpatient Services Level 4  MEDICALLY Managed Intensive Inpatient Services   Dimension 1  Acute Intoxication and/or Withdrawal Potential [x] Not experiencing significant withdrawal    [] Minimal  risk of severe  withdrawal [] Minimal  risk of severe  withdrawal    [] Manageable  at Level 2-WM [] Moderate  risk of severe withdrawal    [] Manageable at Level 2-WM [] No withdrawal risk or minimal or stable withdrawal     [] Concurrently receiving Level -WM or Level 2-WM services [] Minimal risk of severe withdrawal    [] If withdrawal is present, manageable at Level 3. 2-WM  [] Minimal risk of severe withdrawal    [] If withdrawal is present manageable at Level 3. 2-WM [] High risk of withdrawal, but manageable at Level  3.7-WM and does not require  full resources of a licensed hospital [] At high risk of withdrawal and requires Level 4- and full resources of licensed hospital    COMMENTS:           Dimension 2   Biomedical Conditions and Complications  (BMC/C) [x] None or very stable    [] Receiving concurrent medical monitoring  [] None or not a distraction from treatment    [] Problems are manageable at Level 2.1 [] None or not sufficient to distract from treatment    [] Problems are manageable at  Level 2.5 [] None or stable    [] Receiving concurrent medical monitoring  [] None or stable    [] Receiving concurrent medical monitoring  [] None or stable    [] Receiving concurrent medical monitoring [] Requires 24-hour medical monitoring  but not intensive treatment [] Requires 24-hour medical & nursing care and the full  resources of a licensed hospital   COMMENTS:           Dimension 3  Emotional,  Behavioral,  or Cognitive Conditions and Complications   (EBC/C) [] None or very stable    [x] Receiving concurrent mental health monitoring [] Mild severity  with potential to distract from recovery; needs monitoring [] Mild to moderate severity, with potential to distract from recovery, needs stabilization [] None or minimal; not distracting to recovery    [] If  stable, a    co-occurring capable program is appropriate    [] If not stable, a co-occurring enhanced program is required [] Mild to moderate severity; needs structure to focus on recovery. Treatment should be designed to address significant cognitive deficits     [] If  stable, a  co-occurring capable program is appropriate    [] If not stable, a co-occurring enhanced program is required [] Demonstrates repeated inability to control impulses or unstable & dangerous signs/ symptoms require stabilization. Other functional deficits require stabilization and 24-hr.  setting to prepare for community integration  & continuing care    [] Co-occurring enhanced setting is required for those with severe & chronic mental illness [] Moderate severity;  needs 24-hour   structured setting    [] If client has co-occurring mental disorder, requires concurrent mental health services in a medically monitored setting  [] Severe and unstable problems;  requires 24-hour psychiatric care  with concomitant addiction treatment   COMMENTS:             Staff: Rosendo Orr  Date:11/14/2022                   [address] Placement      []Admissions  []Continued Stay [x]Transfer  Date:11/14/2022    Client Sticker      Level of Care Level 1  Outpatient   Services Level 2.1  Intensive  Outpatient  Services Level 2.5  Partial Hospitalization Services Level 3.1  CLINICALLY Managed Low-intensity Residential Services Level 3.3  CLINICALLY Managed Population- Specific High- Intensity Residential Services Level 3.5  CLINICALLY Managed High-Intensive Residential Services Level 3.7  MEDICALLY Monitored Intensive Inpatient Services Level 4  MEDICALLY Managed Intensive Inpatient Services   Dimension 4  Readiness  To  Change [] Ready for recovery but needs motivating and monitoring strategies to strengthen readiness    [x]Needs ongoing monitoring and disease management    [] High severity in this dimension but not in other dimensions. Needs Level 1 motivational enhancement strategies   [] Has variable engagement in treatment, ambivalence, or lack of awareness of substance use or mental health problems and requires structured program several times/wk. to promote progress through stages of change [] Has poor engagement in treatment, significant ambivalence, or lack of awareness of substance use or mental health problems, requires near daily structured program or intensive engagement to promote progress through stages of change [] Open to recovery, but needs structured environment to maintain therapeutic gains [] Has little awareness & needs interventions at Level 3.3 to engage & stay in treatment.     [] If there is high severity in this dimension, but not in any other dimension, motivational enhancement strategies should be provided in Level 1 [] Has marked difficulty with, or opposition to treatment with dangerous consequences    [] If there is high severity in this dimension, but not in any other dimension, motivational enhancement strategies should be provided in Level 1 [] Low interest in treatment and impulse control is poor despite negative consequences;  needs motivating strategies only safely available in 24-hour structured setting    [] If there is high severity in this dimension, but not in any other dimension, motivational enhancement strategies should be provided in Level 1 [] Problems in this dimension do not qualify the client for Level 4 services    [] If the client's only severity is in Dimension 4,5, and/or 6 without high severity in Dimensions 1,2, and/or 3, then client is not qualified for Level 4   COMMENTS:           Dimension 5  Relapse, Continued Use or Continued Problem Potential  [x] Able to maintain abstinence or control use and/or addictive behaviors  and pursue recovery or motivational goals with minimal support [] Intensification of addiction or mental health symptoms indicate high likelihood of relapse risk or continued use or continued problems without  close monitoring and support several times/wk.  [] Intensification of addiction or mental health symptoms, despite active participation in a Level 1 or 2.1 program, indicates high likelihood of relapse or continued use or continued problems without near-daily monitoring [] Understands relapse but needs structure to maintain therapeutic gains  [] Has little awareness and needs interventions available only at Level 3.3 to prevent continued use, with imminent dangerous consequences, because of cognitive deficits or  comparable dysfunction  [] Has no recognition  of the skills needed to prevent continued use,  with imminently dangerous  consequences [] Unable to control use, with imminently dangerous consequences,  despite active participation at less intensive levels of care [] Problems in this dimension do not qualify the client for Level 4 services    [] If the client's only severity is in Dimension 4,5, and/or 6 without high severity in Dimensions 1,2, and/or 3, then client is not qualified for Level 4   COMMENTS:           Dimension 6  Recovery/Living Environment [x]  Recovery environment is supportive and/or the client has skills to cope [] Recovery environment is not supportive  but with structure and support, the client can cope [] Recovery environment is not supportive, but with structure and support and relief from the home environment, client can cope [] Environment    is dangerous, but recovery is achievable if Level 3.1 24-hour structure is available  [] Environment is dangerous and  client needs 24-hour structure to learn to cope [] Environment is dangerous, and the client lacks skills to cope outside of a  highly structured 24-hour setting   [] Environment is dangerous, and the client lacks skills to cope outside of a  highly structured 24-hour setting [] Problems in this dimension do not qualify the client for Level 4 services    [] If the client's only severity is in Dimension 4,5, and/or 6 without high severity in Dimensions 1,2, and/or 3, then client is not qualified for Level 4   COMMENTS:               Staff: Suma Berry Date:11/14/2022

## 2022-11-15 ENCOUNTER — APPOINTMENT (OUTPATIENT)
Dept: PSYCHIATRY | Age: 22
End: 2022-11-15
Payer: COMMERCIAL

## 2022-11-16 ENCOUNTER — APPOINTMENT (OUTPATIENT)
Dept: PSYCHIATRY | Age: 22
End: 2022-11-16
Payer: COMMERCIAL

## 2022-11-16 ENCOUNTER — HOSPITAL ENCOUNTER (OUTPATIENT)
Dept: PSYCHIATRY | Age: 22
Setting detail: THERAPIES SERIES
Discharge: HOME | End: 2022-11-16
Payer: COMMERCIAL

## 2022-11-16 PROCEDURE — 90832 PSYTX W PT 30 MINUTES: CPT

## 2022-11-17 ENCOUNTER — APPOINTMENT (OUTPATIENT)
Dept: PSYCHIATRY | Age: 22
End: 2022-11-17
Payer: COMMERCIAL

## 2022-11-17 NOTE — PROGRESS NOTES
Individual Therapy note        Date: 11/16/2022  Start time: 4:47  End time: 5:30     Patient's Goal: Maintain Sobriety    Notes: Client actively participated in the session. She reports continued sobriety and attended 2 meetings for the week. Client was told she will have to make up missed meeting. Today's session focused on reviewing new treatment goals and discussing expectations and rules of aftercare. Client reviewed treatment plan and agreed to the plan of care. Client reported having a good week and excitement regarding neisha new employment and how her confidence has improved with sobriety and gaining employment. No drug screen obtained on this session due to client reports she was screened for court today. Status After Intervention:  Improved    Participation Level: Active Listener and Interactive    Participation Quality: Appropriate, Attentive, and Sharing    Speech:  Normal rate and tone    Thought Process/Content: Logical    Level of consciousness:  Alert, Oriented x4, and Attentive    Response to Learning: Able to verbalize current knowledge/experience, Able to verbalize/acknowledge new learning, Able to retain information, Capable of insight, and Able to change behavior    Endings: None Reported    Discipline Responsible: /Counselor    [x] Check in completed and reviewed. Intervention required.  No    Electronically signed by Kristyn Parisi [place] [address] on 27/85/2658 at 3:14 PM

## 2022-11-21 ENCOUNTER — APPOINTMENT (OUTPATIENT)
Dept: PSYCHIATRY | Age: 22
End: 2022-11-21
Payer: COMMERCIAL

## 2022-11-21 NOTE — PLAN OF CARE
Client discussed in treatment team today. Present:   RUBEN Howe, CHRISTIANO Owens, Manager Lorene Jonna PRATT     Current Status: [facility]     Treatment goals:    [x] Relapse prevention  [x] Increase sober support  [x] Increase attendance in sober support groups  [x] [place] effectively with cravings and urges  [] Other:      Recommendation: Client making good progress as evident by negative screens and consistent effort. She will need to make missed meetings.     Electronically signed by Kristyn Howe [place] [address] on 09/86/2593 at 2:06 PM

## 2022-11-22 ENCOUNTER — APPOINTMENT (OUTPATIENT)
Dept: PSYCHIATRY | Age: 22
End: 2022-11-22
Payer: COMMERCIAL

## 2022-11-23 ENCOUNTER — APPOINTMENT (OUTPATIENT)
Dept: PSYCHIATRY | Age: 22
End: 2022-11-23
Payer: COMMERCIAL

## 2022-11-23 ENCOUNTER — HOSPITAL ENCOUNTER (OUTPATIENT)
Dept: PSYCHIATRY | Age: 22
Setting detail: THERAPIES SERIES
Discharge: HOME | End: 2022-11-23
Payer: COMMERCIAL

## 2022-11-23 PROCEDURE — 80305 DRUG TEST PRSMV DIR OPT OBS: CPT

## 2022-11-23 PROCEDURE — 90832 PSYTX W PT 30 MINUTES: CPT

## 2022-11-24 ENCOUNTER — APPOINTMENT (OUTPATIENT)
Dept: PSYCHIATRY | Age: 22
End: 2022-11-24
Payer: COMMERCIAL

## 2022-11-28 ENCOUNTER — APPOINTMENT (OUTPATIENT)
Dept: PSYCHIATRY | Age: 22
End: 2022-11-28
Payer: COMMERCIAL
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
## 2022-11-29 ENCOUNTER — APPOINTMENT (OUTPATIENT)
Dept: PSYCHIATRY | Age: 22
End: 2022-11-29
Payer: COMMERCIAL

## 2022-11-30 ENCOUNTER — HOSPITAL ENCOUNTER (OUTPATIENT)
Dept: PSYCHIATRY | Age: 22
Setting detail: THERAPIES SERIES
Discharge: HOME | End: 2022-11-30
Payer: COMMERCIAL

## 2022-11-30 ENCOUNTER — APPOINTMENT (OUTPATIENT)
Dept: PSYCHIATRY | Age: 22
End: 2022-11-30
Payer: COMMERCIAL

## 2022-11-30 PROCEDURE — 90832 PSYTX W PT 30 MINUTES: CPT
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
## 2022-12-01 ENCOUNTER — APPOINTMENT (OUTPATIENT)
Dept: PSYCHIATRY | Age: 22
End: 2022-12-01
Payer: COMMERCIAL

## 2022-12-05 ENCOUNTER — APPOINTMENT (OUTPATIENT)
Dept: PSYCHIATRY | Age: 22
End: 2022-12-05
Payer: COMMERCIAL

## 2022-12-05 NOTE — PLAN OF CARE
Client discussed in treatment team today. Present:  Dr. Faith Patino, Carrie Rodriguez MSN STIVEN, Kiya PENG, Raúl Simons [place] [address], and Kenny ALVARADO      Current Status: In After-Care    Treatment goals:    [] Relapse prevention  [x] Increase sober support  [] Increase attendance in sober support groups  [x] [place] effectively with cravings and urges  [x] Other:     Recommendation: To continue in after-care. Suni Ruiz recently secured a part time job which has improved her self-esteem. She has  a sponsor and has been maintaining sobriety.     Electronically signed by ROBERT Wong, ANDREE on 12/5/2022 at 4:05 PM

## 2022-12-05 NOTE — PLAN OF CARE
[facility]\""- Level of Care Placement      []Admissions  [x]Continued Stay []Discharge/Transfer / Complication in [address] [place] [address] [facility]          Level of Care Level 1      Outpatient Services Level 2.1   Intensive Outpatient Services(IOP) Level 2.5   Partial Hospitalization Services Level 3.1 CLINICALLY Managed Low-Intensity Residential Services Level 3.3  CLINICALLY Managed Population- Specific High- Intensity Residential Services Level 3.5  CLINICALLY Managed High Intensive Residential Services Level 3.7  MEDICALLY Monitored Intensive Inpatient Services Level 4  MEDICALLY Managed Intensive Inpatient Services   Dimension 1  Acute Intoxication and/or Withdrawal Potential [x] Not experiencing significant withdrawal    [] Minimal  risk of severe  withdrawal [] Minimal  risk of severe  withdrawal    [] Manageable  at Level 2-WM [] Moderate  risk of severe withdrawal    [] Manageable at Level 2-WM [] No withdrawal risk or minimal or stable withdrawal     [] Concurrently receiving Level -WM or Level 2-WM services [] Minimal risk of severe withdrawal    [] If withdrawal is present, manageable at Level 3. 2-WM  [] Minimal risk of severe withdrawal    [] If withdrawal is present manageable at Level 3. 2-WM [] High risk of withdrawal, but manageable at Level  3.7-WM and does not require  full resources of a licensed hospital [] At high risk of withdrawal and requires Level 4-WM and full resources of licensed hospital    COMMENTS:           Dimension 2   Biomedical Conditions and Complications  (BMC/C) [x] None or very stable    [] Receiving concurrent medical monitoring  [] None or not a distraction from treatment    [] Problems are manageable at Level 2.1 [] None or not sufficient to distract from treatment    [] Problems are manageable at  Level 2.5 [] None or stable    [] Receiving concurrent medical monitoring  [] None or stable    [] Receiving concurrent medical monitoring  [] None or stable    [] Receiving concurrent medical monitoring [] Requires 24-hour medical monitoring  but not intensive treatment [] Requires 24-hour medical & nursing care and the full  resources of a licensed hospital   COMMENTS:           Dimension 3  Emotional,  Behavioral,  or Cognitive Conditions and Complications   (EBC/C) [] None or very stable    [x] Receiving concurrent mental health monitoring [] Mild severity  with potential to distract from recovery; needs monitoring [] Mild to moderate severity, with potential to distract from recovery, needs stabilization [] None or minimal; not distracting to recovery    [] If  stable, a    co-occurring capable program is appropriate    [] If not stable, a co-occurring enhanced program is required [] Mild to moderate severity; needs structure to focus on recovery. Treatment should be designed to address significant cognitive deficits     [] If  stable, a  co-occurring capable program is appropriate    [] If not stable, a co-occurring enhanced program is required [] Demonstrates repeated inability to control impulses or unstable & dangerous signs/ symptoms require stabilization. Other functional deficits require stabilization and 24-hr.  setting to prepare for community integration  & continuing care    [] Co-occurring enhanced setting is required for those with severe & chronic mental illness [] Moderate severity;  needs 24-hour   structured setting    [] If client has co-occurring mental disorder, requires concurrent mental health services in a medically monitored setting  [] Severe and unstable problems;  requires 24-hour psychiatric care  with concomitant addiction treatment   COMMENTS:             Electronically signed by Kristyn Mary [place] 320 on 24/5/9529 at [address]      []Admissions  [x]Continued Stay []Discharge/Transfer / Complication in [place] HZWI:81/1/0552         Level of Care Level 1  Outpatient   Services Level 2.1  Intensive  Outpatient  Services Level 2.5  Partial Hospitalization Services Level 3.1  CLINICALLY Managed Low-intensity Residential Services Level 3.3  CLINICALLY Managed Population- Specific High- Intensity Residential Services Level 3.5  CLINICALLY Managed High-Intensive Residential Services Level 3.7  MEDICALLY Monitored Intensive Inpatient Services Level 4  MEDICALLY Managed Intensive Inpatient Services   Dimension 4  Readiness  To  Change [x] Ready for recovery but needs motivating and monitoring strategies to strengthen readiness    [x]Needs ongoing monitoring and disease management    [] High severity in this dimension but not in other dimensions. Needs Level 1 motivational enhancement strategies   [] Has variable engagement in treatment, ambivalence, or lack of awareness of substance use or mental health problems and requires structured program several times/wk. to promote progress through stages of change [] Has poor engagement in treatment, significant ambivalence, or lack of awareness of substance use or mental health problems, requires near daily structured program or intensive engagement to promote progress through stages of change [] Open to recovery, but needs structured environment to maintain therapeutic gains [] Has little awareness & needs interventions at Level 3.3 to engage & stay in treatment.     [] If there is high severity in this dimension, but not in any other dimension, motivational enhancement strategies should be provided in Level 1 [] Has marked difficulty with, or opposition to treatment with dangerous consequences    [] If there is high severity in this dimension, but not in any other dimension, motivational enhancement strategies should be provided in Level 1 [] Low interest in treatment and impulse control is poor despite negative consequences;  needs motivating strategies only safely available in 24-hour structured setting    [] If there is high severity in this dimension, but not in any other dimension, motivational enhancement strategies should be provided in Level 1 [] Problems in this dimension do not qualify the client for Level 4 services    [] If the client's only severity is in Dimension 4,5, and/or 6 without high severity in Dimensions 1,2, and/or 3, then client is not qualified for Level 4   COMMENTS:           Dimension 5  Relapse, Continued Use or Continued Problem Potential  [x] Able to maintain abstinence or control use and/or addictive behaviors  and pursue recovery or motivational goals with minimal support [] Intensification of addiction or mental health symptoms indicate high likelihood of relapse risk or continued use or continued problems without  close monitoring and support several times/wk.  [] Intensification of addiction or mental health symptoms, despite active participation in a Level 1 or 2.1 program, indicates high likelihood of relapse or continued use or continued problems without near-daily monitoring [] Understands relapse but needs structure to maintain therapeutic gains  [] Has little awareness and needs interventions available only at Level 3.3 to prevent continued use, with imminent dangerous consequences, because of cognitive deficits or  comparable dysfunction  [] Has no recognition  of the skills needed to prevent continued use,  with imminently dangerous  consequences [] Unable to control use, with imminently dangerous consequences,  despite active participation at less intensive levels of care [] Problems in this dimension do not qualify the client for Level 4 services    [] If the client's only severity is in Dimension 4,5, and/or 6 without high severity in Dimensions 1,2, and/or 3, then client is not qualified for Level 4   COMMENTS:           Dimension 6  Recovery/Living Environment [x]  Recovery environment is supportive and/or the client has skills to cope [] Recovery environment is not supportive  but with structure and support, the client can cope [] Recovery environment is not supportive, but with structure and support and relief from the home environment, client can cope [] Environment    is dangerous, but recovery is achievable if Level 3.1 24-hour structure is available  [] Environment is dangerous and  client needs 24-hour structure to learn to cope [] Environment is dangerous, and the client lacks skills to cope outside of a  highly structured 24-hour setting   [] Environment is dangerous, and the client lacks skills to cope outside of a  highly structured 24-hour setting [] Problems in this dimension do not qualify the client for Level 4 services    [] If the client's only severity is in Dimension 4,5, and/or 6 without high severity in Dimensions 1,2, and/or 3, then client is not qualified for Level 4   COMMENTS:             Electronically signed by Sonny Reyes on 83/9/9387 at 10:12 AM

## 2022-12-06 ENCOUNTER — APPOINTMENT (OUTPATIENT)
Dept: PSYCHIATRY | Age: 22
End: 2022-12-06
Payer: COMMERCIAL

## 2022-12-07 ENCOUNTER — APPOINTMENT (OUTPATIENT)
Dept: PSYCHIATRY | Age: 22
End: 2022-12-07
Payer: COMMERCIAL

## 2022-12-07 ENCOUNTER — HOSPITAL ENCOUNTER (OUTPATIENT)
Dept: PSYCHIATRY | Age: 22
Setting detail: THERAPIES SERIES
Discharge: HOME | End: 2022-12-07
Payer: COMMERCIAL

## 2022-12-07 PROCEDURE — 80305 DRUG TEST PRSMV DIR OPT OBS: CPT

## 2022-12-07 PROCEDURE — 90832 PSYTX W PT 30 MINUTES: CPT

## 2022-12-08 ENCOUNTER — APPOINTMENT (OUTPATIENT)
Dept: PSYCHIATRY | Age: 22
End: 2022-12-08
Payer: COMMERCIAL

## 2022-12-08 NOTE — PROGRESS NOTES
Date: 12/7/2022  Start time: 4:30  End time: 5:00     Patient's Goal: Maintain Sobriety     Notes: Client actively participated in the session. She reports continued sobriety and attended 3 meetings for the week. Today's session focused on eliminating consequences of addiction. Client verbalized how having positive routine, and not associating with using peers as coping skills that are aid her to maintain her sober lifestyle and eliminate consequences of substance addiction. Drug screen obtained on this date. Status After Intervention:  Improved    Participation Level: Active Listener and Interactive    Participation Quality: Appropriate, Attentive, and Sharing    Speech:  Normal rate and tone    Thought Process/Content: Logical    Level of consciousness:  Alert, Oriented x4, and Attentive    Response to Learning: Able to verbalize current knowledge/experience, Able to verbalize/acknowledge new learning, Able to retain information, Capable of insight, Able to change behavior, and Progressing to goal    Endings: None Reported    Discipline Responsible: /Counselor    [x] Check in completed and reviewed. Intervention required.  No  Electronically signed by Sonny Rivas on 53/8/9852 at 3:59 PM

## 2022-12-12 ENCOUNTER — APPOINTMENT (OUTPATIENT)
Dept: PSYCHIATRY | Age: 22
End: 2022-12-12
Payer: COMMERCIAL

## 2022-12-14 ENCOUNTER — HOSPITAL ENCOUNTER (OUTPATIENT)
Dept: PSYCHIATRY | Age: 22
Setting detail: THERAPIES SERIES
Discharge: HOME | End: 2022-12-14
Payer: COMMERCIAL

## 2022-12-14 ENCOUNTER — APPOINTMENT (OUTPATIENT)
Dept: PSYCHIATRY | Age: 22
End: 2022-12-14
Payer: COMMERCIAL

## 2022-12-15 NOTE — GROUP NOTE
Start Time: 430 PM   End Time: 530 PM   Number of participants:4  Type of group: Relapse Prevention  Mode of intervention: Education, Support, Socialization, Exploration, Clarifying, Problem-solving, Confrontation, Limit-setting, and Reality-testing  Topic: Relapse Prevention   Objective:  Explore how negative peer association leads to relapse behaviors and provide awareness to client to improve decision making. Note:  Client actively participated in the group session. She showed verification of attending (3) meetings. Client was able to verbalize 2-4 reasons why associating with negative peers increases risk for relapse and she shared how interacting with sober peers at meetings and being with her peer  is helpful to her sobriety. Status after intervention:Improved  Participation level: Active Listener and Interactive  Participation Quality: Appropriate, Attentive, Sharing, and Supportive  Speech: normal  Thought Process/Content:Logical  Mood/Affect: congruent  Self report: None Reported  Response to learning: Able to verbalize current knowledge/experience, Able to verbalize/acknowledge new learning, Able to retain information, Capable of insight, Able to change behavior, and Progressing to goal  Discipline Responsible: /Counselor    [x] Check in completed and reviewed. Intervention required.  No    Electronically signed by Kristyn Hicks [place] 320 on 11/68/5887 at 11:29 AM

## 2022-12-20 NOTE — PLAN OF CARE
[facility]- Level of Care Placement      []Admissions  [x]Continued Stay []Discharge/Transfer / Complication in [address] [place] [address] VKDE:91/34/0991    Client Sticker      Level of Care Level 1      Outpatient Services Level 2.1   Intensive Outpatient Services(IOP) Level 2.5   Partial Hospitalization Services Level 3.1 CLINICALLY Managed Low-Intensity Residential Services Level 3.3  CLINICALLY Managed Population- Specific High- Intensity Residential Services Level 3.5  CLINICALLY Managed High Intensive Residential Services Level 3.7  MEDICALLY Monitored Intensive Inpatient Services Level 4  MEDICALLY Managed Intensive Inpatient Services   Dimension 1  Acute Intoxication and/or Withdrawal Potential [x] Not experiencing significant withdrawal    [] Minimal  risk of severe  withdrawal [] Minimal  risk of severe  withdrawal    [] Manageable  at Level 2-WM [] Moderate  risk of severe withdrawal    [] Manageable at Level 2-WM [] No withdrawal risk or minimal or stable withdrawal     [] Concurrently receiving Level -WM or Level 2-WM services [] Minimal risk of severe withdrawal    [] If withdrawal is present, manageable at Level 3. 2-WM  [] Minimal risk of severe withdrawal    [] If withdrawal is present manageable at Level 3. 2-WM [] High risk of withdrawal, but manageable at Level  3.7-WM and does not require  full resources of a licensed hospital [] At high risk of withdrawal and requires Level 4-WM and full resources of licensed hospital    COMMENTS:           Dimension 2   Biomedical Conditions and Complications  (BMC/C) [x] None or very stable    [] Receiving concurrent medical monitoring  [] None or not a distraction from treatment    [] Problems are manageable at Level 2.1 [] None or not sufficient to distract from treatment    [] Problems are manageable at  Level 2.5 [] None or stable    [] Receiving concurrent medical monitoring  [] None or stable    [] Receiving concurrent medical monitoring  [] None or stable    [] Receiving concurrent medical monitoring [] Requires 24-hour medical monitoring  but not intensive treatment [] Requires 24-hour medical & nursing care and the full  resources of a licensed hospital   COMMENTS:           Dimension 3  Emotional,  Behavioral,  or Cognitive Conditions and Complications   (EBC/C) [] None or very stable    [x] Receiving concurrent mental health monitoring [] Mild severity  with potential to distract from recovery; needs monitoring [] Mild to moderate severity, with potential to distract from recovery, needs stabilization [] None or minimal; not distracting to recovery    [] If  stable, a    co-occurring capable program is appropriate    [] If not stable, a co-occurring enhanced program is required [] Mild to moderate severity; needs structure to focus on recovery. Treatment should be designed to address significant cognitive deficits     [] If  stable, a  co-occurring capable program is appropriate    [] If not stable, a co-occurring enhanced program is required [] Demonstrates repeated inability to control impulses or unstable & dangerous signs/ symptoms require stabilization. Other functional deficits require stabilization and 24-hr.  setting to prepare for community integration  & continuing care    [] Co-occurring enhanced setting is required for those with severe & chronic mental illness [] Moderate severity;  needs 24-hour   structured setting    [] If client has co-occurring mental disorder, requires concurrent mental health services in a medically monitored setting  [] Severe and unstable problems;  requires 24-hour psychiatric care  with concomitant addiction treatment   COMMENTS:             Staff Gary Blue  Date:2022                   [address] Placement      []Admissions  [x]Continued Stay []Discharge/Transfer / Complication in [place] Z    Client Sticker      Level of Care Level 1  Outpatient   Services Level 2.1  Intensive  Outpatient  Services Level 2.5  Partial Hospitalization Services Level 3.1  CLINICALLY Managed Low-intensity Residential Services Level 3.3  CLINICALLY Managed Population- Specific High- Intensity Residential Services Level 3.5  CLINICALLY Managed High-Intensive Residential Services Level 3.7  MEDICALLY Monitored Intensive Inpatient Services Level 4  MEDICALLY Managed Intensive Inpatient Services   Dimension 4  Readiness  To  Change [x] Ready for recovery but needs motivating and monitoring strategies to strengthen readiness    []Needs ongoing monitoring and disease management    [] High severity in this dimension but not in other dimensions. Needs Level 1 motivational enhancement strategies   [] Has variable engagement in treatment, ambivalence, or lack of awareness of substance use or mental health problems and requires structured program several times/wk. to promote progress through stages of change [] Has poor engagement in treatment, significant ambivalence, or lack of awareness of substance use or mental health problems, requires near daily structured program or intensive engagement to promote progress through stages of change [] Open to recovery, but needs structured environment to maintain therapeutic gains [] Has little awareness & needs interventions at Level 3.3 to engage & stay in treatment.     [] If there is high severity in this dimension, but not in any other dimension, motivational enhancement strategies should be provided in Level 1 [] Has marked difficulty with, or opposition to treatment with dangerous consequences    [] If there is high severity in this dimension, but not in any other dimension, motivational enhancement strategies should be provided in Level 1 [] Low interest in treatment and impulse control is poor despite negative consequences;  needs motivating strategies only safely available in 24-hour structured setting    [] If there is high severity in this dimension, but not in any other dimension, motivational enhancement strategies should be provided in Level 1 [] Problems in this dimension do not qualify the client for Level 4 services    [] If the client's only severity is in Dimension 4,5, and/or 6 without high severity in Dimensions 1,2, and/or 3, then client is not qualified for Level 4   COMMENTS:           Dimension 5  Relapse, Continued Use or Continued Problem Potential  [x] Able to maintain abstinence or control use and/or addictive behaviors  and pursue recovery or motivational goals with minimal support [] Intensification of addiction or mental health symptoms indicate high likelihood of relapse risk or continued use or continued problems without  close monitoring and support several times/wk.  [] Intensification of addiction or mental health symptoms, despite active participation in a Level 1 or 2.1 program, indicates high likelihood of relapse or continued use or continued problems without near-daily monitoring [] Understands relapse but needs structure to maintain therapeutic gains  [] Has little awareness and needs interventions available only at Level 3.3 to prevent continued use, with imminent dangerous consequences, because of cognitive deficits or  comparable dysfunction  [] Has no recognition  of the skills needed to prevent continued use,  with imminently dangerous  consequences [] Unable to control use, with imminently dangerous consequences,  despite active participation at less intensive levels of care [] Problems in this dimension do not qualify the client for Level 4 services    [] If the client's only severity is in Dimension 4,5, and/or 6 without high severity in Dimensions 1,2, and/or 3, then client is not qualified for Level 4   COMMENTS:           Dimension 6  Recovery/Living Environment [x]  Recovery environment is supportive and/or the client has skills to cope [] Recovery environment is not supportive  but with structure and support, the client can cope [] Recovery environment is not supportive, but with structure and support and relief from the home environment, client can cope [] Environment    is dangerous, but recovery is achievable if Level 3.1 24-hour structure is available  [] Environment is dangerous and  client needs 24-hour structure to learn to cope [] Environment is dangerous, and the client lacks skills to cope outside of a  highly structured 24-hour setting   [] Environment is dangerous, and the client lacks skills to cope outside of a  highly structured 24-hour setting [] Problems in this dimension do not qualify the client for Level 4 services    [] If the client's only severity is in Dimension 4,5, and/or 6 without high severity in Dimensions 1,2, and/or 3, then client is not qualified for Level 4   COMMENTS:               Staff: Jon ALVARADO_  Date:12/20/2022_

## 2022-12-21 ENCOUNTER — HOSPITAL ENCOUNTER (OUTPATIENT)
Dept: PSYCHIATRY | Age: 22
Setting detail: THERAPIES SERIES
Discharge: HOME | End: 2022-12-21
Payer: COMMERCIAL

## 2022-12-21 ENCOUNTER — APPOINTMENT (OUTPATIENT)
Dept: PSYCHIATRY | Age: 22
End: 2022-12-21
Payer: COMMERCIAL

## 2022-12-21 PROCEDURE — 80305 DRUG TEST PRSMV DIR OPT OBS: CPT

## 2022-12-21 PROCEDURE — H0005 ALCOHOL AND/OR DRUG SERVICES: HCPCS

## 2022-12-21 NOTE — PLAN OF CARE
Client discussed in treatment team today. Present:   Lorene Blunt, Radhames Smith, - Director, and [employer]. Current Status: Aftercare     Treatment goals:    [x] Relapse prevention  [x] Increase sober support  [x] Increase attendance in sober support groups  [x] [place] effectively with cravings and urges  [] Other:      Recommendation: Client is making good progress as evident by consistent meeting attendance, keeping mental health appointments, negative screens, and reports taking medication as prescribed. Client recommended to continue with aftercare treatment and she was made aware that Nurse Fernando Roger will no longer see her for mental health needs related to her medication she will need to get her medication from [facility] due to they are treating her trauma.   Electronically signed by Kristyn Mary [place] 320 on 42/37/3504 at 12:24 PM

## 2022-12-22 NOTE — GROUP NOTE
Date: 12-21-22  Start Time: 4:30  End Time: 5:30  Number of participants : 4  Type if group: relapse prevention  Mode of intervention: universality, altruism, socialization, group cohesiveness, imparting of information and interpersonal learning. Topic: reviewing our past week  Objective: To keep active in sobriety    Note: She is seeking a new sponsor because her recent sponsor is not active with her. She notes her family gaslights her by degrading her as a mother and continues to make negative statements toward her. She continues to work part time and has a very busy schedule. She denies having urges to use. She is in mental health counseling which has been beneficial for her. She plans to spend Scotty with at least one of her children. Status after intervention:Improved  Participation level: Active Listener and Interactive  Participation Quality: Appropriate, Attentive, and Sharing  Speech: normal  Thought Process/Content:Logical  Mood/Affect: calm and congruent  Self report: None Reported  Response to learning: Able to verbalize current knowledge/experience, Able to verbalize/acknowledge new learning, Able to retain information, Capable of insight, Able to change behavior, and Progressing to goal  Discipline Responsible: /Counselor    [x] Check in completed and reviewed. Intervention required.  No    Electronically signed by ROBERT Angel, ANDREE on 12/22/2022 at 4:17 PM

## 2022-12-28 ENCOUNTER — HOSPITAL ENCOUNTER (OUTPATIENT)
Dept: PSYCHIATRY | Age: 22
Setting detail: THERAPIES SERIES
Discharge: HOME | End: 2022-12-28
Payer: COMMERCIAL

## 2022-12-28 PROCEDURE — H0005 ALCOHOL AND/OR DRUG SERVICES: HCPCS

## 2022-12-29 NOTE — GROUP NOTE
Date: 12-28-22  Start Time: 4:30  End Time: 5:30  Number of participants: 4  Type of group: After-Care  Mode of intervention: imparting of information, universality, socialization, interpersonal learning, catharsis, and interpersonal learning  Topic: A review of the past week  Objective: To support each other in sobriety    Note: Cornelia Turpin verbalized she relapse in the past week. While she was visiting her cousin. Her cousin offered her ectasy and then she drank 3 shots after taking the ectasy. She likewise reported this to [facility]. She was informed that she would be transitioning back to IOP. She became angry and shut down for most of the rest of the group. On the positive side she found a sponsor. The group was upset with her as they asked why she did not call one of them and she responded that she acted out impulsively. Status after intervention:Unchanged  Participation level: Active Listener and Interactive  Participation Quality: Attentive and Sharing  Speech: loud  Thought Process/Content:Logical  Mood/Affect: irritable and angry  Self report: None Reported  Response to learning: Able to verbalize current knowledge/experience, Able to verbalize/acknowledge new learning, Able to retain information, Capable of insight, and Able to change behavior  Discipline Responsible: /Counselor    [x] Check in completed and reviewed. Intervention required.  No    Electronically signed by ROBERT River, OFEW on 12/29/2022 at 1:39 PM

## 2022-12-29 NOTE — PLAN OF CARE
[facility]- Level of Care Placement      []Admissions  []Continued Stay [x]Discharge/Transfer / Complication in [place] [id number]    Client Sticker      Level of Care Level 1      Outpatient Services Level 2.1   Intensive Outpatient Services(IOP) Level 2.5   Partial Hospitalization Services Level 3.1 CLINICALLY Managed Low-Intensity Residential Services Level 3.3  CLINICALLY Managed Population- Specific High- Intensity Residential Services Level 3.5  CLINICALLY Managed High Intensive Residential Services Level 3.7  MEDICALLY Monitored Intensive Inpatient Services Level 4  MEDICALLY Managed Intensive Inpatient Services   Dimension 1  Acute Intoxication and/or Withdrawal Potential [x] Not experiencing significant withdrawal    [] Minimal  risk of severe  withdrawal [] Minimal  risk of severe  withdrawal    [] Manageable  at Level 2-WM [] Moderate  risk of severe withdrawal    [] Manageable at Level 2-WM [] No withdrawal risk or minimal or stable withdrawal     [] Concurrently receiving Level -WM or Level 2-WM services [] Minimal risk of severe withdrawal    [] If withdrawal is present, manageable at Level 3. 2-WM  [] Minimal risk of severe withdrawal    [] If withdrawal is present manageable at Level 3. 2-WM [] High risk of withdrawal, but manageable at Level  3.7-WM and does not require  full resources of a licensed hospital [] At high risk of withdrawal and requires Level 4-WM and full resources of licensed hospital    COMMENTS:           Dimension 2   Biomedical Conditions and Complications  (BMC/C) [] None or very stable    [] Receiving concurrent medical monitoring  [x] None or not a distraction from treatment    [] Problems are manageable at Level 2.1 [] None or not sufficient to distract from treatment    [] Problems are manageable at  Level 2.5 [] None or stable    [] Receiving concurrent medical monitoring  [] None or stable    [] Receiving concurrent medical monitoring  [] None or stable    [] Receiving concurrent medical monitoring [] Requires 24-hour medical monitoring  but not intensive treatment [] Requires 24-hour medical & nursing care and the full  resources of a licensed hospital   COMMENTS:           Dimension 3  Emotional,  Behavioral,  or Cognitive Conditions and Complications   (EBC/C) [] None or very stable    [] Receiving concurrent mental health monitoring [x] Mild severity  with potential to distract from recovery; needs monitoring [] Mild to moderate severity, with potential to distract from recovery, needs stabilization [] None or minimal; not distracting to recovery    [] If  stable, a    co-occurring capable program is appropriate    [] If not stable, a co-occurring enhanced program is required [] Mild to moderate severity; needs structure to focus on recovery. Treatment should be designed to address significant cognitive deficits     [] If  stable, a  co-occurring capable program is appropriate    [] If not stable, a co-occurring enhanced program is required [] Demonstrates repeated inability to control impulses or unstable & dangerous signs/ symptoms require stabilization. Other functional deficits require stabilization and 24-hr.  setting to prepare for community integration  & continuing care    [] Co-occurring enhanced setting is required for those with severe & chronic mental illness [] Moderate severity;  needs 24-hour   structured setting    [] If client has co-occurring mental disorder, requires concurrent mental health services in a medically monitored setting  [] Severe and unstable problems;  requires 24-hour psychiatric care  with concomitant addiction treatment   COMMENTS:             Staff: Ann-Marie eNlson  Date:12/29/2022                   [address] Placement      []Admissions  []Continued Stay [x]Discharge/Transfer / Complication in [place] [id number]:29/[id number]    Client Sticker      Level of Care Level 1  Outpatient   Services Level 2.1  Intensive  Outpatient  Services Level 2.5  Partial Hospitalization Services Level 3.1  CLINICALLY Managed Low-intensity Residential Services Level 3.3  CLINICALLY Managed Population- Specific High- Intensity Residential Services Level 3.5  CLINICALLY Managed High-Intensive Residential Services Level 3.7  MEDICALLY Monitored Intensive Inpatient Services Level 4  MEDICALLY Managed Intensive Inpatient Services   Dimension 4  Readiness  To  Change [] Ready for recovery but needs motivating and monitoring strategies to strengthen readiness    []Needs ongoing monitoring and disease management    [] High severity in this dimension but not in other dimensions. Needs Level 1 motivational enhancement strategies   [x] Has variable engagement in treatment, ambivalence, or lack of awareness of substance use or mental health problems and requires structured program several times/wk. to promote progress through stages of change [] Has poor engagement in treatment, significant ambivalence, or lack of awareness of substance use or mental health problems, requires near daily structured program or intensive engagement to promote progress through stages of change [] Open to recovery, but needs structured environment to maintain therapeutic gains [] Has little awareness & needs interventions at Level 3.3 to engage & stay in treatment.     [] If there is high severity in this dimension, but not in any other dimension, motivational enhancement strategies should be provided in Level 1 [] Has marked difficulty with, or opposition to treatment with dangerous consequences    [] If there is high severity in this dimension, but not in any other dimension, motivational enhancement strategies should be provided in Level 1 [] Low interest in treatment and impulse control is poor despite negative consequences;  needs motivating strategies only safely available in 24-hour structured setting    [] If there is high severity in this dimension, but not in any other dimension, motivational enhancement strategies should be provided in Level 1 [] Problems in this dimension do not qualify the client for Level 4 services    [] If the client's only severity is in Dimension 4,5, and/or 6 without high severity in Dimensions 1,2, and/or 3, then client is not qualified for Level 4   COMMENTS:           Dimension 5  Relapse, Continued Use or Continued Problem Potential  [] Able to maintain abstinence or control use and/or addictive behaviors  and pursue recovery or motivational goals with minimal support [x] Intensification of addiction or mental health symptoms indicate high likelihood of relapse risk or continued use or continued problems without  close monitoring and support several times/wk.  [] Intensification of addiction or mental health symptoms, despite active participation in a Level 1 or 2.1 program, indicates high likelihood of relapse or continued use or continued problems without near-daily monitoring [] Understands relapse but needs structure to maintain therapeutic gains  [] Has little awareness and needs interventions available only at Level 3.3 to prevent continued use, with imminent dangerous consequences, because of cognitive deficits or  comparable dysfunction  [] Has no recognition  of the skills needed to prevent continued use,  with imminently dangerous  consequences [] Unable to control use, with imminently dangerous consequences,  despite active participation at less intensive levels of care [] Problems in this dimension do not qualify the client for Level 4 services    [] If the client's only severity is in Dimension 4,5, and/or 6 without high severity in Dimensions 1,2, and/or 3, then client is not qualified for Level 4   COMMENTS:           Dimension 6  Recovery/Living Environment []  Recovery environment is supportive and/or the client has skills to cope [x] Recovery environment is not supportive  but with structure and support, the client can cope [] Recovery environment is not supportive, but with structure and support and relief from the home environment, client can cope [] Environment    is dangerous, but recovery is achievable if Level 3.1 24-hour structure is available  [] Environment is dangerous and  client needs 24-hour structure to learn to cope [] Environment is dangerous, and the client lacks skills to cope outside of a  highly structured 24-hour setting   [] Environment is dangerous, and the client lacks skills to cope outside of a  highly structured 24-hour setting [] Problems in this dimension do not qualify the client for Level 4 services    [] If the client's only severity is in Dimension 4,5, and/or 6 without high severity in Dimensions 1,2, and/or 3, then client is not qualified for Level 4   COMMENTS:               Staff:Vikas [address] [place] [address] Date:12/29/2022_

## 2023-01-04 NOTE — PLAN OF CARE
[facility] [place] Level of Care Placement      []Admissions  []Continued Stay [x]Discharge/Transfer / Complication in [address] Relapse 1/2/2023 Date:1/4/2023          Level of Care Level 1      Outpatient Services Level 2.1   Intensive Outpatient Services(IOP) Level 2.5   Partial Hospitalization Services Level 3.1 CLINICALLY Managed Low-Intensity Residential Services Level 3.3  CLINICALLY Managed Population- Specific High- Intensity Residential Services Level 3.5  CLINICALLY Managed High Intensive Residential Services Level 3.7  MEDICALLY Monitored Intensive Inpatient Services Level 4  MEDICALLY Managed Intensive Inpatient Services   Dimension 1  Acute Intoxication and/or Withdrawal Potential [] Not experiencing significant withdrawal    [] Minimal  risk of severe  withdrawal [] Minimal  risk of severe  withdrawal    [] Manageable  at Level 2-WM [] Moderate  risk of severe withdrawal    [] Manageable at Level 2-WM [] No withdrawal risk or minimal or stable withdrawal     [] Concurrently receiving Level -WM or Level 2-WM services [x] Minimal risk of severe withdrawal    [] If withdrawal is present, manageable at Level 3. 2-WM  [] Minimal risk of severe withdrawal    [] If withdrawal is present manageable at Level 3. 2-WM [] High risk of withdrawal, but manageable at Level  3.7-WM and does not require  full resources of a licensed hospital [] At high risk of withdrawal and requires Level 4- and full resources of licensed hospital    COMMENTS:           Dimension 2   Biomedical Conditions and Complications  (BMC/C) [x] None or very stable    [] Receiving concurrent medical monitoring  [] None or not a distraction from treatment    [] Problems are manageable at Level 2.1 [] None or not sufficient to distract from treatment    [] Problems are manageable at  Level 2.5 [] None or stable    [] Receiving concurrent medical monitoring  [] None or stable    [] Receiving concurrent medical monitoring  [] None or stable    [] Receiving concurrent medical monitoring [] Requires 24-hour medical monitoring  but not intensive treatment [] Requires 24-hour medical & nursing care and the full  resources of a licensed hospital   COMMENTS:           Dimension 3  Emotional,  Behavioral,  or Cognitive Conditions and Complications   (EBC/C) [] None or very stable    [] Receiving concurrent mental health monitoring [] Mild severity  with potential to distract from recovery; needs monitoring [] Mild to moderate severity, with potential to distract from recovery, needs stabilization [] None or minimal; not distracting to recovery    [] If  stable, a    co-occurring capable program is appropriate    [] If not stable, a co-occurring enhanced program is required [x] Mild to moderate severity; needs structure to focus on recovery. Treatment should be designed to address significant cognitive deficits     [] If  stable, a  co-occurring capable program is appropriate    [] If not stable, a co-occurring enhanced program is required [] Demonstrates repeated inability to control impulses or unstable & dangerous signs/ symptoms require stabilization. Other functional deficits require stabilization and 24-hr.  setting to prepare for community integration  & continuing care    [] Co-occurring enhanced setting is required for those with severe & chronic mental illness [] Moderate severity;  needs 24-hour   structured setting    [] If client has co-occurring mental disorder, requires concurrent mental health services in a medically monitored setting  [] Severe and unstable problems;  requires 24-hour psychiatric care  with concomitant addiction treatment   COMMENTS:             Electronically signed by Kristyn Lopez [place] [address] on 9/4/4501 at 3:28 PM                  [address] Placement      []Admissions  []Continued Stay [x]Discharge/Transfer / Complication in [place]- Relapse 1/2/23 Date:1/4/2023          Level of Care Level 1  Outpatient   Services Level 2.1  Intensive  Outpatient  Services Level 2.5  Partial Hospitalization Services Level 3.1  CLINICALLY Managed Low-intensity Residential Services Level 3.3  CLINICALLY Managed Population- Specific High- Intensity Residential Services Level 3.5  CLINICALLY Managed High-Intensive Residential Services Level 3.7  MEDICALLY Monitored Intensive Inpatient Services Level 4  MEDICALLY Managed Intensive Inpatient Services   Dimension 4  Readiness  To  Change [] Ready for recovery but needs motivating and monitoring strategies to strengthen readiness    []Needs ongoing monitoring and disease management    [] High severity in this dimension but not in other dimensions. Needs Level 1 motivational enhancement strategies   [] Has variable engagement in treatment, ambivalence, or lack of awareness of substance use or mental health problems and requires structured program several times/wk. to promote progress through stages of change [] Has poor engagement in treatment, significant ambivalence, or lack of awareness of substance use or mental health problems, requires near daily structured program or intensive engagement to promote progress through stages of change [] Open to recovery, but needs structured environment to maintain therapeutic gains [x] Has little awareness & needs interventions at Level 3.3 to engage & stay in treatment.     [] If there is high severity in this dimension, but not in any other dimension, motivational enhancement strategies should be provided in Level 1 [] Has marked difficulty with, or opposition to treatment with dangerous consequences    [] If there is high severity in this dimension, but not in any other dimension, motivational enhancement strategies should be provided in Level 1 [] Low interest in treatment and impulse control is poor despite negative consequences;  needs motivating strategies only safely available in 24-hour structured setting    [] If there is high severity in this dimension, but not in any other dimension, motivational enhancement strategies should be provided in Level 1 [] Problems in this dimension do not qualify the client for Level 4 services    [] If the client's only severity is in Dimension 4,5, and/or 6 without high severity in Dimensions 1,2, and/or 3, then client is not qualified for Level 4   COMMENTS:           Dimension 5  Relapse, Continued Use or Continued Problem Potential  [] Able to maintain abstinence or control use and/or addictive behaviors  and pursue recovery or motivational goals with minimal support [] Intensification of addiction or mental health symptoms indicate high likelihood of relapse risk or continued use or continued problems without  close monitoring and support several times/wk.  [] Intensification of addiction or mental health symptoms, despite active participation in a Level 1 or 2.1 program, indicates high likelihood of relapse or continued use or continued problems without near-daily monitoring [] Understands relapse but needs structure to maintain therapeutic gains  [x] Has little awareness and needs interventions available only at Level 3.3 to prevent continued use, with imminent dangerous consequences, because of cognitive deficits or  comparable dysfunction  [] Has no recognition  of the skills needed to prevent continued use,  with imminently dangerous  consequences [] Unable to control use, with imminently dangerous consequences,  despite active participation at less intensive levels of care [] Problems in this dimension do not qualify the client for Level 4 services    [] If the client's only severity is in Dimension 4,5, and/or 6 without high severity in Dimensions 1,2, and/or 3, then client is not qualified for Level 4   COMMENTS:           Dimension 6  Recovery/Living Environment []  Recovery environment is supportive and/or the client has skills to cope [] Recovery environment is not supportive  but with structure and support, the client can cope [] Recovery environment is not supportive, but with structure and support and relief from the home environment, client can cope [] Environment    is dangerous, but recovery is achievable if Level 3.1 24-hour structure is available  [x] Environment is dangerous and  client needs 24-hour structure to learn to cope [] Environment is dangerous, and the client lacks skills to cope outside of a  highly structured 24-hour setting   [] Environment is dangerous, and the client lacks skills to cope outside of a  highly structured 24-hour setting [] Problems in this dimension do not qualify the client for Level 4 services    [] If the client's only severity is in Dimension 4,5, and/or 6 without high severity in Dimensions 1,2, and/or 3, then client is not qualified for Level 4   COMMENTS:               Electronically signed by Sonny Garcia Jenner on 4/2/3264 at 3:29 PM
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
## 2023-05-25 ENCOUNTER — HOSPITAL ENCOUNTER (OUTPATIENT)
Dept: CT IMAGING | Age: 23
Discharge: HOME | End: 2023-05-25
Payer: COMMERCIAL

## 2023-05-25 DIAGNOSIS — D33.2 BENIGN NEOPLASM OF BRAIN, UNSPECIFIED BRAIN REGION (HCC): ICD-10-CM

## 2023-05-25 PROCEDURE — 70450 CT HEAD/BRAIN W/O DYE: CPT

## 2023-07-23 ENCOUNTER — HOSPITAL ENCOUNTER (EMERGENCY)
Age: 23
Discharge: HOME | End: 2023-07-23
Attending: EMERGENCY MEDICINE
Payer: COMMERCIAL

## 2023-07-23 VITALS
HEART RATE: 96 BPM | OXYGEN SATURATION: 98 % | RESPIRATION RATE: 16 BRPM | BODY MASS INDEX: 42.45 KG/M2 | WEIGHT: 263 LBS | SYSTOLIC BLOOD PRESSURE: 136 MMHG | TEMPERATURE: 97.5 F | DIASTOLIC BLOOD PRESSURE: 87 MMHG

## 2023-07-23 DIAGNOSIS — N39.0 URINARY TRACT INFECTION WITHOUT HEMATURIA, SITE UNSPECIFIED: Primary | ICD-10-CM

## 2023-07-23 LAB
BACTERIA URNS QL MICRO: ABNORMAL
BILIRUB UR QL STRIP: NEGATIVE
CLARITY UR: ABNORMAL
COLOR UR: YELLOW
EPI CELLS #/AREA URNS HPF: ABNORMAL /HPF
GLUCOSE UR STRIP-MCNC: NEGATIVE MG/DL
HCG UR QL: NEGATIVE
HGB UR QL STRIP.AUTO: ABNORMAL
KETONES UR STRIP-MCNC: ABNORMAL MG/DL
LEUKOCYTE ESTERASE UR QL STRIP: ABNORMAL
NITRITE UR QL STRIP: NEGATIVE
PH UR STRIP: 6 [PH] (ref 5–9)
PROT UR STRIP-MCNC: 100 MG/DL
RBC #/AREA URNS HPF: ABNORMAL /HPF
SP GR UR STRIP: >1.03 (ref 1–1.03)
UROBILINOGEN UR STRIP-ACNC: 0.2 EU/DL (ref 0–1)
WBC #/AREA URNS HPF: ABNORMAL /HPF

## 2023-07-23 PROCEDURE — 99283 EMERGENCY DEPT VISIT LOW MDM: CPT

## 2023-07-23 PROCEDURE — 84703 CHORIONIC GONADOTROPIN ASSAY: CPT

## 2023-07-23 PROCEDURE — 81001 URINALYSIS AUTO W/SCOPE: CPT

## 2023-07-23 RX ORDER — NITROFURANTOIN 25; 75 MG/1; MG/1
100 CAPSULE ORAL 2 TIMES DAILY
Qty: 20 CAPSULE | Refills: 0 | Status: SHIPPED | OUTPATIENT
Start: 2023-07-23 | End: 2023-08-02

## 2023-07-23 RX ORDER — PHENAZOPYRIDINE HYDROCHLORIDE 100 MG/1
100 TABLET, FILM COATED ORAL 3 TIMES DAILY PRN
Qty: 6 TABLET | Refills: 0 | Status: SHIPPED | OUTPATIENT
Start: 2023-07-23 | End: 2023-07-26

## 2023-07-23 RX ORDER — LANOLIN ALCOHOL/MO/W.PET/CERES
3 CREAM (GRAM) TOPICAL DAILY
COMMUNITY

## 2023-07-23 RX ORDER — DIVALPROEX SODIUM 500 MG/1
500 TABLET, DELAYED RELEASE ORAL 3 TIMES DAILY
COMMUNITY

## 2023-07-23 RX ORDER — QUETIAPINE 200 MG/1
200 TABLET, FILM COATED, EXTENDED RELEASE ORAL NIGHTLY
COMMUNITY

## 2023-07-23 RX ORDER — HYDROXYZINE PAMOATE 50 MG/1
50 CAPSULE ORAL 3 TIMES DAILY PRN
COMMUNITY

## 2023-07-23 ASSESSMENT — PAIN - FUNCTIONAL ASSESSMENT
PAIN_FUNCTIONAL_ASSESSMENT: 0-10
PAIN_FUNCTIONAL_ASSESSMENT: 0-10

## 2023-07-23 ASSESSMENT — ENCOUNTER SYMPTOMS
BACK PAIN: 0
NAUSEA: 0
VOMITING: 0
ABDOMINAL PAIN: 0
SHORTNESS OF BREATH: 0
BLOOD IN STOOL: 0
COUGH: 0

## 2023-07-23 ASSESSMENT — PAIN DESCRIPTION - LOCATION: LOCATION: ABDOMEN

## 2023-07-23 ASSESSMENT — PAIN DESCRIPTION - ORIENTATION: ORIENTATION: ANTERIOR;LOWER

## 2023-07-23 ASSESSMENT — PAIN DESCRIPTION - DESCRIPTORS: DESCRIPTORS: PRESSURE

## 2023-07-23 ASSESSMENT — PAIN SCALES - GENERAL
PAINLEVEL_OUTOF10: 4
PAINLEVEL_OUTOF10: 4

## 2023-07-23 NOTE — ED PROVIDER NOTES
The history is provided by the patient. Dysuria   This is a new problem. The current episode started more than 2 days ago. The problem occurs every urination. The quality of the pain is described as burning. The pain is mild. There has been no fever. Associated symptoms include frequency and urgency. Pertinent negatives include no chills, no nausea, no vomiting, no hematuria and no flank pain. Review of Systems   Constitutional:  Negative for chills and fever. Respiratory:  Negative for cough and shortness of breath. Cardiovascular:  Negative for chest pain. Gastrointestinal:  Negative for abdominal pain, blood in stool, nausea and vomiting. Genitourinary:  Positive for dysuria, frequency and urgency. Negative for flank pain and hematuria. Musculoskeletal:  Negative for back pain. Skin:  Negative for rash. Neurological:  Negative for weakness and headaches. All other systems reviewed and are negative. Physical Exam  Vitals and nursing note reviewed. Constitutional:       Appearance: She is well-developed. HENT:      Head: Normocephalic and atraumatic. Right Ear: Hearing and external ear normal.      Left Ear: Hearing and external ear normal.      Nose: Nose normal.      Mouth/Throat:      Pharynx: Uvula midline. Eyes:      General: Lids are normal.      Conjunctiva/sclera: Conjunctivae normal.      Pupils: Pupils are equal, round, and reactive to light. Cardiovascular:      Rate and Rhythm: Normal rate and regular rhythm. Heart sounds: Normal heart sounds. No murmur heard. Pulmonary:      Effort: Pulmonary effort is normal. No respiratory distress. Breath sounds: Normal breath sounds. No wheezing or rales. Abdominal:      General: Bowel sounds are normal.      Palpations: Abdomen is soft. Abdomen is not rigid. Tenderness: There is no abdominal tenderness. There is no guarding or rebound.    Musculoskeletal:      Cervical back: Normal range of motion and

## 2024-08-03 ENCOUNTER — HOSPITAL ENCOUNTER (EMERGENCY)
Dept: HOSPITAL 83 - ED | Age: 24
Discharge: HOME | End: 2024-08-03
Payer: SELF-PAY

## 2024-08-03 DIAGNOSIS — F10.129: ICD-10-CM

## 2024-08-03 DIAGNOSIS — F43.20: Primary | ICD-10-CM

## 2024-08-03 DIAGNOSIS — Z91.018: ICD-10-CM
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
## 2024-08-03 DIAGNOSIS — E11.9: ICD-10-CM

## 2024-08-03 LAB
ALP SERPL-CCNC: 83 U/L (ref 46–116)
ALT SERPL W P-5'-P-CCNC: 54 U/L (ref 5–49)
BASOPHILS # BLD AUTO: 0.1 10*3/UL (ref 0–0.1)
BASOPHILS NFR BLD AUTO: 0.8 % (ref 0–1)
BUN SERPL-MCNC: 5 MG/DL (ref 9–23)
CHLORIDE SERPL-SCNC: 108 MMOL/L (ref 98–107)
EOSINOPHIL # BLD AUTO: 0.1 10*3/UL (ref 0–0.4)
EOSINOPHIL # BLD AUTO: 0.7 % (ref 1–4)
EPI CELLS #/AREA URNS HPF: (no result) /[HPF]
ERYTHROCYTE [DISTWIDTH] IN BLOOD BY AUTOMATED COUNT: 13.3 % (ref 0–14.5)
ETHANOL SERPL-MCNC: 185.4 MG/DL (ref ?–3)
HCT VFR BLD AUTO: 39.1 % (ref 37–47)
LYMPHOCYTES # BLD AUTO: 3.7 10*3/UL (ref 1.3–4.4)
LYMPHOCYTES NFR BLD AUTO: 40.6 % (ref 27–41)
MCH RBC QN AUTO: 29 PG (ref 27–31)
MCHC RBC AUTO-ENTMCNC: 33.2 G/DL (ref 33–37)
MCV RBC AUTO: 87.3 FL (ref 81–99)
MONOCYTES # BLD AUTO: 0.6 10*3/UL (ref 0.1–1)
MONOCYTES NFR BLD MANUAL: 7 % (ref 3–9)
NEUT #: 4.6 10*3/UL (ref 2.3–7.9)
NEUT %: 50.5 % (ref 47–73)
NRBC BLD QL AUTO: 0 10*3/UL (ref 0–0)
PH UR STRIP: 6 [PH] (ref 4.5–8)
PLATELET # BLD AUTO: 442 10*3/UL (ref 130–400)
PMV BLD AUTO: 9.1 FL (ref 9.6–12.3)
POTASSIUM SERPL-SCNC: 3.9 MMOL/L (ref 3.4–5.1)
PROT SERPL-MCNC: 8.2 GM/DL (ref 6–8)
RBC # BLD AUTO: 4.48 10*6/UL (ref 4.1–5.1)
RBC #/AREA URNS HPF: (no result) RBC/HPF (ref 0–2)
SP GR UR: <= 1.005 (ref 1–1.03)
UROBILINOGEN UR STRIP-MCNC: 0.2 E.U./DL (ref 0–1)
WBC #/AREA URNS HPF: (no result) WBC/HPF (ref 0–5)
WBC NRBC COR # BLD AUTO: 9.1 10*3/UL (ref 4.8–10.8)

## 2024-10-02 ENCOUNTER — HOSPITAL ENCOUNTER (EMERGENCY)
Age: 24
Discharge: HOME | End: 2024-10-03
Attending: EMERGENCY MEDICINE
Payer: COMMERCIAL

## 2024-10-02 VITALS
OXYGEN SATURATION: 96 % | RESPIRATION RATE: 16 BRPM | SYSTOLIC BLOOD PRESSURE: 149 MMHG | TEMPERATURE: 97.6 F | HEART RATE: 98 BPM | DIASTOLIC BLOOD PRESSURE: 87 MMHG

## 2024-10-02 DIAGNOSIS — F39 MOOD DISORDER (HCC): Primary | ICD-10-CM

## 2024-10-02 DIAGNOSIS — F14.10 COCAINE ABUSE (HCC): ICD-10-CM

## 2024-10-02 LAB
ALBUMIN SERPL-MCNC: 4.6 G/DL (ref 3.5–5.2)
ALP SERPL-CCNC: 78 U/L (ref 35–104)
ALT SERPL-CCNC: 106 U/L (ref 0–32)
AMPHET UR QL SCN: NEGATIVE
ANION GAP SERPL CALCULATED.3IONS-SCNC: 17 MMOL/L (ref 7–16)
APAP SERPL-MCNC: <5 UG/ML (ref 10–30)
AST SERPL-CCNC: 80 U/L (ref 0–31)
BACTERIA URNS QL MICRO: ABNORMAL
BARBITURATES UR QL SCN: NEGATIVE
BASOPHILS # BLD: 0.08 K/UL (ref 0–0.2)
BASOPHILS NFR BLD: 1 % (ref 0–2)
BENZODIAZ UR QL: NEGATIVE
BILIRUB SERPL-MCNC: 0.3 MG/DL (ref 0–1.2)
BILIRUB UR QL STRIP: NEGATIVE
BUN SERPL-MCNC: 9 MG/DL (ref 6–20)
BUPRENORPHINE UR QL: NEGATIVE
CALCIUM SERPL-MCNC: 9.8 MG/DL (ref 8.6–10.2)
CANNABINOIDS UR QL SCN: NEGATIVE
CHLORIDE SERPL-SCNC: 100 MMOL/L (ref 98–107)
CLARITY UR: CLEAR
CO2 SERPL-SCNC: 25 MMOL/L (ref 22–29)
COCAINE UR QL SCN: POSITIVE
COLOR UR: YELLOW
CREAT SERPL-MCNC: 0.6 MG/DL (ref 0.5–1)
CRYSTALS URNS MICRO: ABNORMAL /HPF
EOSINOPHIL # BLD: 0.12 K/UL (ref 0.05–0.5)
EOSINOPHILS RELATIVE PERCENT: 1 % (ref 0–6)
EPI CELLS #/AREA URNS HPF: ABNORMAL /HPF
ERYTHROCYTE [DISTWIDTH] IN BLOOD BY AUTOMATED COUNT: 13.2 % (ref 11.5–15)
ETHANOLAMINE SERPL-MCNC: <10 MG/DL (ref 0–0.08)
FENTANYL UR QL: NEGATIVE
GFR, ESTIMATED: >90 ML/MIN/1.73M2
GLUCOSE SERPL-MCNC: 114 MG/DL (ref 74–99)
GLUCOSE UR STRIP-MCNC: NEGATIVE MG/DL
HCG, URINE, POC: NEGATIVE
HCT VFR BLD AUTO: 38 % (ref 34–48)
HGB BLD-MCNC: 12.4 G/DL (ref 11.5–15.5)
HGB UR QL STRIP.AUTO: NEGATIVE
IMM GRANULOCYTES # BLD AUTO: 0.04 K/UL (ref 0–0.58)
IMM GRANULOCYTES NFR BLD: 0 % (ref 0–5)
KETONES UR STRIP-MCNC: NEGATIVE MG/DL
LEUKOCYTE ESTERASE UR QL STRIP: NEGATIVE
LYMPHOCYTES NFR BLD: 3.13 K/UL (ref 1.5–4)
LYMPHOCYTES RELATIVE PERCENT: 33 % (ref 20–42)
Lab: NORMAL
MCH RBC QN AUTO: 29 PG (ref 26–35)
MCHC RBC AUTO-ENTMCNC: 32.6 G/DL (ref 32–34.5)
MCV RBC AUTO: 88.8 FL (ref 80–99.9)
METHADONE UR QL: NEGATIVE
MONOCYTES NFR BLD: 0.73 K/UL (ref 0.1–0.95)
MONOCYTES NFR BLD: 8 % (ref 2–12)
MUCOUS THREADS URNS QL MICRO: PRESENT
NEGATIVE QC PASS/FAIL: NORMAL
NEUTROPHILS NFR BLD: 57 % (ref 43–80)
NEUTS SEG NFR BLD: 5.49 K/UL (ref 1.8–7.3)
NITRITE UR QL STRIP: NEGATIVE
OPIATES UR QL SCN: NEGATIVE
OXYCODONE UR QL SCN: NEGATIVE
PCP UR QL SCN: NEGATIVE
PH UR STRIP: 6 [PH] (ref 5–9)
PLATELET # BLD AUTO: 407 K/UL (ref 130–450)
PMV BLD AUTO: 10 FL (ref 7–12)
POSITIVE QC PASS/FAIL: NORMAL
POTASSIUM SERPL-SCNC: 3.5 MMOL/L (ref 3.5–5)
PROT SERPL-MCNC: 8 G/DL (ref 6.4–8.3)
PROT UR STRIP-MCNC: 30 MG/DL
RBC # BLD AUTO: 4.28 M/UL (ref 3.5–5.5)
RBC #/AREA URNS HPF: ABNORMAL /HPF
SALICYLATES SERPL-MCNC: <0.3 MG/DL (ref 0–30)
SODIUM SERPL-SCNC: 142 MMOL/L (ref 132–146)
SP GR UR STRIP: >1.03 (ref 1–1.03)
T4 FREE SERPL-MCNC: 1.2 NG/DL (ref 0.9–1.7)
TEST INFORMATION: ABNORMAL
TOXIC TRICYCLIC SC,BLOOD: NEGATIVE
TSH SERPL DL<=0.05 MIU/L-ACNC: 1.64 UIU/ML (ref 0.27–4.2)
UROBILINOGEN UR STRIP-ACNC: 0.2 EU/DL (ref 0–1)
WBC #/AREA URNS HPF: ABNORMAL /HPF
WBC OTHER # BLD: 9.6 K/UL (ref 4.5–11.5)

## 2024-10-02 PROCEDURE — 80143 DRUG ASSAY ACETAMINOPHEN: CPT

## 2024-10-02 PROCEDURE — 80179 DRUG ASSAY SALICYLATE: CPT

## 2024-10-02 PROCEDURE — 80307 DRUG TEST PRSMV CHEM ANLYZR: CPT

## 2024-10-02 PROCEDURE — 93005 ELECTROCARDIOGRAM TRACING: CPT | Performed by: PHYSICIAN ASSISTANT

## 2024-10-02 PROCEDURE — 99284 EMERGENCY DEPT VISIT MOD MDM: CPT

## 2024-10-02 PROCEDURE — 80053 COMPREHEN METABOLIC PANEL: CPT

## 2024-10-02 PROCEDURE — 81001 URINALYSIS AUTO W/SCOPE: CPT

## 2024-10-02 PROCEDURE — G0480 DRUG TEST DEF 1-7 CLASSES: HCPCS

## 2024-10-02 PROCEDURE — 84443 ASSAY THYROID STIM HORMONE: CPT

## 2024-10-02 PROCEDURE — 84439 ASSAY OF FREE THYROXINE: CPT

## 2024-10-02 PROCEDURE — 85025 COMPLETE CBC W/AUTO DIFF WBC: CPT

## 2024-10-02 ASSESSMENT — LIFESTYLE VARIABLES
HOW OFTEN DO YOU HAVE A DRINK CONTAINING ALCOHOL: NEVER
HOW MANY STANDARD DRINKS CONTAINING ALCOHOL DO YOU HAVE ON A TYPICAL DAY: PATIENT DOES NOT DRINK

## 2024-10-02 NOTE — ED NOTES
The following labs were labeled with appropriate pt sticker and tubed to lab:     [] Blue     [x] Lavender   [] on ice  [x] Green/yellow  [] Green/black [] on ice  [] Grey  [] on ice  [] Yellow  [x] Red  [] Pink  [] Type/ Screen  [] ABG  [] VBG    [] COVID-19 swab    [] Rapid  [] PCR  [] Flu swab  [] Peds Viral Panel     [x] Urine Sample  [] Fecal Sample  [] Pelvic Cultures  [] Blood Cultures  [] X 2  [] STREP Cultures  [] Wound Cultures
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
## 2024-10-02 NOTE — ED NOTES
SW spoke with Pt for assessment. Pt stated she wanted to return to the Crisis Unit. SW spoke with the Crisis unit and faxed assessment/documentation. Awaiting call back.   ANDREE Orlando

## 2024-10-03 LAB
EKG ATRIAL RATE: 96 BPM
EKG P AXIS: 22 DEGREES
EKG P-R INTERVAL: 156 MS
EKG Q-T INTERVAL: 382 MS
EKG QRS DURATION: 94 MS
EKG QTC CALCULATION (BAZETT): 482 MS
EKG R AXIS: 2 DEGREES
EKG T AXIS: 42 DEGREES
EKG VENTRICULAR RATE: 96 BPM

## 2024-10-03 PROCEDURE — 93010 ELECTROCARDIOGRAM REPORT: CPT | Performed by: INTERNAL MEDICINE

## 2024-10-03 NOTE — ED NOTES
ARIEL received a call from Natalie at [facility] crisis unit, patient has been accepted back. ARIEL to arrange transport and call crisis unit back with ETA.

## 2024-10-03 NOTE — ED NOTES
ARIEL called [facility] [phone], to arrange transport for patient to [facility] crisis unit. ARIEL spoke to \"Megan\" who will call ARIEL back with ETA for taxi.

## 2024-10-03 NOTE — ED NOTES
AIREL contacted [place] crisis unit to check on referral status. ARIEL spoke to Natalie who said she needed more information. ARIEL read from ED provider note, patient reporting depression. Natalie to consult to see if able to take patient back.

## 2024-10-03 NOTE — ED NOTES
ARIEL received a call from [employer], ETA for taxi \"about a half hour.\" ARIEL updated triage. ARIEL contacted [place] crisis unit to give ETA.

## 2024-10-28 ENCOUNTER — HOSPITAL ENCOUNTER (OUTPATIENT)
Age: 24
Discharge: HOME | End: 2024-10-28
Payer: COMMERCIAL

## 2024-10-28 PROCEDURE — 36415 COLL VENOUS BLD VENIPUNCTURE: CPT

## 2024-10-28 PROCEDURE — 83516 IMMUNOASSAY NONANTIBODY: CPT

## 2024-11-01 LAB
TISSUE TRANSGLUTAMINASE ANTIBODY IGG: <0.6 U/ML
TTG IGA SER IA-ACNC: 0.7 U/ML

## 2024-11-18 ENCOUNTER — HOSPITAL ENCOUNTER (EMERGENCY)
Age: 24
Discharge: HOME | End: 2024-11-18
Attending: EMERGENCY MEDICINE
Payer: COMMERCIAL

## 2024-11-18 VITALS
SYSTOLIC BLOOD PRESSURE: 139 MMHG | RESPIRATION RATE: 16 BRPM | DIASTOLIC BLOOD PRESSURE: 91 MMHG | TEMPERATURE: 98.3 F | HEART RATE: 86 BPM | OXYGEN SATURATION: 96 %

## 2024-11-18 DIAGNOSIS — R11.0 NAUSEA: Primary | ICD-10-CM

## 2024-11-18 DIAGNOSIS — K21.9 GASTROESOPHAGEAL REFLUX DISEASE WITHOUT ESOPHAGITIS: ICD-10-CM

## 2024-11-18 DIAGNOSIS — Z02.89 ENCOUNTER TO OBTAIN EXCUSE FROM WORK: ICD-10-CM

## 2024-11-18 PROCEDURE — 99283 EMERGENCY DEPT VISIT LOW MDM: CPT

## 2024-11-18 RX ORDER — TRAZODONE HYDROCHLORIDE 100 MG/1
100 TABLET ORAL NIGHTLY
COMMUNITY

## 2024-11-18 RX ORDER — OXCARBAZEPINE 60 MG/ML
SUSPENSION ORAL 2 TIMES DAILY
COMMUNITY

## 2024-11-18 RX ORDER — PANTOPRAZOLE SODIUM 20 MG/1
20 TABLET, DELAYED RELEASE ORAL DAILY
Qty: 30 TABLET | Refills: 0 | Status: SHIPPED | OUTPATIENT
Start: 2024-11-18

## 2024-11-18 RX ORDER — ONDANSETRON 4 MG/1
4 TABLET, ORALLY DISINTEGRATING ORAL 3 TIMES DAILY PRN
Qty: 8 TABLET | Refills: 0 | Status: SHIPPED | OUTPATIENT
Start: 2024-11-18
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
## 2025-01-16 NOTE — CARE COORDINATION
DIAGNOSTIC IMPRESSIONS: Substance-Use Disorder (SUB)    Scale: DSM-V Diagnosis Code   No diagnosis                    0-1    Mild TIMBO                          2-3    Moderate TIMBO                 4-5 F10.10   Severe TIMBO                      6+ F14.20, F12.20, F17.200   Other factors: [facility] Client recommended IOP & aftercare. Client will be referred out for mental health issues related to trauma and reports anxiety & depression. Client will start 7/11/22, however she is scheduled to deliver her baby on Thursday 7/14/2022 and return to [facility] once released from doctor's care. Electronically signed by Sonny Valle [place] on 7/1/0603 at 11:28 AM          Criteria symptoms   A problematic pattern of substance use leading to clinically significant impairment or distress, as manifested by at least two of the following, occurring within a 12-month period. [x] Taken in larger amounts or over longer time than intended. [x] Persistent desire or unsuccessful efforts to cut down/control use. [x] Great deal of time spent obtaining , using, and recovering from effects. [x] Craving or strong desire to use. [x] Recurrent use resulting in failure to fulfill major role obligations at work; school, or home. [x] Continued use despite persistent or recurrent social/interpersonal problems caused or exacerbated by effects. [x] Giving up important social, occupational or recreational activities because of use. [x] Recurrent use in situations in which it is physically hazardous. [x] Continued use despite knowledge of persistent or recurrent physical or psychological problem likely caused/exacerbated by use. [x] Tolerance as defined by either:  · Need for increased amounts to achieve intoxication or desired effects. · Diminished effect with continued use of the same amount.     [x] Withdrawal as manifested by either:  · The characteristic withdrawl symptoms  · Using to relieve or avoid withdrawal symptoms GEN - NAD; well appearing; A+O x3; non-toxic appearing  HEAD: swelling over R eye brow with small ecchymosis, NCAT; EYES: PERRLA, EOMI, no discharge; NOSE: No turbinate swelling, no bleeding noted; NECK: Supple; no lymphadenopathy. THROAT: Oral cavity and pharynx normal. No inflammation, swelling, exudate, or lesions.   CARD -s1s2, RRR, no M,G,R;   PULM - CTA b/l, symmetric breath sounds;   ABD -  +BS, ND, NT, soft, no guarding, no rebound, no masses;   BACK - no CVA tenderness, Normal  spine;   EXT - symmetric pulses, 2+ dp, capillary refill < 2 seconds, no cyanosis, no edema;   L arm tender to palpation over lateral humeral surface, no ecchymosis, minimal swelling.   NEURO - no focal neuro deficits, no slurred speech

## (undated) DEVICE — TUBE BLD COLLECT ST 1 SIL COAT 7ML 10ML

## (undated) DEVICE — SUTURE VCRL SZ 0 L27IN ABSRB VLT L48MM CTX 1/2 CIR TAPR PNT J364H

## (undated) DEVICE — GLOVE ORANGE PI 7   MSG9070

## (undated) DEVICE — SLEEVE COMPRESS STD CALF KNEE SCD

## (undated) DEVICE — SUTURE MCRYL SZ 0 L27IN ABSRB VLT CT-1 L36MM 1/2 CIR TAPR Y340H

## (undated) DEVICE — SCALPEL SURG NO21 S STL STR W/ INTEGR MTRC RUL DISP

## (undated) DEVICE — CESAREAN BIRTH PACK: Brand: MEDLINE INDUSTRIES, INC.

## (undated) DEVICE — SCALPEL SURG NO10 S STL ABS PLAS HNDL DISPOSABLE

## (undated) DEVICE — LINER,SOFT,SUCTION CANISTER,1500CC: Brand: MEDLINE

## (undated) DEVICE — Z DISCONTINUED USE 2275683 GLOVE SURG SZ 6.5 L12IN FNGR THK13MIL WHT ISOLEX

## (undated) DEVICE — MASTISOL ADHESIVE LIQ 2/3ML

## (undated) DEVICE — BLADE CLIPPER GEN PURP NS

## (undated) DEVICE — SUTURE VCRL SZ 0 L36IN ABSRB VLT L36MM CT-1 1/2 CIR J346H

## (undated) DEVICE — 3M™ STERI-STRIP™ ELASTIC SKIN CLOSURES, E4547, 1/2 IN X 4 IN (12 MM X 100 MM), 6 STRIPS/ENVELOPE: Brand: 3M™ STERI-STRIP™

## (undated) DEVICE — DRESSING SUPERABSORBENT W4XL4IN 4 LAYR NONWOVEN FLD

## (undated) DEVICE — ELECTRODE PT RET AD L9FT HI MOIST COND ADH HYDRGEL CORDED

## (undated) DEVICE — SUTURE STRATAFIX SYMMETRIC SZ 1 L18IN ABSRB VLT CT1 L36CM SXPP1A404

## (undated) DEVICE — SUTURE VCRL SZ 1 L27IN ABSRB VLT L36MM CT-1 1/2 CIR J341H

## (undated) DEVICE — Z DISCONTINUED GLOVE SURG SZ 7.5 L12IN FNGR THK13MIL WHT ISOLEX

## (undated) DEVICE — GOWN,SIRUS,FABRNF,XL,20/CS: Brand: MEDLINE

## (undated) DEVICE — SUTURE VCRL SZ 2-0 L27IN ABSRB VLT L36MM CT-1 1/2 CIR J339H

## (undated) DEVICE — SUTURE VCRL SZ 3-0 L27IN ABSRB UD L36MM CT-1 1/2 CIR J258H

## (undated) DEVICE — CHLORAPREP 26ML ORANGE

## (undated) DEVICE — PAD ABD CURITY TENDERSORB 5X9IN

## (undated) DEVICE — SUTURE VCRL SZ 2-0 L27IN ABSRB VLT L26MM SH 1/2 CIR J317H

## (undated) DEVICE — TRAY CATH CATH OD16FR 200ML URIN M SIL CNTR ENTRY F

## (undated) DEVICE — ZINACTIVE SUPPLIER CLOSED SOLUTION SURG SCRB POVIDONE IOD BDINE

## (undated) DEVICE — TELFA ADHESIVE ISLAND DRESSING: Brand: TELFA

## (undated) DEVICE — Z DISCONTINUED GLOVE SURG SZ 7 L12IN FNGR THK13MIL WHT ISOLEX POLYISOPRENE

## (undated) DEVICE — RETRACTOR SURG M-L RNG DIA17CM INCIS 15CM ELAS SELF RET BLNT

## (undated) DEVICE — GLOVE SURG SZ 65 THK91MIL LTX FREE SYN POLYISOPRENE

## (undated) DEVICE — STAPLER SKIN SQ 30 ABSRB STPL DISP INSORB

## (undated) DEVICE — GLOVE ORANGE PI 7 1/2   MSG9075

## (undated) DEVICE — DRESSING SURG 4X14 IN POSTOP SIL BORD MEPILEX

## (undated) DEVICE — Z DISCONTINUED USE 2744636  DRESSING AQUACEL 14 IN ALG W3.5XL14IN POLYUR FLM CVR W/ HYDRCOLL

## (undated) DEVICE — STANDARD HYPODERMIC NEEDLE,POLYPROPYLENE HUB: Brand: MONOJECT